# Patient Record
Sex: FEMALE | Race: WHITE | NOT HISPANIC OR LATINO | Employment: UNEMPLOYED | URBAN - METROPOLITAN AREA
[De-identification: names, ages, dates, MRNs, and addresses within clinical notes are randomized per-mention and may not be internally consistent; named-entity substitution may affect disease eponyms.]

---

## 2024-01-20 ENCOUNTER — HOSPITAL ENCOUNTER (EMERGENCY)
Facility: HOSPITAL | Age: 34
Discharge: HOME | End: 2024-01-20
Attending: EMERGENCY MEDICINE
Payer: COMMERCIAL

## 2024-01-20 ENCOUNTER — APPOINTMENT (EMERGENCY)
Dept: RADIOLOGY | Facility: HOSPITAL | Age: 34
End: 2024-01-20
Payer: COMMERCIAL

## 2024-01-20 VITALS
DIASTOLIC BLOOD PRESSURE: 87 MMHG | SYSTOLIC BLOOD PRESSURE: 114 MMHG | RESPIRATION RATE: 19 BRPM | HEART RATE: 87 BPM | OXYGEN SATURATION: 98 % | TEMPERATURE: 97.7 F

## 2024-01-20 DIAGNOSIS — J40 BRONCHITIS: Primary | ICD-10-CM

## 2024-01-20 LAB
FLUAV RNA RESP QL NAA+PROBE: NEGATIVE
FLUBV RNA RESP QL NAA+PROBE: NEGATIVE
RSV RNA RESP QL NAA+PROBE: NEGATIVE
SARS-COV-2 RNA RESP QL NAA+PROBE: NEGATIVE

## 2024-01-20 PROCEDURE — 71045 X-RAY EXAM CHEST 1 VIEW: CPT

## 2024-01-20 PROCEDURE — 99285 EMERGENCY DEPT VISIT HI MDM: CPT

## 2024-01-20 PROCEDURE — 87798 DETECT AGENT NOS DNA AMP: CPT | Performed by: EMERGENCY MEDICINE

## 2024-01-20 PROCEDURE — 0241U HB NFCT DS VIR RESP RNA 4 TRGT: CPT | Performed by: EMERGENCY MEDICINE

## 2024-01-20 PROCEDURE — 93005 ELECTROCARDIOGRAM TRACING: CPT

## 2024-01-20 PROCEDURE — 99284 EMERGENCY DEPT VISIT MOD MDM: CPT | Performed by: EMERGENCY MEDICINE

## 2024-01-20 RX ORDER — BENZONATATE 100 MG/1
100 CAPSULE ORAL 2 TIMES DAILY PRN
Qty: 21 CAPSULE | Refills: 0 | Status: SHIPPED | OUTPATIENT
Start: 2024-01-20

## 2024-01-20 RX ORDER — AZITHROMYCIN 250 MG/1
TABLET, FILM COATED ORAL
Qty: 6 TABLET | Refills: 0 | Status: SHIPPED | OUTPATIENT
Start: 2024-01-20 | End: 2024-01-24

## 2024-01-20 RX ORDER — AZITHROMYCIN 250 MG/1
500 TABLET, FILM COATED ORAL ONCE
Status: COMPLETED | OUTPATIENT
Start: 2024-01-20 | End: 2024-01-20

## 2024-01-20 RX ADMIN — AZITHROMYCIN DIHYDRATE 500 MG: 250 TABLET ORAL at 22:04

## 2024-01-21 LAB
ATRIAL RATE: 77 BPM
B PARAPERT DNA UPPER RESP QL NAA+PROBE: NOT DETECTED
B PERT DNA UPPER RESP QL NAA+PROBE: NOT DETECTED
P AXIS: 57 DEGREES
PR INTERVAL: 130 MS
QRS AXIS: 58 DEGREES
QRSD INTERVAL: 84 MS
QT INTERVAL: 366 MS
QTC INTERVAL: 414 MS
T WAVE AXIS: 38 DEGREES
VENTRICULAR RATE: 77 BPM

## 2024-01-21 NOTE — ED PROVIDER NOTES
History  Chief Complaint   Patient presents with    Cough     Started mid December after pulling tile off the floor with dust flying; was given antibiotics and started to feel better but then yesterday started with increasing cough feeling it mostly in the chest     Shortness of Breath     All the time; increasing after activity and coughing attacks     HPI      This is a very pleasant, nontoxic-appearing, 33-year-old female with a history of anxiety, depression, lupus, presents emergency department with greater than 1 month history of cough, congestion.  Patient was not tested for any viral illnesses did see her primary care doctor in which the chest x-ray was unremarkable and she was placed on a 7-day course of doxycycline which she finished 2 weeks ago.  Patient is currently in the process of moving from [place] to the [facility] up in [facility] and approximately 1 month ago she worked with her  to rip out the carpeting that underneath had a concrete floor there was a lot of dust and after she swept that up she started to have some shortness of breath and cough.  Did notice that there was mold on the carpeting became concerned as since her symptoms affect going on for extended period time she came to emergency department further evaluation.  None       Past Medical History:   Diagnosis Date    Anxiety     Depression     Hypertension     Insomnia     Lupus (HCC)     Migraines        History reviewed. No pertinent surgical history.    History reviewed. No pertinent family history.  I have reviewed and agree with the history as documented.    E-Cigarette/Vaping    E-Cigarette Use Never User      E-Cigarette/Vaping Substances    Nicotine No     THC No     CBD No     Flavoring No     Other No     Unknown No      Social History     Tobacco Use    Smoking status: Never    Smokeless tobacco: Never   Vaping Use    Vaping status: Never Used   Substance Use Topics    Alcohol use: Never    Drug use: Yes     Types:  Marijuana       Review of Systems   Constitutional: Negative.  Negative for chills and fever.   HENT: Negative.  Negative for ear pain.    Eyes: Negative.    Respiratory:  Positive for cough and chest tightness. Negative for shortness of breath and wheezing.    Cardiovascular: Negative.  Negative for chest pain, palpitations and leg swelling.   Gastrointestinal: Negative.  Negative for abdominal pain.   Endocrine: Negative.    Genitourinary: Negative.    Musculoskeletal: Negative.    Skin: Negative.    Allergic/Immunologic: Negative.    Neurological: Negative.    Hematological: Negative.    Psychiatric/Behavioral: Negative.         Physical Exam  Physical Exam  Vitals and nursing note reviewed.   Constitutional:       General: She is not in acute distress.     Appearance: She is well-developed and normal weight. She is not ill-appearing, toxic-appearing or diaphoretic.   HENT:      Head: Normocephalic and atraumatic.      Mouth/Throat:      Mouth: Mucous membranes are moist.      Pharynx: Oropharynx is clear.   Eyes:      Extraocular Movements: Extraocular movements intact.      Pupils: Pupils are equal, round, and reactive to light.   Cardiovascular:      Rate and Rhythm: Normal rate and regular rhythm.   Pulmonary:      Effort: Pulmonary effort is normal.      Breath sounds: Normal breath sounds. No decreased breath sounds, wheezing, rhonchi or rales.   Musculoskeletal:         General: Normal range of motion.      Cervical back: Normal range of motion and neck supple.      Right lower leg: No tenderness. No edema.      Left lower leg: No tenderness. No edema.   Skin:     General: Skin is warm.      Capillary Refill: Capillary refill takes less than 2 seconds.   Neurological:      General: No focal deficit present.      Mental Status: She is alert and oriented to person, place, and time.   Psychiatric:         Mood and Affect: Mood normal.         Behavior: Behavior normal.         Vital Signs  ED Triage Vitals  [01/20/24 2021]   Temperature Pulse Respirations Blood Pressure SpO2   97.7 °F (36.5 °C) 87 19 114/87 98 %      Temp src Heart Rate Source Patient Position - Orthostatic VS BP Location FiO2 (%)   -- Monitor Sitting Left arm --      Pain Score       --           Vitals:    01/20/24 2021   BP: 114/87   Pulse: 87   Patient Position - Orthostatic VS: Sitting         Visual Acuity      ED Medications  Medications   azithromycin (ZITHROMAX) tablet 500 mg (500 mg Oral Given 1/20/24 2204)       Diagnostic Studies  Results Reviewed       Procedure Component Value Units Date/Time    FLU/RSV/COVID - if FLU/RSV clinically relevant [[id number]]  (Normal) Collected: 01/20/24 2056    Lab Status: Final result Specimen: Nares from Nose Updated: 01/20/24 2139     SARS-CoV-2 Negative     INFLUENZA A PCR Negative     INFLUENZA B PCR Negative     RSV PCR Negative    Narrative:      FOR PEDIATRIC PATIENTS - copy/paste COVID Guidelines URL to browser: [email or web address]    SARS-CoV-2 assay is a Nucleic Acid Amplification assay intended for the  qualitative detection of nucleic acid from SARS-CoV-2 in nasopharyngeal  swabs. Results are for the presumptive identification of SARS-CoV-2 RNA.    Positive results are indicative of infection with SARS-CoV-2, the virus  causing COVID-19, but do not rule out bacterial infection or co-infection  with other viruses. Laboratories within the [place] and its  territories are required to report all positive results to the appropriate  public health authorities. Negative results do not preclude SARS-CoV-2  infection and should not be used as the sole basis for treatment or other  patient management decisions. Negative results must be combined with  clinical observations, patient history, and epidemiological information.  This test has not been FDA cleared or approved.    This test has been authorized by FDA under an Emergency Use  Authorization  (EUA). This test is only authorized for the duration of time the  declaration that circumstances exist justifying the authorization of the  emergency use of an in vitro diagnostic tests for detection of SARS-CoV-2  virus and/or diagnosis of COVID-19 infection under section 564(b)(1) of  the Act, 21 U.S.C. 360bbb-3(b)(1), unless the authorization is terminated  or revoked sooner. The test has been validated but independent review by FDA  and CLIA is pending.    Test performed using [employer]: This RT-PCR assay targets N2,  a region unique to SARS-CoV-2. A conserved region in the E-gene was chosen  for pan-Sarbecovirus detection which includes SARS-CoV-2.    According to CMS-2020-01-R, this platform meets the definition of high-throughput technology.    Bordetella pertussis / parapertussis PCR [[id number]] Collected: 01/20/24 2056    Lab Status: In process Specimen: Nasopharyngeal from Nose Updated: 01/20/24 2100    Narrative:      The following orders were created for panel order Bordetella pertussis / parapertussis PCR.  Procedure                               Abnormality         Status                     ---------                               -----------         ------                     Bordetella pertussis / p...[[id number]]                      In process                   Please view results for these tests on the individual orders.    Bordetella pertussis / parapertussis PCR [[id number]] Collected: 01/20/24 2056    Lab Status: In process Specimen: Nasopharyngeal from Nose Updated: 01/20/24 2100                   XR chest 1 view portable   ED Interpretation by Dontae Genao III, DO (01/20 2104)   Portable chest x-ray was no acute fractures, osseous abnormalities or pneumothoraces or infiltrates.                 Procedures  ECG 12 Lead Documentation Only    Date/Time: 1/20/2024 10:24 PM    Performed by: Dontae Genao III, DO  Authorized by: Dontae Genao III, DO     Indications / Diagnosis:  Chest pain after coughing  ECG reviewed by me, the ED Provider: yes    Patient location:  ED  Comments:      I personally reviewed this EKG that was performed the patient January 20, 2024, EKG was completed at 8:46 PM and interpreted by me at 8:50 PM, normal sinus rhythm with no acute ST abnormalities, patient's ventricular rate is 77 beats minute remaining portion of all is within normal limits.    No diffuse elevations to indicate pericarditis.  No coved ST elevations greater than 2mm with negative T waves in V1-3 to indicate concern for brugada.  No biphasic T waves in V2, V3 to indicate Wellens (critical stenosis of LAD).   No elevation in aVR or deviation when compared to V1 (can be associated with ST depression in I,II, V4-6 when left main occlusion is present).            ED Course  ED Course as of 01/20/24 2227   Sat Jan 20, 2024 2026 Patient seen and evaluated, orders placed,  > 1 month hx of cough, congestion, treated 2 weeks ago w/ 7 day course of doxcyclcine, outpatient x-ray negative, travels between [place] and up in [facility] (near area of recent pertussis outbreak).  Deep cough w/o fever.    Focused differential diagnosis in this patient is as follows: Pneumonia versus COVID versus flu versus RSV versus bronchitis versus pertussis.   2206 She became concerned about antibiotics because she has a remote history of C. difficile diarrhea secondary to clindamycin use last year.  Recently finished up doxycycline did not have any problems with this and she takes a probiotic every day along with yogurt.  Patient at high risk for pertussis and she was spending time in the area where there was a outbreak recently, has had a cough greater than 3 weeks amenable to appropriate antibiotics before, chest x-ray did not show any pneumonia, PCR testing sent, patient to be empirically treated with Zithromax for presumptive bronchitis.  Patient was concerned about the fact that she been  "staying up most of the night coughing, she will be given a prescription for Tessalon Perles, patient's demographics were queried in the drug monitoring program through the state, no active red flag issues.                                             Medical Decision Making  Greater than 1 month history of a chronic cough, no history of intrinsic lung disease, was exposed to mold ripping up carpets at a new house that they are refurbishing locally up in [facility] near [place] which is currently a outbreak of pertussis, pertussis PCR sent, remaining portion of viral panel is negative, chest x-ray unremarkable.  Will empirically treat the patient with Zithromax.  Patient has a prior history of C. difficile with diarrhea last year he recovered, was secondary to taking clindamycin at that time.  Patient noted that she was requesting IV antibiotics because she \"under the impression that IV antibiotics lessens your for this\", explained that all antibiotics placed patient is at risk for C. difficile diarrhea, continue outpatient probiotics, patient does consume yogurt daily, demineralized patient's symptoms.  Patient stable for discharge.  QT interval  nrl on EKG.    Portions of the record may have been created with voice recognition software. Occasional wrong word or \"sound a like\" substitutions may have occurred due to the inherent limitations of voice recognition software. Read the chart carefully and recognize, using context, where substitutions have occurred.          Amount and/or Complexity of Data Reviewed  Labs: ordered.  Radiology: ordered and independent interpretation performed.    Risk  Prescription drug management.             Disposition  Final diagnoses:   Bronchitis     Time reflects when diagnosis was documented in both MDM as applicable and the Disposition within this note       Time User Action Codes Description Comment    1/20/2024  9:53 PM Dontae Genao Add [J40] Bronchitis       "     ED Disposition       ED Disposition   Discharge    Condition   Stable    Date/Time   Sat Jan 20, 2024  9:52 PM    Comment   Jia Armstrong discharge to home/self care.                   Follow-up Information    None         Discharge Medication List as of 1/20/2024 10:06 PM        START taking these medications    Details   azithromycin (Zithromax Z-Fidel) 250 mg tablet Take 2 tablets today then 1 tablet daily x 4 days, Normal      benzonatate (TESSALON PERLES) 100 mg capsule Take 1 capsule (100 mg total) by mouth 2 (two) times a day as needed for cough, Starting Sat 1/20/2024, Normal             No discharge procedures on file.    PDMP Review         Value Time User    PDMP Reviewed  Yes 1/20/2024 10:08 PM Dontae Genao III, DO            ED Provider  Electronically Signed by             Dontae Genao III, DO  01/20/24 9896

## 2024-01-24 ENCOUNTER — APPOINTMENT (OUTPATIENT)
Dept: RADIOLOGY | Facility: CLINIC | Age: 34
End: 2024-01-24
Payer: COMMERCIAL

## 2024-01-24 DIAGNOSIS — R05.9 COUGH, UNSPECIFIED TYPE: ICD-10-CM

## 2024-01-24 PROCEDURE — 71046 X-RAY EXAM CHEST 2 VIEWS: CPT

## 2024-01-25 ENCOUNTER — APPOINTMENT (EMERGENCY)
Dept: CT IMAGING | Facility: HOSPITAL | Age: 34
End: 2024-01-25
Payer: COMMERCIAL

## 2024-01-25 ENCOUNTER — HOSPITAL ENCOUNTER (EMERGENCY)
Facility: HOSPITAL | Age: 34
Discharge: HOME | End: 2024-01-25
Attending: EMERGENCY MEDICINE
Payer: COMMERCIAL

## 2024-01-25 VITALS
TEMPERATURE: 97.6 F | DIASTOLIC BLOOD PRESSURE: 74 MMHG | RESPIRATION RATE: 20 BRPM | HEART RATE: 96 BPM | SYSTOLIC BLOOD PRESSURE: 138 MMHG | OXYGEN SATURATION: 90 %

## 2024-01-25 DIAGNOSIS — R29.0 CARPOPEDAL SPASM: ICD-10-CM

## 2024-01-25 DIAGNOSIS — J20.9 ACUTE BRONCHITIS: Primary | ICD-10-CM

## 2024-01-25 LAB
ALBUMIN SERPL BCP-MCNC: 4.2 G/DL (ref 3.5–5)
ALP SERPL-CCNC: 60 U/L (ref 34–104)
ALT SERPL W P-5'-P-CCNC: 9 U/L (ref 7–52)
ANION GAP SERPL CALCULATED.3IONS-SCNC: 9 MMOL/L
AST SERPL W P-5'-P-CCNC: 14 U/L (ref 13–39)
BASOPHILS # BLD AUTO: 0.07 THOUSANDS/ÂΜL (ref 0–0.1)
BASOPHILS NFR BLD AUTO: 1 % (ref 0–1)
BILIRUB SERPL-MCNC: 0.48 MG/DL (ref 0.2–1)
BUN SERPL-MCNC: 8 MG/DL (ref 5–25)
CALCIUM SERPL-MCNC: 9.7 MG/DL (ref 8.4–10.2)
CARDIAC TROPONIN I PNL SERPL HS: <2 NG/L
CHLORIDE SERPL-SCNC: 108 MMOL/L (ref 96–108)
CO2 SERPL-SCNC: 23 MMOL/L (ref 21–32)
CREAT SERPL-MCNC: 0.89 MG/DL (ref 0.6–1.3)
EOSINOPHIL # BLD AUTO: 0.23 THOUSAND/ÂΜL (ref 0–0.61)
EOSINOPHIL NFR BLD AUTO: 2 % (ref 0–6)
ERYTHROCYTE [DISTWIDTH] IN BLOOD BY AUTOMATED COUNT: 12.1 % (ref 11.6–15.1)
GFR SERPL CREATININE-BSD FRML MDRD: 84 ML/MIN/1.73SQ M
GLUCOSE SERPL-MCNC: 112 MG/DL (ref 65–140)
HCT VFR BLD AUTO: 40.7 % (ref 34.8–46.1)
HGB BLD-MCNC: 14.3 G/DL (ref 11.5–15.4)
IMM GRANULOCYTES # BLD AUTO: 0.03 THOUSAND/UL (ref 0–0.2)
IMM GRANULOCYTES NFR BLD AUTO: 0 % (ref 0–2)
LYMPHOCYTES # BLD AUTO: 2.08 THOUSANDS/ÂΜL (ref 0.6–4.47)
LYMPHOCYTES NFR BLD AUTO: 17 % (ref 14–44)
MCH RBC QN AUTO: 30.6 PG (ref 26.8–34.3)
MCHC RBC AUTO-ENTMCNC: 35.1 G/DL (ref 31.4–37.4)
MCV RBC AUTO: 87 FL (ref 82–98)
MONOCYTES # BLD AUTO: 0.7 THOUSAND/ÂΜL (ref 0.17–1.22)
MONOCYTES NFR BLD AUTO: 6 % (ref 4–12)
NEUTROPHILS # BLD AUTO: 8.94 THOUSANDS/ÂΜL (ref 1.85–7.62)
NEUTS SEG NFR BLD AUTO: 74 % (ref 43–75)
NRBC BLD AUTO-RTO: 0 /100 WBCS
PLATELET # BLD AUTO: 297 THOUSANDS/UL (ref 149–390)
PMV BLD AUTO: 9.7 FL (ref 8.9–12.7)
POTASSIUM SERPL-SCNC: 3.9 MMOL/L (ref 3.5–5.3)
PROT SERPL-MCNC: 6.8 G/DL (ref 6.4–8.4)
RBC # BLD AUTO: 4.68 MILLION/UL (ref 3.81–5.12)
SODIUM SERPL-SCNC: 140 MMOL/L (ref 135–147)
WBC # BLD AUTO: 12.05 THOUSAND/UL (ref 4.31–10.16)

## 2024-01-25 PROCEDURE — 94640 AIRWAY INHALATION TREATMENT: CPT

## 2024-01-25 PROCEDURE — 36415 COLL VENOUS BLD VENIPUNCTURE: CPT | Performed by: PHYSICIAN ASSISTANT

## 2024-01-25 PROCEDURE — 99284 EMERGENCY DEPT VISIT MOD MDM: CPT

## 2024-01-25 PROCEDURE — 99284 EMERGENCY DEPT VISIT MOD MDM: CPT | Performed by: PHYSICIAN ASSISTANT

## 2024-01-25 PROCEDURE — 93005 ELECTROCARDIOGRAM TRACING: CPT

## 2024-01-25 PROCEDURE — 96360 HYDRATION IV INFUSION INIT: CPT

## 2024-01-25 PROCEDURE — 85025 COMPLETE CBC W/AUTO DIFF WBC: CPT | Performed by: PHYSICIAN ASSISTANT

## 2024-01-25 PROCEDURE — 84484 ASSAY OF TROPONIN QUANT: CPT | Performed by: PHYSICIAN ASSISTANT

## 2024-01-25 PROCEDURE — 71250 CT THORAX DX C-: CPT

## 2024-01-25 PROCEDURE — 80053 COMPREHEN METABOLIC PANEL: CPT | Performed by: PHYSICIAN ASSISTANT

## 2024-01-25 RX ORDER — LORAZEPAM 1 MG/1
1 TABLET ORAL ONCE
Status: COMPLETED | OUTPATIENT
Start: 2024-01-25 | End: 2024-01-25

## 2024-01-25 RX ORDER — PREDNISONE 20 MG/1
60 TABLET ORAL ONCE
Status: COMPLETED | OUTPATIENT
Start: 2024-01-25 | End: 2024-01-25

## 2024-01-25 RX ORDER — ALBUTEROL SULFATE 90 UG/1
2 AEROSOL, METERED RESPIRATORY (INHALATION) EVERY 6 HOURS PRN
Qty: 8.5 G | Refills: 0 | Status: SHIPPED | OUTPATIENT
Start: 2024-01-25

## 2024-01-25 RX ORDER — BENZONATATE 100 MG/1
100 CAPSULE ORAL EVERY 8 HOURS
Qty: 9 CAPSULE | Refills: 0 | Status: SHIPPED | OUTPATIENT
Start: 2024-01-25

## 2024-01-25 RX ORDER — PREDNISONE 20 MG/1
40 TABLET ORAL DAILY
Qty: 8 TABLET | Refills: 0 | Status: SHIPPED | OUTPATIENT
Start: 2024-01-25 | End: 2024-01-29

## 2024-01-25 RX ORDER — ALBUTEROL SULFATE 2.5 MG/3ML
5 SOLUTION RESPIRATORY (INHALATION) ONCE
Status: COMPLETED | OUTPATIENT
Start: 2024-01-25 | End: 2024-01-25

## 2024-01-25 RX ORDER — BENZONATATE 100 MG/1
200 CAPSULE ORAL ONCE
Status: COMPLETED | OUTPATIENT
Start: 2024-01-25 | End: 2024-01-25

## 2024-01-25 RX ADMIN — LORAZEPAM 1 MG: 1 TABLET ORAL at 20:46

## 2024-01-25 RX ADMIN — PREDNISONE 60 MG: 20 TABLET ORAL at 20:05

## 2024-01-25 RX ADMIN — SODIUM CHLORIDE 1000 ML: 0.9 INJECTION, SOLUTION INTRAVENOUS at 21:51

## 2024-01-25 RX ADMIN — ALBUTEROL SULFATE 5 MG: 2.5 SOLUTION RESPIRATORY (INHALATION) at 20:05

## 2024-01-25 RX ADMIN — BENZONATATE 200 MG: 100 CAPSULE ORAL at 22:29

## 2024-01-25 RX ADMIN — IPRATROPIUM BROMIDE 0.5 MG: 0.5 SOLUTION RESPIRATORY (INHALATION) at 20:05

## 2024-01-26 LAB
ATRIAL RATE: 77 BPM
P AXIS: -20 DEGREES
PR INTERVAL: 116 MS
QRS AXIS: 28 DEGREES
QRSD INTERVAL: 82 MS
QT INTERVAL: 410 MS
QTC INTERVAL: 463 MS
T WAVE AXIS: 5 DEGREES
VENTRICULAR RATE: 77 BPM

## 2024-01-26 NOTE — ED PROVIDER NOTES
History  Chief Complaint   Patient presents with    Cough     Pt reports cough x 1.5 months. Pt states started on abx, finished course today. Pt states symptoms are worsening. Denies fevers at home.      32 yo with cough for 6 weeks. Seen 5 days ago and diagnosed with bronchitis but also placed on zpack for concern of pertussis given pt described the cough as whooping. She denies hemoptysis. No fever. No sick contacts.       History provided by:  Patient   used: No    Cough  Associated symptoms: shortness of breath and wheezing    Associated symptoms: no chest pain, no chills, no ear pain, no fever, no rash and no sore throat        Prior to Admission Medications   Prescriptions Last Dose Informant Patient Reported? Taking?   azithromycin (Zithromax Z-Fidel) 250 mg tablet   No No   Sig: Take 2 tablets today then 1 tablet daily x 4 days   benzonatate (TESSALON PERLES) 100 mg capsule   No No   Sig: Take 1 capsule (100 mg total) by mouth 2 (two) times a day as needed for cough      Facility-Administered Medications: None       Past Medical History:   Diagnosis Date    Anxiety     Depression     Hypertension     Insomnia     Lupus (HCC)     Migraines        History reviewed. No pertinent surgical history.    History reviewed. No pertinent family history.  I have reviewed and agree with the history as documented.    E-Cigarette/Vaping    E-Cigarette Use Never User      E-Cigarette/Vaping Substances    Nicotine No     THC No     CBD No     Flavoring No     Other No     Unknown No      Social History     Tobacco Use    Smoking status: Never    Smokeless tobacco: Never   Vaping Use    Vaping status: Never Used   Substance Use Topics    Alcohol use: Never    Drug use: Yes     Types: Marijuana       Review of Systems   Constitutional:  Negative for chills and fever.   HENT:  Negative for ear pain and sore throat.    Eyes:  Negative for pain and visual disturbance.   Respiratory:  Positive for cough,  shortness of breath and wheezing.    Cardiovascular:  Negative for chest pain and palpitations.   Gastrointestinal:  Negative for abdominal pain and vomiting.   Genitourinary:  Negative for dysuria and hematuria.   Musculoskeletal:  Negative for arthralgias and back pain.   Skin:  Negative for color change and rash.   Neurological:  Negative for seizures and syncope.   All other systems reviewed and are negative.      Physical Exam  Physical Exam  Vitals and nursing note reviewed.   Constitutional:       General: She is not in acute distress.     Appearance: She is well-developed.   HENT:      Head: Normocephalic and atraumatic.   Eyes:      Conjunctiva/sclera: Conjunctivae normal.   Cardiovascular:      Rate and Rhythm: Normal rate and regular rhythm.      Heart sounds: No murmur heard.  Pulmonary:      Effort: Pulmonary effort is normal. No respiratory distress.      Breath sounds: Wheezing present.   Abdominal:      Palpations: Abdomen is soft.      Tenderness: There is no abdominal tenderness.   Musculoskeletal:         General: No swelling.      Cervical back: Neck supple.   Skin:     General: Skin is warm and dry.      Capillary Refill: Capillary refill takes less than 2 seconds.   Neurological:      Mental Status: She is alert.   Psychiatric:         Mood and Affect: Mood normal.         Vital Signs  ED Triage Vitals [01/25/24 1947]   Temperature Pulse Respirations Blood Pressure SpO2   97.6 °F (36.4 °C) 96 20 138/74 90 %      Temp Source Heart Rate Source Patient Position - Orthostatic VS BP Location FiO2 (%)   Temporal Monitor Sitting Left arm --      Pain Score       --           Vitals:    01/25/24 1947   BP: 138/74   Pulse: 96   Patient Position - Orthostatic VS: Sitting         Visual Acuity  Visual Acuity      Flowsheet Row Most Recent Value   L Pupil Size (mm) 3   R Pupil Size (mm) 3            ED Medications  Medications   albuterol inhalation solution 5 mg (5 mg Nebulization Given 1/25/24 2005)    ipratropium (ATROVENT) 0.02 % inhalation solution 0.5 mg (0.5 mg Nebulization Given 1/25/24 2005)   predniSONE tablet 60 mg (60 mg Oral Given 1/25/24 2005)   LORazepam (ATIVAN) tablet 1 mg (1 mg Oral Given 1/25/24 2046)   sodium chloride 0.9 % bolus 1,000 mL (0 mL Intravenous Stopped 1/25/24 2244)   benzonatate (TESSALON PERLES) capsule 200 mg (200 mg Oral Given 1/25/24 2229)       Diagnostic Studies  Results Reviewed       Procedure Component Value Units Date/Time    HS Troponin 0hr (reflex protocol) [[id number]]  (Normal) Collected: 01/25/24 2207    Lab Status: Final result Specimen: Blood from Arm, Left Updated: 01/25/24 2239     hs TnI 0hr <2 ng/L     Comprehensive metabolic panel [[id number]] Collected: 01/25/24 2150    Lab Status: Final result Specimen: Blood from Arm, Left Updated: 01/25/24 2211     Sodium 140 mmol/L      Potassium 3.9 mmol/L      Chloride 108 mmol/L      CO2 23 mmol/L      ANION GAP 9 mmol/L      BUN 8 mg/dL      Creatinine 0.89 mg/dL      Glucose 112 mg/dL      Calcium 9.7 mg/dL      AST 14 U/L      ALT 9 U/L      Alkaline Phosphatase 60 U/L      Total Protein 6.8 g/dL      Albumin 4.2 g/dL      Total Bilirubin 0.48 mg/dL      eGFR 84 ml/min/1.73sq m     Narrative:      National Kidney Disease Foundation guidelines for Chronic Kidney Disease (CKD):     Stage 1 with normal or high GFR (GFR > 90 mL/min/1.73 square meters)    Stage 2 Mild CKD (GFR = 60-89 mL/min/1.73 square meters)    Stage 3A Moderate CKD (GFR = 45-59 mL/min/1.73 square meters)    Stage 3B Moderate CKD (GFR = 30-44 mL/min/1.73 square meters)    Stage 4 Severe CKD (GFR = 15-29 mL/min/1.73 square meters)    Stage 5 End Stage CKD (GFR <15 mL/min/1.73 square meters)  Note: GFR calculation is accurate only with a steady state creatinine    CBC and differential [[id number]]  (Abnormal) Collected: 01/25/24 2150    Lab Status: Final result Specimen: Blood from Arm, Left Updated: 01/25/24 8473     WBC 12.05 Thousand/uL      RBC 4.68  Million/uL      Hemoglobin 14.3 g/dL      Hematocrit 40.7 %      MCV 87 fL      MCH 30.6 pg      MCHC 35.1 g/dL      RDW 12.1 %      MPV 9.7 fL      Platelets 297 Thousands/uL      nRBC 0 /100 WBCs      Neutrophils Relative 74 %      Immat GRANS % 0 %      Lymphocytes Relative 17 %      Monocytes Relative 6 %      Eosinophils Relative 2 %      Basophils Relative 1 %      Neutrophils Absolute 8.94 Thousands/µL      Immature Grans Absolute 0.03 Thousand/uL      Lymphocytes Absolute 2.08 Thousands/µL      Monocytes Absolute 0.70 Thousand/µL      Eosinophils Absolute 0.23 Thousand/µL      Basophils Absolute 0.07 Thousands/µL                    CT chest without contrast   Final Result by Bret Pretty MD (01/25 2038)      No acute intrathoracic abnormality. No infiltrate or pleural effusion.               Workstation performed: [id number]                    Procedures  Procedures         ED Course  ED Course as of 01/28/24 1443   u Jan 25, 2024 2104  brought me to bedside because pt complaining of pain in hand. In carpopedal spasm upon my eval. She began having anxiety/panic attack. Given ativan.              HEART Risk Score      Flowsheet Row Most Recent Value   Heart Score Risk Calculator    History 0 Filed at: 01/28/2024 1440   ECG 0 Filed at: 01/28/2024 1440   Age 0 Filed at: 01/28/2024 1440   Risk Factors 0 Filed at: 01/28/2024 1440   Troponin 0 Filed at: 01/28/2024 1440   HEART Score 0 Filed at: 01/28/2024 1440                                        Medical Decision Making  DDx including but not limited to:  URI, bronchitis, pneumonia, GERD, aspiration pneumonitis, viral illness, COVID 19. Plan: pt demanding CT. States she previously had pneumonia that was not diagnosed until CT. I explained that Cough for 6 weeks without fever is less likely to be pneumonia and more suspicious for other etiology including bronchitis. Pt continues to demand a CT be done. States she knows her body.     MDM: 34 yo  with cough. Became lightheaded after neb. Cardiac workup was unremarkable. CT negative for evidence of pneumonia. Could be bronchitis. Notes she has been having these symptoms since they moved into a new house. Possibly environmental exposure. Discussed steroid. Albuterol inhaler prn. F/u PCP and ultimately pulm if cough and wheezing not improving. Return parameters provided. Pt understands and agrees with plan.      Amount and/or Complexity of Data Reviewed  Labs: ordered.  Radiology: ordered.    Risk  Prescription drug management.             Disposition  Final diagnoses:   Acute bronchitis   Carpopedal spasm     Time reflects when diagnosis was documented in both MDM as applicable and the Disposition within this note       Time User Action Codes Description Comment    1/25/2024  9:02 PM Cal Gonzalez Add [J20.9] Acute bronchitis     1/25/2024  9:02 PM Cal Gonzalez Add [R29.0] Carpopedal spasm           ED Disposition       ED Disposition   Discharge    Condition   Stable    Date/Time   u Jan 25, 2024 4101    Comment   Jia Armstrong discharge to home/self care.                   Follow-up Information       Follow up With Specialties Details Why Contact Info Additional Information    Critical access hospital Emergency Department Emergency Medicine Go to  If symptoms worsen [address] [facility] [id number] [phone] Critical access hospital Emergency Department, [address] [place], [place], [address]            Discharge Medication List as of 1/25/2024 10:41 PM        START taking these medications    Details   albuterol (ProAir HFA) 90 mcg/act inhaler Inhale 2 puffs every 6 (six) hours as needed for wheezing, Starting u 1/25/2024, Print      predniSONE 20 mg tablet Take 2 tablets (40 mg total) by mouth daily for 4 days, Starting u 1/25/2024, Until Mon 1/29/2024, Print           CONTINUE these medications which have NOT CHANGED    Details   benzonatate  (TESSALON PERLES) 100 mg capsule Take 1 capsule (100 mg total) by mouth 2 (two) times a day as needed for cough, Starting Sat 1/20/2024, Normal           STOP taking these medications       azithromycin (Zithromax Z-Fidel) 250 mg tablet Comments:   Reason for Stopping:                   PDMP Review         Value Time User    PDMP Reviewed  Yes 1/20/2024 10:08 PM Dontae Genao III, DO            ED Provider  Electronically Signed by             Cal Gonzalez PA-C  01/28/24 6778

## 2024-02-01 ENCOUNTER — TELEPHONE (OUTPATIENT)
Age: 34
End: 2024-02-01

## 2024-06-20 ENCOUNTER — TELEPHONE (OUTPATIENT)
Dept: PSYCHIATRY | Facility: CLINIC | Age: 34
End: 2024-06-20

## 2024-06-20 NOTE — TELEPHONE ENCOUNTER
Patient has been added to the Medication Management wait list without a referral.    Insurance: juana  Insurance Type:    Commercial [x]   Medicaid []   [facility] (if applicable)   Medicare []  Location Preference: [place]  Provider Preference: no prov pref  Virtual: Yes [] No [x]  Were outside resources sent: Yes [] No [x]

## 2024-07-17 ENCOUNTER — TELEPHONE (OUTPATIENT)
Age: 34
End: 2024-07-17

## 2024-07-17 NOTE — TELEPHONE ENCOUNTER
Patient called to make appointment she has cough and congestion.  Spouse had it last week.  She has lupus and tends to get things a bit worse.  She would like to get in as soon as possible.  Schedule reviewed and appointment made for New Patient with FAYE Riggs next week.    Please review if anything open this week.  Patient is sick and would really like to see the doctor that she gets established with here.

## 2024-07-19 ENCOUNTER — OFFICE VISIT (OUTPATIENT)
Dept: GYNECOLOGY | Facility: CLINIC | Age: 34
End: 2024-07-19
Payer: COMMERCIAL

## 2024-07-19 VITALS
BODY MASS INDEX: 37.12 KG/M2 | HEIGHT: 66 IN | WEIGHT: 231 LBS | DIASTOLIC BLOOD PRESSURE: 70 MMHG | SYSTOLIC BLOOD PRESSURE: 110 MMHG

## 2024-07-19 DIAGNOSIS — Z01.419 ENCOUNTER FOR WELL WOMAN EXAM: Primary | ICD-10-CM

## 2024-07-19 DIAGNOSIS — Z30.011 INITIATION OF OCP (BCP): ICD-10-CM

## 2024-07-19 DIAGNOSIS — Z12.4 CERVICAL CANCER SCREENING: ICD-10-CM

## 2024-07-19 DIAGNOSIS — Z12.39 ENCOUNTER FOR SCREENING BREAST EXAMINATION: ICD-10-CM

## 2024-07-19 PROCEDURE — 99385 PREV VISIT NEW AGE 18-39: CPT | Performed by: PHYSICIAN ASSISTANT

## 2024-07-23 NOTE — PROGRESS NOTES
Assessment/Plan:      Diagnoses and all orders for this visit:    Encounter for well woman exam    Encounter for screening breast examination    Cervical cancer screening    Initiation of OCP (BCP)  -     Norethin-Eth Estrad-Fe Biphas 1 MG-10 MCG / 10 MCG TABS; Take 1 tablet by mouth in the morning    Other orders  -     Discontinue: Norethin-Eth Estrad-Fe Biphas 1 MG-10 MCG / 10 MCG TABS; Take 1 tablet by mouth (Patient not taking: Reported on 7/19/2024)          Subjective:     Patient ID: Jia Armstrong is a 34 y.o. female.    Pt presents asa new patient for her annual exam today--  She has no complaints except cycles heavy, painful, irregular since stopping OCPs a few months ago  Would like to restart  Bowel and bladder are regular  No breast concerns today    No pap today.    Rx lolo  Daily mvi  Nsaids prn        Review of Systems   Constitutional:  Negative for chills, fever and unexpected weight change.   HENT:  Negative for ear pain and sore throat.    Eyes:  Negative for pain and visual disturbance.   Respiratory:  Negative for cough and shortness of breath.    Cardiovascular:  Negative for chest pain and palpitations.   Gastrointestinal:  Negative for abdominal pain, blood in stool, constipation, diarrhea and vomiting.   Genitourinary: Negative.  Negative for dysuria and hematuria.   Musculoskeletal:  Negative for arthralgias and back pain.   Skin:  Negative for color change and rash.   Neurological:  Negative for seizures and syncope.   All other systems reviewed and are negative.        Objective:     Physical Exam  Vitals and nursing note reviewed.   Constitutional:       Appearance: Normal appearance. She is well-developed.   HENT:      Head: Normocephalic and atraumatic.   Chest:   Breasts:     Right: No inverted nipple, mass, nipple discharge or skin change.      Left: No inverted nipple, mass, nipple discharge or skin change.   Abdominal:      Palpations: Abdomen is soft.   Genitourinary:     General:  Normal vulva.      Exam position: Supine.      Labia:         Right: No rash, tenderness or lesion.         Left: No rash, tenderness or lesion.       Vagina: Normal.      Cervix: No cervical motion tenderness, discharge or friability.      Adnexa:         Right: No mass, tenderness or fullness.          Left: No mass, tenderness or fullness.     Musculoskeletal:      Cervical back: Normal range of motion.   Lymphadenopathy:      Lower Body: No right inguinal adenopathy. No left inguinal adenopathy.   Neurological:      Mental Status: She is alert.

## 2024-07-24 ENCOUNTER — OFFICE VISIT (OUTPATIENT)
Dept: FAMILY MEDICINE CLINIC | Facility: CLINIC | Age: 34
End: 2024-07-24
Payer: COMMERCIAL

## 2024-07-24 VITALS
OXYGEN SATURATION: 99 % | DIASTOLIC BLOOD PRESSURE: 74 MMHG | TEMPERATURE: 98.2 F | SYSTOLIC BLOOD PRESSURE: 108 MMHG | HEIGHT: 66 IN | BODY MASS INDEX: 36.64 KG/M2 | WEIGHT: 228 LBS | HEART RATE: 76 BPM

## 2024-07-24 DIAGNOSIS — E61.1 IRON DEFICIENCY: ICD-10-CM

## 2024-07-24 DIAGNOSIS — G47.00 INSOMNIA, UNSPECIFIED TYPE: ICD-10-CM

## 2024-07-24 DIAGNOSIS — E54 VITAMIN C DEFICIENCY: ICD-10-CM

## 2024-07-24 DIAGNOSIS — Z13.220 LIPID SCREENING: ICD-10-CM

## 2024-07-24 DIAGNOSIS — Z00.00 HEALTH MAINTENANCE EXAMINATION: ICD-10-CM

## 2024-07-24 DIAGNOSIS — T45.2X5A: ICD-10-CM

## 2024-07-24 DIAGNOSIS — L70.9 ACNE, UNSPECIFIED ACNE TYPE: ICD-10-CM

## 2024-07-24 DIAGNOSIS — M32.9 LUPUS (HCC): ICD-10-CM

## 2024-07-24 DIAGNOSIS — K21.9 GASTROESOPHAGEAL REFLUX DISEASE WITHOUT ESOPHAGITIS: ICD-10-CM

## 2024-07-24 DIAGNOSIS — I10 PRIMARY HYPERTENSION: ICD-10-CM

## 2024-07-24 DIAGNOSIS — E55.9 VITAMIN D DEFICIENCY: ICD-10-CM

## 2024-07-24 DIAGNOSIS — G43.809 OTHER MIGRAINE WITHOUT STATUS MIGRAINOSUS, NOT INTRACTABLE: Primary | ICD-10-CM

## 2024-07-24 DIAGNOSIS — F41.9 ANXIETY: ICD-10-CM

## 2024-07-24 DIAGNOSIS — F32.A DEPRESSION, UNSPECIFIED DEPRESSION TYPE: ICD-10-CM

## 2024-07-24 DIAGNOSIS — E53.8 VITAMIN B12 DEFICIENCY: ICD-10-CM

## 2024-07-24 DIAGNOSIS — Z13.29 SCREENING FOR THYROID DISORDER: ICD-10-CM

## 2024-07-24 PROCEDURE — 99395 PREV VISIT EST AGE 18-39: CPT

## 2024-07-24 PROCEDURE — 99204 OFFICE O/P NEW MOD 45 MIN: CPT

## 2024-07-24 RX ORDER — IBUPROFEN 400 MG/1
TABLET ORAL EVERY 6 HOURS PRN
COMMUNITY
End: 2024-07-24

## 2024-07-24 RX ORDER — BELIMUMAB 200 MG/ML
200 SOLUTION SUBCUTANEOUS
COMMUNITY
Start: 2024-07-18

## 2024-07-24 RX ORDER — CHOLECALCIFEROL (VITAMIN D3) 50 MCG
2000 TABLET ORAL DAILY
COMMUNITY

## 2024-07-24 RX ORDER — HYDROXYCHLOROQUINE SULFATE 200 MG/1
200 TABLET, FILM COATED ORAL 2 TIMES DAILY WITH MEALS
COMMUNITY

## 2024-07-24 RX ORDER — CLONAZEPAM 0.5 MG/1
0.5 TABLET ORAL 3 TIMES DAILY
COMMUNITY

## 2024-07-24 RX ORDER — LISINOPRIL 5 MG/1
5 TABLET ORAL DAILY
Qty: 30 TABLET | Refills: 0 | Status: SHIPPED | OUTPATIENT
Start: 2024-07-24 | End: 2024-07-24

## 2024-07-24 RX ORDER — LISINOPRIL 5 MG/1
5 TABLET ORAL DAILY
COMMUNITY
End: 2024-07-24 | Stop reason: SDUPTHER

## 2024-07-24 RX ORDER — SACCHAROMYCES BOULARDII 250 MG
250 CAPSULE ORAL 2 TIMES DAILY
COMMUNITY

## 2024-07-24 RX ORDER — ATOGEPANT 60 MG/1
60 TABLET ORAL DAILY
COMMUNITY
End: 2024-07-24 | Stop reason: SDUPTHER

## 2024-07-24 RX ORDER — GABAPENTIN 300 MG/1
300 CAPSULE ORAL 2 TIMES DAILY
COMMUNITY
Start: 2024-05-21

## 2024-07-24 RX ORDER — ADHESIVE TAPE 3"X 2.3 YD
TAPE, NON-MEDICATED TOPICAL
COMMUNITY

## 2024-07-24 RX ORDER — ESOMEPRAZOLE MAGNESIUM 40 MG/1
40 CAPSULE, DELAYED RELEASE ORAL
Qty: 60 CAPSULE | Refills: 0 | Status: SHIPPED | OUTPATIENT
Start: 2024-07-24 | End: 2024-07-24

## 2024-07-24 RX ORDER — BUPROPION HYDROCHLORIDE 300 MG/1
300 TABLET ORAL DAILY
COMMUNITY

## 2024-07-24 RX ORDER — ATOGEPANT 60 MG/1
60 TABLET ORAL DAILY
Qty: 30 TABLET | Refills: 0 | Status: SHIPPED | OUTPATIENT
Start: 2024-07-24 | End: 2024-07-24

## 2024-07-24 RX ORDER — BUSPIRONE HYDROCHLORIDE 10 MG/1
20 TABLET ORAL 2 TIMES DAILY
COMMUNITY

## 2024-07-24 RX ORDER — LISINOPRIL 5 MG/1
5 TABLET ORAL DAILY
Qty: 90 TABLET | Refills: 0 | Status: SHIPPED | OUTPATIENT
Start: 2024-07-24

## 2024-07-24 RX ORDER — ESOMEPRAZOLE MAGNESIUM 40 MG/1
40 CAPSULE, DELAYED RELEASE ORAL
Qty: 180 CAPSULE | Refills: 0 | Status: SHIPPED | OUTPATIENT
Start: 2024-07-24 | End: 2024-10-22

## 2024-07-24 RX ORDER — LANOLIN ALCOHOL/MO/W.PET/CERES
CREAM (GRAM) TOPICAL DAILY
COMMUNITY

## 2024-07-24 RX ORDER — ESCITALOPRAM OXALATE 20 MG/1
20 TABLET ORAL DAILY
COMMUNITY

## 2024-07-24 RX ORDER — ATOGEPANT 60 MG/1
60 TABLET ORAL DAILY
Qty: 90 TABLET | Refills: 0 | Status: SHIPPED | OUTPATIENT
Start: 2024-07-24 | End: 2024-10-22

## 2024-07-24 RX ORDER — MULTIVIT WITH MINERALS/LUTEIN
1000 TABLET ORAL DAILY
COMMUNITY

## 2024-07-24 RX ORDER — ESOMEPRAZOLE MAGNESIUM 40 MG/1
40 CAPSULE, DELAYED RELEASE ORAL
COMMUNITY
End: 2024-07-24 | Stop reason: SDUPTHER

## 2024-07-24 RX ORDER — UBROGEPANT 100 MG/1
100 TABLET ORAL ONCE AS NEEDED
Qty: 9 TABLET | Refills: 0 | Status: SHIPPED | OUTPATIENT
Start: 2024-07-24

## 2024-07-24 RX ORDER — ALPRAZOLAM 0.5 MG/1
0.25 TABLET ORAL AS NEEDED
COMMUNITY

## 2024-07-24 RX ORDER — CLINDAMYCIN AND BENZOYL PEROXIDE 10; 50 MG/G; MG/G
GEL TOPICAL 2 TIMES DAILY
Qty: 25 G | Refills: 0 | Status: SHIPPED | OUTPATIENT
Start: 2024-07-24

## 2024-07-24 RX ORDER — UBROGEPANT 100 MG/1
100 TABLET ORAL
COMMUNITY
Start: 2024-06-14 | End: 2024-07-24 | Stop reason: SDUPTHER

## 2024-07-24 RX ORDER — ESCITALOPRAM OXALATE 5 MG/1
5 TABLET ORAL DAILY
COMMUNITY
Start: 2024-06-06

## 2024-07-24 NOTE — ASSESSMENT & PLAN NOTE
- well controlled on current Qulipta 60 mg QD and Ubrelvy 100 mg PRN; reports she has been on these medication for years and they control her migraines well; previously managed by her prior PCP  - refilled these medications for pt today  - did recommend seeing neurologist for further management of these medications; pt not interested now but we will re-discuss at her follow up apt

## 2024-07-24 NOTE — PROGRESS NOTES
Adult Annual Physical  Name: Jia Armstrong      : 1990      MRN: [id number]  Encounter Provider: Sydney Riggs PA-C  Encounter Date: 2024   Encounter department: [facility]    Assessment & Plan   1. Other migraine without status migrainosus, not intractable  Assessment & Plan:  - well controlled on current Qulipta 60 mg QD and Ubrelvy 100 mg PRN; reports she has been on these medication for years and they control her migraines well; previously managed by her prior PCP  - refilled these medications for pt today  - did recommend seeing neurologist for further management of these medications; pt not interested now but we will re-discuss at her follow up apt   Orders:  -     Ubrelvy 100 MG tablet; Take 1 tablet (100 mg total) by mouth once as needed (migraines) for up to 1 dose  -     Atogepant (Qulipta) 60 MG TABS; Take 60 mg by mouth daily  2. Anxiety  Assessment & Plan:  - follows with psychiatry for management, Dr. Pearce in [place]   - currently on lexapro 25 mg QD, wellbutrin 450 mg QD, buspar 20 mg BID, clonazepam 0.5 mg TID, and xanax 0.5 mg PRN  -  discussed that xanax and clonazepam are in the same class of medications and that I would not recommend taking both; pt reports her psychiatrist prescribes it this way so she is going to continue taking them as prescribed  - continue seeing psychiatry for management of psychiatric medications   3. Insomnia, unspecified type  Assessment & Plan:  - takes melatonin and sleeps well with it   4. Gastroesophageal reflux disease without esophagitis  Assessment & Plan:  - well controlled on nexium 40 mg BID   - refill provided today  - declines GI referral   Orders:  -     esomeprazole (NexIUM) 40 MG capsule; Take 1 capsule (40 mg total) by mouth 2 (two) times a day before meals  5. Acne, unspecified acne type  Assessment & Plan:  - chronic acne of chest  - will trial benzaclin gel BID  - referral to dermatology placed for further  evaluation   - follow up in one month   Orders:  -     clindamycin-benzoyl peroxide (BENZACLIN) gel; Apply topically 2 (two) times a day  -     Ambulatory Referral to Dermatology; Future  6. Depression, unspecified depression type  Assessment & Plan:  - follows with psychiatry for management, Dr. Pearce in [place]   - currently on lexapro 25 mg QD, wellbutrin 450 mg QD, buspar 20 mg BID, clonazepam 0.5 mg TID, and xanax 0.5 mg PRN  -  discussed that xanax and clonazepam are in the same class of medications and that I would not recommend taking both; pt reports her psychiatrist prescribes it this way so she is going to continue taking them as prescribed  - continue seeing psychiatry for management of psychiatric medications   7. Lupus (HCC)  Assessment & Plan:  - follows with Rheumatology   - well controlled on current plaquenil 200 mg QD, gabapentin 300 mg BID, and benlysta  8. Primary hypertension  Assessment & Plan:  - BP WNL today 108/74 mmHg  - continue current lisinopril 5 mg QD; refill provided today   - update CBC and CMP   Orders:  -     lisinopril (ZESTRIL) 5 mg tablet; Take 1 tablet (5 mg total) by mouth daily  9. Health maintenance examination  Assessment & Plan:  - presents to establish care and for routine physical   - physical exam unremarkable   - ordered routine labs; pt also requests vitamin levels to be ordered as she has a hx of def of vitamin D/B12 which she takes daily and has had issues with her vitamin A in the past as well, did make aware that she should call her insurance to make sure they cover these first   - follows with OBGYN; last visit 7/19 (last week); reports she is UTD on pap and will get records sent over   Orders:  -     CBC and differential; Future  -     Comprehensive metabolic panel; Future  10. Vitamin C deficiency  -     Vitamin C; Future  11. Vitamin D deficiency  -     Vitamin D 25 hydroxy; Future  12. Vitamin B12 deficiency  -     Vitamin B12; Future  13. Iron  deficiency  -     Iron Panel (Includes Ferritin, Iron Sat%, Iron, and TIBC); Future  14. Adverse effect of vitamin A  -     Vitamin A; Future  15. Lipid screening  -     Lipid panel; Future  16. Screening for thyroid disorder  -     TSH, 3rd generation with Free T4 reflex; Future    Immunizations and preventive care screenings were discussed with patient today. Appropriate education was printed on patient's after visit summary.    Follow up in one month or sooner as needed     Counseling:  Dental Health: discussed importance of regular tooth brushing, flossing, and dental visits.  Exercise: the importance of regular exercise/physical activity was discussed. Recommend exercise 3-5 times per week for at least 30 minutes.          History of Present Illness       Patient presents to establish care today. She recently moved to the area.   Patient has anxiety and depression - she has a psychiatrist in [place], Dr. Pearce who she last saw in person on 6/29 and is going to continue following with her routinely. She is currently on lexapro 25 mg QD, wellbutrin 450 mg QD, buspar 20 mg BID, clonazepam 0.5 mg TID, and xanax 0.5 mg PRN. Reports her psychiatrist prescribes these medications. Did make patient aware that xanax and clonazepam are in the same class of medications and that I would not recommend taking both; pt reports her psychiatrist prescribes it this way so she is going to continue taking them as prescribed. Aware.     Patient also has lupus which she sees a rheumatologist in [place] (going to continue seeing them via telehealth) for. She is currently on plaquenil 200 mg QD, gabapentin 300 mg BID, and benlysta which she reports well controls her symptoms. These are going to continue being managed and prescribed by her rheumatologist.     Patient has GERD; takes Nexium 40 mg BID. Reports she has been on this medication for years and her previous PCP would prescribe it for her so will need me to take over  management. Feels her symptoms are well controlled.     Patient has migraines which she is on Qulipta for daily and Ubrelvy PRN; reports these medications were prescribed by her previous PCP and she will need me to take over management. Not interested in seeing a neurologist at this time.     Patient has HTN, controlled on lisinopril 5 mg QD. I will take over management of medication and treatment of this condition.     She also reports acne on her chest; ongoing for years. Would like to get rid of it.     Adult Annual Physical:  Patient presents for annual physical.     Diet and Physical Activity:  - Diet/Nutrition: well balanced diet.  - Exercise: walking.    Depression Screening:  - PHQ-2 Score: 0    General Health:  - Sleep: sleeps well.  - Hearing: normal hearing bilateral ears.  - Vision: no vision problems.  - Dental: brushes teeth twice daily.    /GYN Health:  - Follows with GYN: yes.   - Menopause: premenopausal.   - Contraception: oral contraceptives.      Review of Systems   Constitutional:  Negative for chills, diaphoresis and fever.   Respiratory:  Negative for cough, chest tightness, shortness of breath and wheezing.    Cardiovascular:  Negative for chest pain and palpitations.   Gastrointestinal:  Negative for abdominal pain, constipation, diarrhea, nausea and vomiting.   Genitourinary:  Negative for difficulty urinating.   Neurological:  Positive for headaches (migraines). Negative for dizziness and light-headedness.   Psychiatric/Behavioral:  Negative for self-injury, sleep disturbance and suicidal ideas.      Medical History Reviewed by provider this encounter:  Tobacco  Allergies  Meds  Problems  Med Hx  Surg Hx  Fam Hx       Past Medical History   Past Medical History:   Diagnosis Date    Anxiety     Depression     Hypertension     Insomnia     Lupus (HCC)     Migraines      Past Surgical History:   Procedure Laterality Date    REDUCTION MAMMAPLASTY  2015     Family History   Problem  Relation Age of Onset    Breast cancer Mother     Colon cancer Father     Colon cancer Paternal Uncle      Current Outpatient Medications on File Prior to Visit   Medication Sig Dispense Refill    albuterol (ProAir HFA) 90 mcg/act inhaler Inhale 2 puffs every 6 (six) hours as needed for wheezing 8.5 g 0    Ascorbic Acid (vitamin C) 1000 MG tablet Take 1,000 mg by mouth daily      Benlysta 200 MG/ML SOAJ 200 mg Once a week      buPROPion (WELLBUTRIN XL) 300 mg 24 hr tablet Take 300 mg by mouth daily Tahe 450 mg daily      busPIRone (BUSPAR) 10 mg tablet Take 20 mg by mouth 2 (two) times a day      Cholecalciferol (Vitamin D) 50 MCG (2000 UT) tablet Take 2,000 Units by mouth daily      Diclofenac Sodium (VOLTAREN) 1 % Apply 2 g topically 4 (four) times a day      escitalopram (LEXAPRO) 5 mg tablet Take 5 mg by mouth daily      gabapentin (NEURONTIN) 300 mg capsule Take 300 mg by mouth 2 (two) times a day Take 2 caps BID      Melatonin 3 MG/4ML LIQD Take by mouth      Norethin-Eth Estrad-Fe Biphas 1 MG-10 MCG / 10 MCG TABS Take 1 tablet by mouth in the morning 90 tablet 4    saccharomyces boulardii (FLORASTOR) 250 mg capsule Take 250 mg by mouth 2 (two) times a day      vitamin B-12 (VITAMIN B-12) 1,000 mcg tablet Take by mouth daily      [DISCONTINUED] Atogepant (Qulipta) 60 MG TABS Take 60 mg by mouth daily      [DISCONTINUED] esomeprazole (NexIUM) 40 MG capsule Take 40 mg by mouth 2 (two) times a day before meals      [DISCONTINUED] ibuprofen (MOTRIN) 400 mg tablet Take by mouth every 6 (six) hours as needed for mild pain      [DISCONTINUED] lisinopril (ZESTRIL) 5 mg tablet Take 5 mg by mouth daily      [DISCONTINUED] Ubrelvy 100 MG tablet Take 100 mg by mouth      ALPRAZolam (XANAX) 0.5 mg tablet Take 0.25 mg by mouth if needed      clonazePAM (KlonoPIN) 0.5 mg tablet Take 0.5 mg by mouth 3 (three) times a day      escitalopram (LEXAPRO) 20 mg tablet Take 20 mg by mouth daily      hydroxychloroquine (PLAQUENIL) 200  mg tablet Take 200 mg by mouth 2 (two) times a day with meals      [DISCONTINUED] benzonatate (TESSALON PERLES) 100 mg capsule Take 1 capsule (100 mg total) by mouth 2 (two) times a day as needed for cough (Patient not taking: Reported on 7/24/2024) 21 capsule 0    [DISCONTINUED] benzonatate (TESSALON PERLES) 100 mg capsule Take 1 capsule (100 mg total) by mouth every 8 (eight) hours (Patient not taking: Reported on 7/24/2024) 9 capsule 0     No current facility-administered medications on file prior to visit.     Allergies   Allergen Reactions    Bee Venom Anaphylaxis and Hives    Amoxicillin Hives    Topamax [Topiramate] Delirium    Onion - Food Allergy Headache     Per RN, raw onions are a headache trigger    Per RN, raw onions are a headache trigger   Other reaction(s): Headache   Per RN, raw onions are no longer a headache trigger      Current Outpatient Medications on File Prior to Visit   Medication Sig Dispense Refill    albuterol (ProAir HFA) 90 mcg/act inhaler Inhale 2 puffs every 6 (six) hours as needed for wheezing 8.5 g 0    Ascorbic Acid (vitamin C) 1000 MG tablet Take 1,000 mg by mouth daily      Benlysta 200 MG/ML SOAJ 200 mg Once a week      buPROPion (WELLBUTRIN XL) 300 mg 24 hr tablet Take 300 mg by mouth daily Tahe 450 mg daily      busPIRone (BUSPAR) 10 mg tablet Take 20 mg by mouth 2 (two) times a day      Cholecalciferol (Vitamin D) 50 MCG (2000 UT) tablet Take 2,000 Units by mouth daily      Diclofenac Sodium (VOLTAREN) 1 % Apply 2 g topically 4 (four) times a day      escitalopram (LEXAPRO) 5 mg tablet Take 5 mg by mouth daily      gabapentin (NEURONTIN) 300 mg capsule Take 300 mg by mouth 2 (two) times a day Take 2 caps BID      Melatonin 3 MG/4ML LIQD Take by mouth      Norethin-Eth Estrad-Fe Biphas 1 MG-10 MCG / 10 MCG TABS Take 1 tablet by mouth in the morning 90 tablet 4    saccharomyces boulardii (FLORASTOR) 250 mg capsule Take 250 mg by mouth 2 (two) times a day      vitamin B-12  "(VITAMIN B-12) 1,000 mcg tablet Take by mouth daily      [DISCONTINUED] Atogepant (Qulipta) 60 MG TABS Take 60 mg by mouth daily      [DISCONTINUED] esomeprazole (NexIUM) 40 MG capsule Take 40 mg by mouth 2 (two) times a day before meals      [DISCONTINUED] ibuprofen (MOTRIN) 400 mg tablet Take by mouth every 6 (six) hours as needed for mild pain      [DISCONTINUED] lisinopril (ZESTRIL) 5 mg tablet Take 5 mg by mouth daily      [DISCONTINUED] Ubrelvy 100 MG tablet Take 100 mg by mouth      ALPRAZolam (XANAX) 0.5 mg tablet Take 0.25 mg by mouth if needed      clonazePAM (KlonoPIN) 0.5 mg tablet Take 0.5 mg by mouth 3 (three) times a day      escitalopram (LEXAPRO) 20 mg tablet Take 20 mg by mouth daily      hydroxychloroquine (PLAQUENIL) 200 mg tablet Take 200 mg by mouth 2 (two) times a day with meals      [DISCONTINUED] benzonatate (TESSALON PERLES) 100 mg capsule Take 1 capsule (100 mg total) by mouth 2 (two) times a day as needed for cough (Patient not taking: Reported on 7/24/2024) 21 capsule 0    [DISCONTINUED] benzonatate (TESSALON PERLES) 100 mg capsule Take 1 capsule (100 mg total) by mouth every 8 (eight) hours (Patient not taking: Reported on 7/24/2024) 9 capsule 0     No current facility-administered medications on file prior to visit.      Social History     Tobacco Use    Smoking status: Never    Smokeless tobacco: Never   Vaping Use    Vaping status: Never Used   Substance and Sexual Activity    Alcohol use: Not Currently    Drug use: Yes     Types: Marijuana    Sexual activity: Yes       Objective     /74 (BP Location: Left arm, Patient Position: Sitting, Cuff Size: Large)   Pulse 76   Temp 98.2 °F (36.8 °C)   Ht 5' 6\" (1.676 m)   Wt 103 kg (228 lb)   LMP 06/25/2024 (Exact Date)   SpO2 99%   BMI 36.80 kg/m²     Physical Exam  Vitals reviewed.   Constitutional:       General: She is not in acute distress.     Appearance: Normal appearance. She is not ill-appearing or diaphoretic.   HENT: "      Head: Normocephalic and atraumatic.      Right Ear: Tympanic membrane, ear canal and external ear normal. There is no impacted cerumen.      Left Ear: Tympanic membrane, ear canal and external ear normal. There is no impacted cerumen.      Nose: Nose normal.      Mouth/Throat:      Mouth: Mucous membranes are moist.      Pharynx: Oropharynx is clear. No oropharyngeal exudate or posterior oropharyngeal erythema.   Eyes:      General:         Right eye: No discharge.         Left eye: No discharge.      Conjunctiva/sclera: Conjunctivae normal.   Cardiovascular:      Rate and Rhythm: Normal rate and regular rhythm.      Pulses: Normal pulses.      Heart sounds: Normal heart sounds. No murmur heard.  Pulmonary:      Effort: Pulmonary effort is normal. No respiratory distress.      Breath sounds: Normal breath sounds. No wheezing, rhonchi or rales.   Abdominal:      General: Bowel sounds are normal. There is no distension.      Palpations: Abdomen is soft. There is no mass.      Tenderness: There is no abdominal tenderness. There is no guarding or rebound.      Hernia: No hernia is present.   Musculoskeletal:         General: Normal range of motion.      Cervical back: Normal range of motion and neck supple.      Right lower leg: No edema.      Left lower leg: No edema.   Lymphadenopathy:      Cervical: No cervical adenopathy.   Skin:     General: Skin is warm.   Neurological:      General: No focal deficit present.      Mental Status: She is alert.      Gait: Gait normal.   Psychiatric:         Mood and Affect: Mood normal.

## 2024-07-24 NOTE — ASSESSMENT & PLAN NOTE
- follows with psychiatry for management, Dr. Pearce in [place]   - currently on lexapro 25 mg QD, wellbutrin 450 mg QD, buspar 20 mg BID, clonazepam 0.5 mg TID, and xanax 0.5 mg PRN  -  discussed that xanax and clonazepam are in the same class of medications and that I would not recommend taking both; pt reports her psychiatrist prescribes it this way so she is going to continue taking them as prescribed  - continue seeing psychiatry for management of psychiatric medications

## 2024-07-24 NOTE — ASSESSMENT & PLAN NOTE
- BP WNL today 108/74 mmHg  - continue current lisinopril 5 mg QD; refill provided today   - update CBC and CMP

## 2024-07-24 NOTE — ASSESSMENT & PLAN NOTE
- chronic acne of chest  - will trial benzaclin gel BID  - referral to dermatology placed for further evaluation   - follow up in one month

## 2024-07-24 NOTE — ASSESSMENT & PLAN NOTE
- follows with Rheumatology   - well controlled on current plaquenil 200 mg QD, gabapentin 300 mg BID, and benlysta

## 2024-07-24 NOTE — ASSESSMENT & PLAN NOTE
- follows with psychiatry for management, Dr. Pearce in [place]   - currently on lexapro 25 mg QD, wellbutrin 450 mg QD, buspar 20 mg BID, clonazepam 0.5 mg TID, and xanax 0.5 mg PRN  -  discussed that xanax and clonazepam are in the same class of medications and that I would not recommend taking both; pt reports her psychiatrist prescribes it this way so she is going to continue taking them as prescribed  - continue seeing psychiatry for management of psychiatric medications

## 2024-07-24 NOTE — ASSESSMENT & PLAN NOTE
- presents to establish care and for routine physical   - physical exam unremarkable   - ordered routine labs; pt also requests vitamin levels to be ordered as she has a hx of def of vitamin D/B12 which she takes daily and has had issues with her vitamin A in the past as well, did make aware that she should call her insurance to make sure they cover these first   - follows with OBGYN; last visit 7/19 (last week); reports she is UTD on pap and will get records sent over

## 2024-08-21 DIAGNOSIS — R73.9 HYPERGLYCEMIA: ICD-10-CM

## 2024-08-21 DIAGNOSIS — R79.89 ABNORMAL CBC: Primary | ICD-10-CM

## 2024-08-23 PROBLEM — Z00.00 HEALTH MAINTENANCE EXAMINATION: Status: RESOLVED | Noted: 2024-07-24 | Resolved: 2024-08-23

## 2024-08-29 LAB
25(OH)D3+25(OH)D2 SERPL-MCNC: 43.5 NG/ML (ref 30–100)
ALBUMIN SERPL-MCNC: 4.3 G/DL (ref 3.9–4.9)
ALP SERPL-CCNC: 77 IU/L (ref 44–121)
ALT SERPL-CCNC: 10 IU/L (ref 0–32)
AST SERPL-CCNC: 14 IU/L (ref 0–40)
BASOPHILS # BLD AUTO: 0.1 X10E3/UL (ref 0–0.2)
BASOPHILS NFR BLD AUTO: 1 %
BILIRUB SERPL-MCNC: 0.7 MG/DL (ref 0–1.2)
BUN SERPL-MCNC: 9 MG/DL (ref 6–20)
BUN/CREAT SERPL: 9 (ref 9–23)
CALCIUM SERPL-MCNC: 9.6 MG/DL (ref 8.7–10.2)
CHLORIDE SERPL-SCNC: 103 MMOL/L (ref 96–106)
CHOLEST SERPL-MCNC: 187 MG/DL (ref 100–199)
CO2 SERPL-SCNC: 21 MMOL/L (ref 20–29)
CREAT SERPL-MCNC: 0.96 MG/DL (ref 0.57–1)
EGFR: 79 ML/MIN/1.73
EOSINOPHIL # BLD AUTO: 0.1 X10E3/UL (ref 0–0.4)
EOSINOPHIL NFR BLD AUTO: 1 %
ERYTHROCYTE [DISTWIDTH] IN BLOOD BY AUTOMATED COUNT: 12.2 % (ref 11.7–15.4)
GLOBULIN SER-MCNC: 2.4 G/DL (ref 1.5–4.5)
GLUCOSE SERPL-MCNC: 108 MG/DL (ref 70–99)
HCT VFR BLD AUTO: 44.5 % (ref 34–46.6)
HDLC SERPL-MCNC: 51 MG/DL
HGB BLD-MCNC: 14.7 G/DL (ref 11.1–15.9)
IMM GRANULOCYTES # BLD: 0 X10E3/UL (ref 0–0.1)
IMM GRANULOCYTES NFR BLD: 0 %
LDLC SERPL CALC-MCNC: 109 MG/DL (ref 0–99)
LYMPHOCYTES # BLD AUTO: 1.1 X10E3/UL (ref 0.7–3.1)
LYMPHOCYTES NFR BLD AUTO: 9 %
MCH RBC QN AUTO: 29.9 PG (ref 26.6–33)
MCHC RBC AUTO-ENTMCNC: 33 G/DL (ref 31.5–35.7)
MCV RBC AUTO: 91 FL (ref 79–97)
MONOCYTES # BLD AUTO: 0.5 X10E3/UL (ref 0.1–0.9)
MONOCYTES NFR BLD AUTO: 4 %
NEUTROPHILS # BLD AUTO: 10.2 X10E3/UL (ref 1.4–7)
NEUTROPHILS NFR BLD AUTO: 85 %
PLATELET # BLD AUTO: 302 X10E3/UL (ref 150–450)
POTASSIUM SERPL-SCNC: 4.6 MMOL/L (ref 3.5–5.2)
PROT SERPL-MCNC: 6.7 G/DL (ref 6–8.5)
RBC # BLD AUTO: 4.91 X10E6/UL (ref 3.77–5.28)
SL AMB VLDL CHOLESTEROL CALC: 27 MG/DL (ref 5–40)
SODIUM SERPL-SCNC: 140 MMOL/L (ref 134–144)
TRIGL SERPL-MCNC: 155 MG/DL (ref 0–149)
TSH SERPL DL<=0.005 MIU/L-ACNC: 0.94 UIU/ML (ref 0.45–4.5)
VIT A SERPL-MCNC: 72.4 UG/DL (ref 18.9–57.3)
VIT B12 SERPL-MCNC: 1113 PG/ML (ref 232–1245)
VIT C SERPL-MCNC: 1 MG/DL (ref 0.4–2)
WBC # BLD AUTO: 11.8 X10E3/UL (ref 3.4–10.8)

## 2024-09-18 ENCOUNTER — TELEPHONE (OUTPATIENT)
Dept: FAMILY MEDICINE CLINIC | Facility: CLINIC | Age: 34
End: 2024-09-18

## 2024-09-18 ENCOUNTER — OFFICE VISIT (OUTPATIENT)
Dept: FAMILY MEDICINE CLINIC | Facility: CLINIC | Age: 34
End: 2024-09-18
Payer: COMMERCIAL

## 2024-09-18 ENCOUNTER — APPOINTMENT (OUTPATIENT)
Dept: RADIOLOGY | Facility: CLINIC | Age: 34
End: 2024-09-18
Payer: COMMERCIAL

## 2024-09-18 ENCOUNTER — HOSPITAL ENCOUNTER (EMERGENCY)
Facility: HOSPITAL | Age: 34
Discharge: HOME | End: 2024-09-18
Attending: STUDENT IN AN ORGANIZED HEALTH CARE EDUCATION/TRAINING PROGRAM
Payer: COMMERCIAL

## 2024-09-18 VITALS
OXYGEN SATURATION: 97 % | WEIGHT: 220.8 LBS | BODY MASS INDEX: 35.48 KG/M2 | HEIGHT: 66 IN | SYSTOLIC BLOOD PRESSURE: 114 MMHG | TEMPERATURE: 99.5 F | HEART RATE: 102 BPM | DIASTOLIC BLOOD PRESSURE: 80 MMHG

## 2024-09-18 VITALS
SYSTOLIC BLOOD PRESSURE: 128 MMHG | RESPIRATION RATE: 22 BRPM | DIASTOLIC BLOOD PRESSURE: 60 MMHG | OXYGEN SATURATION: 98 % | HEART RATE: 84 BPM | TEMPERATURE: 98.3 F

## 2024-09-18 DIAGNOSIS — R53.83 FATIGUE: ICD-10-CM

## 2024-09-18 DIAGNOSIS — J02.9 SORE THROAT: ICD-10-CM

## 2024-09-18 DIAGNOSIS — B34.9 VIRAL SYNDROME: Primary | ICD-10-CM

## 2024-09-18 DIAGNOSIS — G43.809 OTHER MIGRAINE WITHOUT STATUS MIGRAINOSUS, NOT INTRACTABLE: ICD-10-CM

## 2024-09-18 DIAGNOSIS — I10 PRIMARY HYPERTENSION: ICD-10-CM

## 2024-09-18 DIAGNOSIS — J40 BRONCHITIS: ICD-10-CM

## 2024-09-18 DIAGNOSIS — E86.0 DEHYDRATION: ICD-10-CM

## 2024-09-18 DIAGNOSIS — J40 BRONCHITIS: Primary | ICD-10-CM

## 2024-09-18 DIAGNOSIS — T45.2X5A: ICD-10-CM

## 2024-09-18 DIAGNOSIS — R42 LIGHTHEADEDNESS: ICD-10-CM

## 2024-09-18 DIAGNOSIS — M32.9 LUPUS: ICD-10-CM

## 2024-09-18 LAB
ANION GAP SERPL CALCULATED.3IONS-SCNC: 7 MMOL/L (ref 4–13)
ATRIAL RATE: 88 BPM
BASOPHILS # BLD AUTO: 0.06 THOUSANDS/ΜL (ref 0–0.1)
BASOPHILS NFR BLD AUTO: 1 % (ref 0–1)
BUN SERPL-MCNC: 6 MG/DL (ref 5–25)
CALCIUM SERPL-MCNC: 9.1 MG/DL (ref 8.4–10.2)
CHLORIDE SERPL-SCNC: 104 MMOL/L (ref 96–108)
CO2 SERPL-SCNC: 25 MMOL/L (ref 21–32)
CREAT SERPL-MCNC: 0.93 MG/DL (ref 0.6–1.3)
EOSINOPHIL # BLD AUTO: 0.14 THOUSAND/ΜL (ref 0–0.61)
EOSINOPHIL NFR BLD AUTO: 1 % (ref 0–6)
ERYTHROCYTE [DISTWIDTH] IN BLOOD BY AUTOMATED COUNT: 12.2 % (ref 11.6–15.1)
FLUAV AG UPPER RESP QL IA.RAPID: NEGATIVE
FLUBV AG UPPER RESP QL IA.RAPID: NEGATIVE
GFR SERPL CREATININE-BSD FRML MDRD: 79 ML/MIN/1.73SQ M
GLUCOSE SERPL-MCNC: 86 MG/DL (ref 65–140)
HCT VFR BLD AUTO: 43.5 % (ref 34.8–46.1)
HGB BLD-MCNC: 14.6 G/DL (ref 11.5–15.4)
IMM GRANULOCYTES # BLD AUTO: 0.03 THOUSAND/UL (ref 0–0.2)
IMM GRANULOCYTES NFR BLD AUTO: 0 % (ref 0–2)
LYMPHOCYTES # BLD AUTO: 1.43 THOUSANDS/ΜL (ref 0.6–4.47)
LYMPHOCYTES NFR BLD AUTO: 14 % (ref 14–44)
MCH RBC QN AUTO: 29.6 PG (ref 26.8–34.3)
MCHC RBC AUTO-ENTMCNC: 33.6 G/DL (ref 31.4–37.4)
MCV RBC AUTO: 88 FL (ref 82–98)
MONOCYTES # BLD AUTO: 1.1 THOUSAND/ΜL (ref 0.17–1.22)
MONOCYTES NFR BLD AUTO: 11 % (ref 4–12)
NEUTROPHILS # BLD AUTO: 7.74 THOUSANDS/ΜL (ref 1.85–7.62)
NEUTS SEG NFR BLD AUTO: 73 % (ref 43–75)
NRBC BLD AUTO-RTO: 0 /100 WBCS
P AXIS: 61 DEGREES
PLATELET # BLD AUTO: 274 THOUSANDS/UL (ref 149–390)
PMV BLD AUTO: 10 FL (ref 8.9–12.7)
POTASSIUM SERPL-SCNC: 3.8 MMOL/L (ref 3.5–5.3)
PR INTERVAL: 132 MS
QRS AXIS: 70 DEGREES
QRSD INTERVAL: 84 MS
QT INTERVAL: 374 MS
QTC INTERVAL: 452 MS
RBC # BLD AUTO: 4.93 MILLION/UL (ref 3.81–5.12)
S PYO AG THROAT QL: NEGATIVE
SARS-COV+SARS-COV-2 AG RESP QL IA.RAPID: NEGATIVE
SARS-COV-2 AG UPPER RESP QL IA: NEGATIVE
SL AMB POCT RAPID FLU A: NEGATIVE
SL AMB POCT RAPID FLU B: NEGATIVE
SODIUM SERPL-SCNC: 136 MMOL/L (ref 135–147)
T WAVE AXIS: 33 DEGREES
VALID CONTROL: NORMAL
VENTRICULAR RATE: 88 BPM
WBC # BLD AUTO: 10.5 THOUSAND/UL (ref 4.31–10.16)

## 2024-09-18 PROCEDURE — 87880 STREP A ASSAY W/OPTIC: CPT

## 2024-09-18 PROCEDURE — 99215 OFFICE O/P EST HI 40 MIN: CPT

## 2024-09-18 PROCEDURE — 96360 HYDRATION IV INFUSION INIT: CPT

## 2024-09-18 PROCEDURE — 71046 X-RAY EXAM CHEST 2 VIEWS: CPT

## 2024-09-18 PROCEDURE — 93010 ELECTROCARDIOGRAM REPORT: CPT | Performed by: INTERNAL MEDICINE

## 2024-09-18 PROCEDURE — 99284 EMERGENCY DEPT VISIT MOD MDM: CPT

## 2024-09-18 PROCEDURE — 99283 EMERGENCY DEPT VISIT LOW MDM: CPT

## 2024-09-18 PROCEDURE — 87811 SARS-COV-2 COVID19 W/OPTIC: CPT

## 2024-09-18 PROCEDURE — 93005 ELECTROCARDIOGRAM TRACING: CPT

## 2024-09-18 PROCEDURE — 85025 COMPLETE CBC W/AUTO DIFF WBC: CPT

## 2024-09-18 PROCEDURE — 36415 COLL VENOUS BLD VENIPUNCTURE: CPT

## 2024-09-18 PROCEDURE — 87804 INFLUENZA ASSAY W/OPTIC: CPT

## 2024-09-18 PROCEDURE — 80048 BASIC METABOLIC PNL TOTAL CA: CPT

## 2024-09-18 RX ORDER — ALBUTEROL SULFATE 90 UG/1
2 INHALANT RESPIRATORY (INHALATION) EVERY 6 HOURS PRN
Qty: 6.7 G | Refills: 0 | Status: SHIPPED | OUTPATIENT
Start: 2024-09-18

## 2024-09-18 RX ORDER — AZITHROMYCIN 250 MG/1
TABLET, FILM COATED ORAL
Qty: 6 TABLET | Refills: 0 | Status: SHIPPED | OUTPATIENT
Start: 2024-09-18 | End: 2024-09-22

## 2024-09-18 RX ORDER — IBUPROFEN 600 MG/1
600 TABLET, FILM COATED ORAL ONCE
Status: COMPLETED | OUTPATIENT
Start: 2024-09-18 | End: 2024-09-18

## 2024-09-18 RX ORDER — DULOXETIN HYDROCHLORIDE 30 MG/1
30 CAPSULE, DELAYED RELEASE ORAL 2 TIMES DAILY
COMMUNITY
Start: 2024-09-06

## 2024-09-18 RX ORDER — FLUTICASONE PROPIONATE 50 MCG
1 SPRAY, SUSPENSION (ML) NASAL DAILY
Qty: 16 G | Refills: 0 | Status: SHIPPED | OUTPATIENT
Start: 2024-09-18

## 2024-09-18 RX ORDER — CETIRIZINE HYDROCHLORIDE 10 MG/1
10 TABLET ORAL DAILY
Qty: 30 TABLET | Refills: 0 | Status: SHIPPED | OUTPATIENT
Start: 2024-09-18

## 2024-09-18 RX ORDER — METHYLPREDNISOLONE 4 MG
TABLET, DOSE PACK ORAL
Qty: 21 EACH | Refills: 0 | Status: SHIPPED | OUTPATIENT
Start: 2024-09-18

## 2024-09-18 RX ADMIN — IBUPROFEN 600 MG: 600 TABLET, FILM COATED ORAL at 14:56

## 2024-09-18 RX ADMIN — SODIUM CHLORIDE 1000 ML: 0.9 INJECTION, SOLUTION INTRAVENOUS at 14:55

## 2024-09-18 NOTE — ASSESSMENT & PLAN NOTE
- break through headaches despite being on Qulipta 60 mg QD and Ubrelvy 100 mg PRN; recommended seeing neurology for further evaluation which she was agreeable with -- therefore referral placed today     Orders:    Ambulatory Referral to Neurology; Future

## 2024-09-18 NOTE — PROGRESS NOTES
Ambulatory Visit  Name: Jia Armstrong      : 1990      MRN: [id number]  Encounter Provider: Sydney Riggs PA-C  Encounter Date: 2024   Encounter department: [facility]    Assessment & Plan  Bronchitis  - presents for evaluation of cough, congestion, sore throat, fevers x 4 days  - rapid flu, covid, strep all negative today in office  - physical exam reveals pt to be ill appearing and diaphoretic with scattered wheezing in lung fields; O2 97% on room air -- dry mucous membranes  - ordered CXR to rule out pneumonia as pt is high risk given immunosuppression  - treating for bronchitis with azithromycin and medrol pack to cover for bacterial infxn (allergic to PCN); also gave albuterol inhaler to use PRN   - based on exam findings and symptoms patient is dehydrated and feels like she won't be able to hydrate herself at home -- therefore recommended ER for IVF which she was agreeable with; therefore ADT order placed to [facility] for ER evaluation   - advised to continue supportive care with tylenol, rest     Orders:    azithromycin (ZITHROMAX) 250 mg tablet; Take 2 tablets today then 1 tablet daily x 4 days    XR chest pa and lateral; Future    methylPREDNISolone 4 MG tablet therapy pack; Use as directed on package    Dehydration  - going to ER for IVF     Orders:    Transfer to other facility    albuterol (Proventil HFA) 90 mcg/act inhaler; Inhale 2 puffs every 6 (six) hours as needed for wheezing or shortness of breath    Lupus (HCC)  - follows with rheumatology   - currently on plaquenil 200 mg QD, gabapentin 300 mg BID, and benlysta        Other migraine without status migrainosus, not intractable  - break through headaches despite being on Qulipta 60 mg QD and Ubrelvy 100 mg PRN; recommended seeing neurology for further evaluation which she was agreeable with -- therefore referral placed today     Orders:    Ambulatory Referral to Neurology; Future    Primary hypertension  - BP  WNL, 114/80 mmHg   - continue current lisinopril          Adverse effect of vitamin A  - chronically high vitamin A levels, prior PCP recommended seeing hematology but she never got around to it  - therefore placed referral to hematology and nutrition for further evaluation     Orders:    Ambulatory Referral to Hematology / Oncology; Future    Ambulatory Referral to Nutrition Services; Future    Lightheadedness    Orders:    Transfer to other facility    albuterol (Proventil HFA) 90 mcg/act inhaler; Inhale 2 puffs every 6 (six) hours as needed for wheezing or shortness of breath    Sore throat    Orders:    POCT rapid ANTIGEN strepA    POCT Rapid Covid Ag    POCT rapid flu A and B       History of Present Illness       CC: cough, congestion, fever, sore throat, dehydration     Patient with lupus on immunosuppressive therapy with plaquenil presents for evaluation of cough, congestion, fevers, sore throat, and dehydration x 4 days. Patient reports they had a birthday party for her children over the weekend and one of the kids were sick -- reports illnesses usually hit her harder due to being immunosuppressed. She has been taking mucinex. Admits to no appetite and has not been hydrating well due to not feeling well. Having some wheezing and shortness of breath when coughing.       History obtained from : patient  Review of Systems   Constitutional:  Positive for appetite change, chills, diaphoresis, fatigue and fever.   HENT:  Positive for congestion, postnasal drip, rhinorrhea, sinus pressure, sinus pain and sore throat. Negative for ear pain.    Respiratory:  Positive for cough and shortness of breath. Negative for chest tightness and wheezing.    Cardiovascular:  Negative for chest pain and palpitations.   Gastrointestinal:  Negative for abdominal pain.   Neurological:  Positive for dizziness, light-headedness and headaches.     Medical History Reviewed by provider this encounter:  Tobacco  Allergies  Meds   Problems  Med Hx  Surg Hx  Fam Hx       Past Medical History   Past Medical History:   Diagnosis Date    Anxiety     Depression     Hypertension     Insomnia     Lupus (HCC)     Migraines      Past Surgical History:   Procedure Laterality Date    REDUCTION MAMMAPLASTY  2015     Family History   Problem Relation Age of Onset    Breast cancer Mother     Colon cancer Father     Colon cancer Paternal Uncle      Current Outpatient Medications on File Prior to Visit   Medication Sig Dispense Refill    DULoxetine (CYMBALTA) 30 mg delayed release capsule Take 30 mg by mouth 2 (two) times a day      ALPRAZolam (XANAX) 0.5 mg tablet Take 0.25 mg by mouth if needed      Ascorbic Acid (vitamin C) 1000 MG tablet Take 1,000 mg by mouth daily      Atogepant (Qulipta) 60 MG TABS Take 60 mg by mouth daily 90 tablet 0    Benlysta 200 MG/ML SOAJ 200 mg Once a week      buPROPion (WELLBUTRIN XL) 300 mg 24 hr tablet Take 300 mg by mouth daily Tahe 450 mg daily      busPIRone (BUSPAR) 10 mg tablet Take 20 mg by mouth 2 (two) times a day      Cholecalciferol (Vitamin D) 50 MCG (2000 UT) tablet Take 2,000 Units by mouth daily      clindamycin-benzoyl peroxide (BENZACLIN) gel Apply topically 2 (two) times a day 25 g 0    clonazePAM (KlonoPIN) 0.5 mg tablet Take 0.5 mg by mouth 3 (three) times a day      Diclofenac Sodium (VOLTAREN) 1 % Apply 2 g topically 4 (four) times a day      escitalopram (LEXAPRO) 20 mg tablet Take 20 mg by mouth daily (Patient not taking: Reported on 9/18/2024)      escitalopram (LEXAPRO) 5 mg tablet Take 5 mg by mouth daily (Patient not taking: Reported on 9/18/2024)      esomeprazole (NexIUM) 40 MG capsule Take 1 capsule (40 mg total) by mouth 2 (two) times a day before meals 180 capsule 0    gabapentin (NEURONTIN) 300 mg capsule Take 300 mg by mouth 2 (two) times a day Take 2 caps BID      hydroxychloroquine (PLAQUENIL) 200 mg tablet Take 200 mg by mouth 2 (two) times a day with meals      lisinopril (ZESTRIL)  5 mg tablet Take 1 tablet (5 mg total) by mouth daily 90 tablet 0    Melatonin 3 MG/4ML LIQD Take by mouth      Norethin-Eth Estrad-Fe Biphas 1 MG-10 MCG / 10 MCG TABS Take 1 tablet by mouth in the morning 90 tablet 4    saccharomyces boulardii (FLORASTOR) 250 mg capsule Take 250 mg by mouth 2 (two) times a day      Ubrelvy 100 MG tablet Take 1 tablet (100 mg total) by mouth once as needed (migraines) for up to 1 dose 9 tablet 0    vitamin B-12 (VITAMIN B-12) 1,000 mcg tablet Take by mouth daily      [DISCONTINUED] albuterol (ProAir HFA) 90 mcg/act inhaler Inhale 2 puffs every 6 (six) hours as needed for wheezing 8.5 g 0     No current facility-administered medications on file prior to visit.     Allergies   Allergen Reactions    Bee Venom Anaphylaxis and Hives    Amoxicillin Hives    Topamax [Topiramate] Delirium    Onion - Food Allergy Headache     Per RN, raw onions are a headache trigger    Per RN, raw onions are a headache trigger   Other reaction(s): Headache   Per RN, raw onions are no longer a headache trigger      Current Outpatient Medications on File Prior to Visit   Medication Sig Dispense Refill    DULoxetine (CYMBALTA) 30 mg delayed release capsule Take 30 mg by mouth 2 (two) times a day      ALPRAZolam (XANAX) 0.5 mg tablet Take 0.25 mg by mouth if needed      Ascorbic Acid (vitamin C) 1000 MG tablet Take 1,000 mg by mouth daily      Atogepant (Qulipta) 60 MG TABS Take 60 mg by mouth daily 90 tablet 0    Benlysta 200 MG/ML SOAJ 200 mg Once a week      buPROPion (WELLBUTRIN XL) 300 mg 24 hr tablet Take 300 mg by mouth daily Tahe 450 mg daily      busPIRone (BUSPAR) 10 mg tablet Take 20 mg by mouth 2 (two) times a day      Cholecalciferol (Vitamin D) 50 MCG (2000 UT) tablet Take 2,000 Units by mouth daily      clindamycin-benzoyl peroxide (BENZACLIN) gel Apply topically 2 (two) times a day 25 g 0    clonazePAM (KlonoPIN) 0.5 mg tablet Take 0.5 mg by mouth 3 (three) times a day      Diclofenac Sodium  "(VOLTAREN) 1 % Apply 2 g topically 4 (four) times a day      escitalopram (LEXAPRO) 20 mg tablet Take 20 mg by mouth daily (Patient not taking: Reported on 9/18/2024)      escitalopram (LEXAPRO) 5 mg tablet Take 5 mg by mouth daily (Patient not taking: Reported on 9/18/2024)      esomeprazole (NexIUM) 40 MG capsule Take 1 capsule (40 mg total) by mouth 2 (two) times a day before meals 180 capsule 0    gabapentin (NEURONTIN) 300 mg capsule Take 300 mg by mouth 2 (two) times a day Take 2 caps BID      hydroxychloroquine (PLAQUENIL) 200 mg tablet Take 200 mg by mouth 2 (two) times a day with meals      lisinopril (ZESTRIL) 5 mg tablet Take 1 tablet (5 mg total) by mouth daily 90 tablet 0    Melatonin 3 MG/4ML LIQD Take by mouth      Norethin-Eth Estrad-Fe Biphas 1 MG-10 MCG / 10 MCG TABS Take 1 tablet by mouth in the morning 90 tablet 4    saccharomyces boulardii (FLORASTOR) 250 mg capsule Take 250 mg by mouth 2 (two) times a day      Ubrelvy 100 MG tablet Take 1 tablet (100 mg total) by mouth once as needed (migraines) for up to 1 dose 9 tablet 0    vitamin B-12 (VITAMIN B-12) 1,000 mcg tablet Take by mouth daily      [DISCONTINUED] albuterol (ProAir HFA) 90 mcg/act inhaler Inhale 2 puffs every 6 (six) hours as needed for wheezing 8.5 g 0     No current facility-administered medications on file prior to visit.      Social History     Tobacco Use    Smoking status: Never     Passive exposure: Never    Smokeless tobacco: Never   Vaping Use    Vaping status: Never Used   Substance and Sexual Activity    Alcohol use: Not Currently    Drug use: Yes     Types: Marijuana    Sexual activity: Yes     Objective     /80   Pulse 102   Temp 99.5 °F (37.5 °C)   Ht 5' 6\" (1.676 m)   Wt 100 kg (220 lb 12.8 oz)   SpO2 97%   BMI 35.64 kg/m²     Physical Exam  Vitals reviewed.   Constitutional:       General: She is not in acute distress.     Appearance: Normal appearance. She is ill-appearing and diaphoretic.   HENT:      " Head: Normocephalic and atraumatic.      Right Ear: Tympanic membrane, ear canal and external ear normal. There is no impacted cerumen.      Left Ear: Tympanic membrane, ear canal and external ear normal. There is no impacted cerumen.      Nose: Congestion present. No rhinorrhea.      Mouth/Throat:      Mouth: Mucous membranes are moist.      Pharynx: Oropharynx is clear. Posterior oropharyngeal erythema present. No oropharyngeal exudate.   Eyes:      General:         Right eye: No discharge.         Left eye: No discharge.      Conjunctiva/sclera: Conjunctivae normal.   Cardiovascular:      Rate and Rhythm: Normal rate and regular rhythm.      Pulses: Normal pulses.      Heart sounds: Normal heart sounds. No murmur heard.  Pulmonary:      Effort: Pulmonary effort is normal. No respiratory distress.      Breath sounds: Wheezing (scattered) present. No rhonchi or rales.   Musculoskeletal:      Cervical back: Normal range of motion and neck supple.   Lymphadenopathy:      Cervical: No cervical adenopathy.   Skin:     General: Skin is warm.   Neurological:      General: No focal deficit present.      Mental Status: She is alert.

## 2024-09-18 NOTE — ED PROVIDER NOTES
1. Viral syndrome    2. Fatigue      ED Disposition       ED Disposition   Discharge    Condition   Stable    Date/Time   Wed Sep 18, 2024  5:03 PM    Comment   Jia Armstrong discharge to home/self care.                   Assessment & Plan       Medical Decision Making  Patient is a 34-year-old female presenting for evaluation of viral-like syndromes and possible dehydration.  Discussed with patient we will obtain lab work to evaluate for dehydration and infection.  Will provide IV hydration.  Motrin for headache and bodyaches.  Patient requesting recheck of COVID/flu swab as the one in office was a rapid test. Patient is agreeable.    Blood work grossly unremarkable.  COVID/flu negative.  Upon reexamination, patient is sleeping comfortably in room.  Returned to room to reassess patient and she states that she is feeling much better after receiving IV hydration.  States that she feels comfortable with discharge at this time.  Advised patient that she should continue with prescriptions sent in by her primary care provider earlier today.  Added on fluticasone for nasal congestion.  Advised patient that, if she still continues to experience nasal congestion and runny nose after completing her course of antibiotics and steroids by PCP, she may add on cetirizine once daily.  Encouraged rest increase fluid intake.  Encouraged follow-up with PCP to return to ED for any worsening symptoms.  Patient verbalizes understanding of discharge instructions and follow-up care at this time.    Amount and/or Complexity of Data Reviewed  Labs: ordered. Decision-making details documented in ED Course.     Details: Reviewed     Risk  Prescription drug management.              ED Course as of 09/18/24 1721   Wed Sep 18, 2024   1550 FLU/COVID Rapid Antigen (30 min. TAT) - Preferred screening test in ED  negative   1550 Basic metabolic panel   1609 Patient sleeping comfortably upon reevaluation.       Medications   sodium chloride 0.9 %  "bolus 1,000 mL (1,000 mL Intravenous New Bag 9/18/24 4097)   ibuprofen (MOTRIN) tablet 600 mg (600 mg Oral Given 9/18/24 7229)       History of Present Illness       Chief Complaint   Patient presents with    Fever     Fevers, congestion, cough, sent by pcp for \"adverse effect of vitamin A, dehydration with dizziness/lightheadedness and has not been drinking. Needs IVF\"       Patient is a 34-year-old female with a past medical history including anxiety, depression, lupus, hypertension, and migraines.  Patient referred in from primary care provider today for concern of dehydration.  Patient states that she started with a sore throat and pain with swallowing about 3 days ago.  States that her symptoms progressively became worse including nasal congestion, runny nose, productive cough, and feels very fatigue.  States that she is coughing up brown mucus.  Has been experiencing intermittent fever and chills with temp max at 101.8 last night.  Attempted Mucinex this morning.  Decreased p.o. intake but is still drinking a small amount of fluids.  At PCP office, was tested for COVID/flu and strep which were negative. Had a chest x-ray done today which was negative for pneumonia. Reports some mild shortness of breath from time to time. She denies any chest pain or abdominal pain.  Denies any recent travel or known sick contacts but states that her child is now also sick.      Fever  Associated symptoms: congestion, diarrhea (hs IBS), fatigue, fever, headaches (mild), myalgias, rhinorrhea, shortness of breath and sore throat    Associated symptoms: no abdominal pain, no chest pain, no nausea and no vomiting      Past Medical History:   Diagnosis Date    Anxiety     Depression     Hypertension     Insomnia     Lupus (HCC)     Migraines          Review of Systems   Constitutional:  Positive for chills, fatigue and fever.   HENT:  Positive for congestion, rhinorrhea, sneezing and sore throat.    Respiratory:  Positive for " shortness of breath.    Cardiovascular:  Negative for chest pain and palpitations.   Gastrointestinal:  Positive for diarrhea (hs IBS). Negative for abdominal pain, nausea and vomiting.   Genitourinary:  Negative for difficulty urinating, frequency, hematuria and urgency.   Musculoskeletal:  Positive for myalgias.   Neurological:  Positive for light-headedness and headaches (mild).   All other systems reviewed and are negative.          Objective     ED Triage Vitals   Temperature Pulse Blood Pressure Respirations SpO2 Patient Position - Orthostatic VS   09/18/24 1346 09/18/24 1346 09/18/24 1346 09/18/24 1346 09/18/24 1346 09/18/24 1346   98.3 °F (36.8 °C) 101 113/68 20 99 % Sitting      Temp src Heart Rate Source BP Location FiO2 (%) Pain Score    -- 09/18/24 1346 09/18/24 1346 -- 09/18/24 1456     Monitor Left arm  7        Physical Exam  Vitals and nursing note reviewed.   Constitutional:       Appearance: Normal appearance.   HENT:      Head: Normocephalic and atraumatic.      Nose: Congestion and rhinorrhea present.      Mouth/Throat:      Mouth: Mucous membranes are moist.      Pharynx: Oropharynx is clear.   Cardiovascular:      Rate and Rhythm: Normal rate and regular rhythm.      Heart sounds: Normal heart sounds.   Pulmonary:      Effort: Pulmonary effort is normal.      Breath sounds: Normal breath sounds.   Abdominal:      General: Abdomen is flat. Bowel sounds are normal.      Palpations: Abdomen is soft.      Tenderness: There is no abdominal tenderness.   Musculoskeletal:         General: Normal range of motion.      Cervical back: Normal range of motion and neck supple.   Skin:     General: Skin is warm and dry.      Capillary Refill: Capillary refill takes less than 2 seconds.   Neurological:      General: No focal deficit present.      Mental Status: She is alert and oriented to person, place, and time.   Psychiatric:         Mood and Affect: Mood normal.         Behavior: Behavior normal.          Labs Reviewed   CBC AND DIFFERENTIAL - Abnormal       Result Value    WBC 10.50 (*)     RBC 4.93      Hemoglobin 14.6      Hematocrit 43.5      MCV 88      MCH 29.6      MCHC 33.6      RDW 12.2      MPV 10.0      Platelets 274      nRBC 0      Segmented % 73      Immature Grans % 0      Lymphocytes % 14      Monocytes % 11      Eosinophils Relative 1      Basophils Relative 1      Absolute Neutrophils 7.74 (*)     Absolute Immature Grans 0.03      Absolute Lymphocytes 1.43      Absolute Monocytes 1.10      Eosinophils Absolute 0.14      Basophils Absolute 0.06     COVID-19/INFLUENZA A/B RAPID ANTIGEN (30 MIN.TAT) - Normal    SARS COV Rapid Antigen Negative      Influenza A Rapid Antigen Negative      Influenza B Rapid Antigen Negative      Narrative:     This test has been performed using the [employer] Shyanne 2 FLU+SARS Antigen test under the Emergency Use Authorization (EUA). This test has been validated by the  and verified by the performing laboratory. The Shyanne uses lateral flow immunofluorescent sandwich assay to detect SARS-COV, Influenza A and Influenza B Antigen.     The [employer] Shyanne 2 SARS Antigen test does not differentiate between SARS-CoV and SARS-CoV-2.     Negative results are presumptive and may be confirmed with a molecular assay, if necessary, for patient management. Negative results do not rule out SARS-CoV-2 or influenza infection and should not be used as the sole basis for treatment or patient management decisions. A negative test result may occur if the level of antigen in a sample is below the limit of detection of this test.     Positive results are indicative of the presence of viral antigens, but do not rule out bacterial infection or co-infection with other viruses.     All test results should be used as an adjunct to clinical observations and other information available to the provider.    FOR PEDIATRIC PATIENTS - copy/paste COVID Guidelines URL to browser:  [email or web address]   BASIC METABOLIC PANEL    Sodium 136      Potassium 3.8      Chloride 104      CO2 25      ANION GAP 7      BUN 6      Creatinine 0.93      Glucose 86      Calcium 9.1      eGFR 79      Narrative:     National Kidney Disease Foundation guidelines for Chronic Kidney Disease (CKD):     Stage 1 with normal or high GFR (GFR > 90 mL/min/1.73 square meters)    Stage 2 Mild CKD (GFR = 60-89 mL/min/1.73 square meters)    Stage 3A Moderate CKD (GFR = 45-59 mL/min/1.73 square meters)    Stage 3B Moderate CKD (GFR = 30-44 mL/min/1.73 square meters)    Stage 4 Severe CKD (GFR = 15-29 mL/min/1.73 square meters)    Stage 5 End Stage CKD (GFR <15 mL/min/1.73 square meters)  Note: GFR calculation is accurate only with a steady state creatinine   POCT PREGNANCY, URINE     No orders to display       ECG 12 Lead Documentation Only    Date/Time: 9/18/2024 3:05 PM    Performed by: SHANNON Recinos  Authorized by: SHANNON Recinos    Indications / Diagnosis:  Shortness of breath  ECG reviewed by me, the ED Provider: yes    Patient location:  ED  Previous ECG:     Previous ECG:  Compared to current    Comparison ECG info:  NSR, HR=77    Similarity:  No change  Interpretation:     Interpretation: normal    Rate:     ECG rate:  NSR, HR=88    ECG rate assessment: normal    Rhythm:     Rhythm: sinus rhythm    Ectopy:     Ectopy: none    QRS:     QRS axis:  Normal    QRS intervals:  Normal  Conduction:     Conduction: normal    ST segments:     ST segments:  Normal  T waves:     T waves: normal           SHANNON Recinos  09/18/24 1475

## 2024-09-18 NOTE — ASSESSMENT & PLAN NOTE
- follows with rheumatology   - currently on plaquenil 200 mg QD, gabapentin 300 mg BID, and benlysta

## 2024-09-18 NOTE — TELEPHONE ENCOUNTER
Sydney Riggs PA-C  9/18/2024  1:48 PM EDT Back to Top      CXR clear; no evidence of pneumonia.

## 2024-09-18 NOTE — DISCHARGE INSTRUCTIONS
-continue to use medications prescribed by PCP  -use Flonase once daily for congestion  -may use allergy medication if needed  -increase fluids and rest    Follow-up with PCP and return to ED for any worsening symptoms.

## 2024-09-25 ENCOUNTER — DOCUMENTATION (OUTPATIENT)
Dept: HEMATOLOGY ONCOLOGY | Facility: CLINIC | Age: 34
End: 2024-09-25

## 2024-09-25 NOTE — PROGRESS NOTES
Chart was clinically reviewed by ONN and at this time the referral to hematology-oncology will be closed, as there is limited work-up for the specified referral diagnosis. Please place an Amb e-consult to hematology if felt [id number] communication will assist with recommendations for additional testing/work-up and notify the patient.  The e-consult will be completed within 7 business days.  Thank you!

## 2024-09-25 NOTE — PROGRESS NOTES
Please let patient know hematology will not see her for the vitamin D issue. Would recommend seeing nutrition.

## 2024-10-02 ENCOUNTER — CLINICAL SUPPORT (OUTPATIENT)
Dept: NUTRITION | Facility: HOSPITAL | Age: 34
End: 2024-10-02
Payer: COMMERCIAL

## 2024-10-02 VITALS — WEIGHT: 217.4 LBS | BODY MASS INDEX: 35.09 KG/M2

## 2024-10-02 DIAGNOSIS — T45.2X5A: ICD-10-CM

## 2024-10-02 PROCEDURE — 97802 MEDICAL NUTRITION INDIV IN: CPT

## 2024-10-02 NOTE — PROGRESS NOTES
" Nutrition Assessment Form    Patient Name: Jia Armstrong    YOB: 1990    Sex: Female     Assessment Date: 10/2/2024  Start Time: 1 pm Stop Time: 2 pm Total Minutes: 60     Data:  Present at session: self   Parent/Patient Concerns/reason for visit: \"Vitamin A toxicity and no one can figure out why\"   Medical Dx/Reason for Referral: T45.2X5A (ICD-10-CM) - Adverse effect of vitamin A    Past Medical History:   Diagnosis Date    Anxiety     Depression     Hypertension     Insomnia     Lupus (HCC)     Migraines        Current Outpatient Medications   Medication Sig Dispense Refill    albuterol (Proventil HFA) 90 mcg/act inhaler Inhale 2 puffs every 6 (six) hours as needed for wheezing or shortness of breath 6.7 g 0    ALPRAZolam (XANAX) 0.5 mg tablet Take 0.25 mg by mouth if needed      Ascorbic Acid (vitamin C) 1000 MG tablet Take 1,000 mg by mouth daily      Atogepant (Qulipta) 60 MG TABS Take 60 mg by mouth daily 90 tablet 0    Benlysta 200 MG/ML SOAJ 200 mg Once a week      buPROPion (WELLBUTRIN XL) 300 mg 24 hr tablet Take 300 mg by mouth daily Tahe 450 mg daily      busPIRone (BUSPAR) 10 mg tablet Take 20 mg by mouth 2 (two) times a day      cetirizine (ZyrTEC) 10 mg tablet Take 1 tablet (10 mg total) by mouth daily 30 tablet 0    Cholecalciferol (Vitamin D) 50 MCG (2000 UT) tablet Take 2,000 Units by mouth daily      clindamycin-benzoyl peroxide (BENZACLIN) gel Apply topically 2 (two) times a day 25 g 0    clonazePAM (KlonoPIN) 0.5 mg tablet Take 0.5 mg by mouth 3 (three) times a day      Diclofenac Sodium (VOLTAREN) 1 % Apply 2 g topically 4 (four) times a day      DULoxetine (CYMBALTA) 30 mg delayed release capsule Take 30 mg by mouth 2 (two) times a day      escitalopram (LEXAPRO) 20 mg tablet Take 20 mg by mouth daily (Patient not taking: Reported on 9/18/2024)      escitalopram (LEXAPRO) 5 mg tablet Take 5 mg by mouth daily (Patient not taking: Reported on 9/18/2024)      esomeprazole (NexIUM) " "40 MG capsule Take 1 capsule (40 mg total) by mouth 2 (two) times a day before meals 180 capsule 0    fluticasone (FLONASE) 50 mcg/act nasal spray 1 spray into each nostril daily 16 g 0    gabapentin (NEURONTIN) 300 mg capsule Take 300 mg by mouth 2 (two) times a day Take 2 caps BID      hydroxychloroquine (PLAQUENIL) 200 mg tablet Take 200 mg by mouth 2 (two) times a day with meals      lisinopril (ZESTRIL) 5 mg tablet Take 1 tablet (5 mg total) by mouth daily 90 tablet 0    Melatonin 3 MG/4ML LIQD Take by mouth      methylPREDNISolone 4 MG tablet therapy pack Use as directed on package 21 each 0    Norethin-Eth Estrad-Fe Biphas 1 MG-10 MCG / 10 MCG TABS Take 1 tablet by mouth in the morning 90 tablet 4    saccharomyces boulardii (FLORASTOR) 250 mg capsule Take 250 mg by mouth 2 (two) times a day      Ubrelvy 100 MG tablet Take 1 tablet (100 mg total) by mouth once as needed (migraines) for up to 1 dose 9 tablet 0    vitamin B-12 (VITAMIN B-12) 1,000 mcg tablet Take by mouth daily       No current facility-administered medications for this visit.        Additional Meds/Supplements:    Special Learning Needs/barriers to learning/any new barriers    Height: Ht Readings from Last 5 Encounters:   09/18/24 5' 6\" (1.676 m)   07/24/24 5' 6\" (1.676 m)   07/19/24 5' 6\" (1.676 m)      Weight: Wt Readings from Last 10 Encounters:   09/18/24 100 kg (220 lb 12.8 oz)   07/24/24 103 kg (228 lb)   07/19/24 105 kg (231 lb)     Estimated body mass index is 35.64 kg/m² as calculated from the following:    Height as of 9/18/24: 5' 6\" (1.676 m).    Weight as of 9/18/24: 100 kg (220 lb 12.8 oz).   Recent Weight Change: [x]Yes     []No  Amount:       Energy Needs: No calculation needed   Allergies   Allergen Reactions    Bee Venom Anaphylaxis and Hives    Amoxicillin Hives    Topamax [Topiramate] Delirium    Onion - Food Allergy Headache     Per RN, raw onions are a headache trigger    Per RN, raw onions are a headache trigger   Other " "reaction(s): Headache   Per RN, raw onions are no longer a headache trigger    or intolerances    Social History     Substance and Sexual Activity   Alcohol Use Not Currently    ETOH 1 X per month ~2 oz  whiskey on the rocks    Social History     Tobacco Use   Smoking Status Never    Passive exposure: Never   Smokeless Tobacco Never       Who shops? patient   Who cooks/cooking methods/Eating out/take out habits   Self and spouse   Cooking methods: bake/culp/air culp/grill/boil/other________    Take out: __2_ x/month fast food [employer] 1/4 pounder with fries   Dining out _2-3___ x/ month, pizza, ribs, steak with mashed potatoes, blooming onion,vegetable    Exercise: Home improvement inside and outside new home      Other: ie: Sleep habits/ stress level/ work habits household-lives with ?/ food security Difficulty falling sleep- \"always had insomnia\"  Wake at 7, back to sleep at 8:30 sleeps until 11, still does not feel rested   Goes to bed ~12am    Prior Nutritional Counseling? []Yes     [x]No  When:      Why:         Diet Hx:  Breakfast: 11 am- Atkins chocolate shake   Rare cereal with daughter- cinnamon toast crunch  Diet:   Lunch:   Coffee with stevia and half and half   8 bottles water a day        Dinner: 6 pm chicken thighs/ breast, steak, kielbasa with potatoes peppers, onions, green beans, broccoli, mushrooms           Snacks: AM -   PM - \"picks through the day\" grapes, nutrigrain bar   HS -    Other Notes/ Initial Assessment:  Does not use retinol, does not take vitamin A supplements   Lost 12 lb in the last 10 days unintentional, lost 43 lb since February. Has experienced lack of appetite after stomach bug in February and low appetite since then.   Stated BM's range from diarrhea to constipation and has been a lifelong occurrence     Patient noted she used to use a \"new food\" chart with her daughter to encourage variety and feels she will use it again to add healthy foods     RD reviewed sources of vitamin " A, noted patient does not have excessive intake of vitamin A   RD discussed benefits of following an anti-inflammatory pattern of eating for autoimmune disease and overall health.  Discussed components: variety of types and colors of fruits and vegetables, unsaturated fats (olive oil/ avocado, nuts) Omega 3's from salmon/ flax/ walnuts, whole grains  Encouraged minimal added sugars/ refined grains/ saturated fats  Discussed benefits if regular activity for energy level. Encouraged starting with short moments of activity to find best time of day to make a scheduled plan for activity     Patient will follow up PRN    Updated assessment (Follow up note only):     Nutrition Diagnosis:   Food and nutrition related knowledge deficit  related to Lack of prior exposure to accurate nutrition related information as evidenced by No prior knowledge of need for food and nutrition related recommendations       Any change or new dx since previous visit:     Nutrition Diagnosis:   N/a      Medical Nutrition Therapy Intervention:  []Individualized Meal Plan []Understanding Lab Values   []Basic Pathophysiology of Disease []Food/Medication Interactions   []Food Diary [x]Exercise increased energy level with regular activity   []Lifestyle/Behavior Modification Techniques []Medication, Mechanism of Action   []Label Reading: CHO/ Na/ Fat/ other_________ []Self Blood Glucose Monitoring   []Weight/BMI Goals: gain/lose/maintain [x]Other - anti-inflammatory pattern of eating           Comprehension: []Excellent  []Very Good  [x]Good  []Fair   []Poor    Receptivity: []Excellent  []Very Good  [x]Good  []Fair   []Poor    Expected Compliance: []Excellent  []Very Good  [x]Good  []Fair   []Poor        Goals (initial)/ Progress made on previous goals/new goals:  1.Patient will incorporate 1 new food a week   2.Patient will follow anti-inflammatory pattern of eating    3.       No follow-ups on file.  Labs:  CMP  Lab Results   Component Value Date     "K 3.8 09/18/2024     09/18/2024    CO2 25 09/18/2024    BUN 6 09/18/2024    CREATININE 0.93 09/18/2024    CALCIUM 9.1 09/18/2024    AST 14 08/19/2024    ALT 10 08/19/2024    ALKPHOS 60 01/25/2024    EGFR 79 09/18/2024       BMP  Lab Results   Component Value Date    CALCIUM 9.1 09/18/2024    K 3.8 09/18/2024    CO2 25 09/18/2024     09/18/2024    BUN 6 09/18/2024    CREATININE 0.93 09/18/2024       Lipids  No results found for: \"CHOL\"  Lab Results   Component Value Date    HDL 51 08/19/2024     Lab Results   Component Value Date    LDLCALC 109 (H) 08/19/2024     Lab Results   Component Value Date    TRIG 155 (H) 08/19/2024     No results found for: \"CHOLHDL\"    Hemoglobin A1C  No results found for: \"HGBA1C\"    Fasting Glucose  No results found for: \"GLUF\"    Insulin     Thyroid  Lab Results   Component Value Date    TSH 0.943 08/19/2024       Hepatic Function Panel  Lab Results   Component Value Date    ALT 10 08/19/2024    AST 14 08/19/2024    ALKPHOS 60 01/25/2024       Celiac Disease Antibody Panel  No results found for: \"ENDOMYSIAL IGA\", \"GLIADIN IGA\", \"GLIADIN IGG\", \"IGA\", \"TISSUE TRANSGLUT AB\", \"TTG IGA\"   Iron  No results found for: \"IRON\", \"TIBC\", \"FERRITIN\"         Jeaneth Sotelo RD  [facility]  [address] [place] [id number]    "

## 2024-10-04 ENCOUNTER — OFFICE VISIT (OUTPATIENT)
Dept: FAMILY MEDICINE CLINIC | Facility: CLINIC | Age: 34
End: 2024-10-04
Payer: COMMERCIAL

## 2024-10-04 VITALS
WEIGHT: 220 LBS | TEMPERATURE: 97.7 F | OXYGEN SATURATION: 98 % | SYSTOLIC BLOOD PRESSURE: 132 MMHG | DIASTOLIC BLOOD PRESSURE: 88 MMHG | HEIGHT: 66 IN | HEART RATE: 81 BPM | BODY MASS INDEX: 35.36 KG/M2

## 2024-10-04 DIAGNOSIS — R11.0 NAUSEA: ICD-10-CM

## 2024-10-04 DIAGNOSIS — E55.9 VITAMIN D DEFICIENCY: ICD-10-CM

## 2024-10-04 DIAGNOSIS — R63.4 WEIGHT LOSS, UNINTENTIONAL: Primary | ICD-10-CM

## 2024-10-04 DIAGNOSIS — Z00.6 ENCOUNTER FOR EXAMINATION FOR NORMAL COMPARISON OR CONTROL IN CLINICAL RESEARCH PROGRAM: ICD-10-CM

## 2024-10-04 DIAGNOSIS — T45.2X5A: ICD-10-CM

## 2024-10-04 DIAGNOSIS — Z80.0 FAMILY HISTORY OF COLON CANCER: ICD-10-CM

## 2024-10-04 DIAGNOSIS — R19.8 ALTERNATING CONSTIPATION AND DIARRHEA: ICD-10-CM

## 2024-10-04 DIAGNOSIS — E53.8 VITAMIN B12 DEFICIENCY: ICD-10-CM

## 2024-10-04 DIAGNOSIS — R23.2 HOT FLASHES: ICD-10-CM

## 2024-10-04 DIAGNOSIS — Z13.1 SCREENING FOR DIABETES MELLITUS: ICD-10-CM

## 2024-10-04 LAB — HBA1C MFR BLD HPLC: 5.6 %

## 2024-10-04 PROCEDURE — 99214 OFFICE O/P EST MOD 30 MIN: CPT

## 2024-10-04 NOTE — PROGRESS NOTES
Ambulatory Visit  Name: Jia Armstrong      : 1990      MRN: [id number]  Encounter Provider: Sydney Riggs PA-C  Encounter Date: 10/4/2024   Encounter department: [facility]    Assessment & Plan  Weight loss, unintentional  - 8 lb weight loss since patient first established with me three months ago; reports 40 lb weight loss within the last year   - suspect related to decreased appetite and chronic nausea -- ordered labs as below for further evaluation and to rule out underlying cause  - patient does admit to baseline alternating constipation and diarrhea with a fhx of colon cancer -- therefore placed a referral to GI as she may need updated EGD/colonoscopy with the recent weight loss   - if labs are unremarkable; consider CT of chest/abdomen/pelvis for further evaluation   - will make further treatment recommendation once labs are received     Orders:    CBC and differential; Future    Iron Panel (Includes Ferritin, Iron Sat%, Iron, and TIBC); Future    Comprehensive metabolic panel; Future    Hemoglobin A1C; Future    Cortisol; Future    Screening for diabetes mellitus    Orders:    Hemoglobin A1C; Future    Nausea  - see plan for weight loss above    Orders:    Ambulatory Referral to Gastroenterology; Future    Hot flashes  - check hormones below to rule out early menopause as cause of symptoms, denies any chance of pregnancy  - did advise insurance may not cover testing and to check with them first    Orders:    Progesterone; Future    Estradiol; Future    Luteinizing hormone; Future    Follicle stimulating hormone; Future    Adverse effect of vitamin A  - chronically elevated vitamin D  - saw nutrition who ruled out a dietary cause  - is scheduled to see endocrinology for further evaluation     Orders:    Vitamin A; Future    Family history of colon cancer    Orders:    Ambulatory Referral to Gastroenterology; Future    Alternating constipation and diarrhea  - referral to GI  placed    Orders:    Ambulatory Referral to Gastroenterology; Future    Vitamin D deficiency  - check vitamin D    Orders:    Vitamin D 25 hydroxy; Future    Vitamin B12 deficiency  - check vitamin B12    Orders:    Vitamin B12; Future       History of Present Illness     CC: unintentional weight loss    Patient presents for unintentional weight loss -- patient established in 7/2024 and weight was 228 lbs, weight today is 220 lbs however pt reports since last Feb she has lost 40 lbs which concerns her. She does admit to decreased appetite and has not been eating as much.   She has been experiencing ongoing nausea for months as well.   She has had a consistent elevated vitamin A level - she saw nutrition earlier this week who ruled out a dietary cause. She is scheduled to see endo for further evaluation.   She reports a family hx of colon cancer. She has had a colonoscopy/EGD about four years ago. Does experience intermittent C/D however this is her baseline.   Also reports hot flashes.         History obtained from : patient  Review of Systems   Constitutional:  Positive for diaphoresis and unexpected weight change. Negative for chills and fever.   Respiratory:  Negative for chest tightness and shortness of breath.    Cardiovascular:  Negative for chest pain and palpitations.   Gastrointestinal:  Positive for constipation, diarrhea and nausea.     Medical History Reviewed by provider this encounter:  Tobacco  Allergies  Meds  Problems  Med Hx  Surg Hx  Fam Hx       Past Medical History   Past Medical History:   Diagnosis Date    Anxiety     Depression     Hypertension     Insomnia     Lupus     Migraines      Past Surgical History:   Procedure Laterality Date    REDUCTION MAMMAPLASTY  2015     Family History   Problem Relation Age of Onset    Breast cancer Mother     Colon cancer Father     Colon cancer Paternal Uncle      Current Outpatient Medications on File Prior to Visit   Medication Sig Dispense Refill     albuterol (Proventil HFA) 90 mcg/act inhaler Inhale 2 puffs every 6 (six) hours as needed for wheezing or shortness of breath 6.7 g 0    ALPRAZolam (XANAX) 0.5 mg tablet Take 0.25 mg by mouth if needed      Ascorbic Acid (vitamin C) 1000 MG tablet Take 1,000 mg by mouth daily      Atogepant (Qulipta) 60 MG TABS Take 60 mg by mouth daily 90 tablet 0    Benlysta 200 MG/ML SOAJ 200 mg Once a week      buPROPion (WELLBUTRIN XL) 300 mg 24 hr tablet Take 300 mg by mouth daily Tahe 450 mg daily      busPIRone (BUSPAR) 10 mg tablet Take 20 mg by mouth 2 (two) times a day      cetirizine (ZyrTEC) 10 mg tablet Take 1 tablet (10 mg total) by mouth daily 30 tablet 0    Cholecalciferol (Vitamin D) 50 MCG (2000 UT) tablet Take 2,000 Units by mouth daily      clindamycin-benzoyl peroxide (BENZACLIN) gel Apply topically 2 (two) times a day 25 g 0    clonazePAM (KlonoPIN) 0.5 mg tablet Take 0.5 mg by mouth 3 (three) times a day      Diclofenac Sodium (VOLTAREN) 1 % Apply 2 g topically 4 (four) times a day      DULoxetine (CYMBALTA) 30 mg delayed release capsule Take 30 mg by mouth 2 (two) times a day      esomeprazole (NexIUM) 40 MG capsule Take 1 capsule (40 mg total) by mouth 2 (two) times a day before meals 180 capsule 0    fluticasone (FLONASE) 50 mcg/act nasal spray 1 spray into each nostril daily 16 g 0    gabapentin (NEURONTIN) 300 mg capsule Take 300 mg by mouth 2 (two) times a day Take 2 caps BID      hydroxychloroquine (PLAQUENIL) 200 mg tablet Take 200 mg by mouth 2 (two) times a day with meals      lisinopril (ZESTRIL) 5 mg tablet Take 1 tablet (5 mg total) by mouth daily 90 tablet 0    Melatonin 3 MG/4ML LIQD Take by mouth      methylPREDNISolone 4 MG tablet therapy pack Use as directed on package 21 each 0    Norethin-Eth Estrad-Fe Biphas 1 MG-10 MCG / 10 MCG TABS Take 1 tablet by mouth in the morning 90 tablet 4    saccharomyces boulardii (FLORASTOR) 250 mg capsule Take 250 mg by mouth 2 (two) times a day       Ubrelvy 100 MG tablet Take 1 tablet (100 mg total) by mouth once as needed (migraines) for up to 1 dose 9 tablet 0    vitamin B-12 (VITAMIN B-12) 1,000 mcg tablet Take by mouth daily      [DISCONTINUED] escitalopram (LEXAPRO) 20 mg tablet Take 20 mg by mouth daily (Patient not taking: Reported on 9/18/2024)      [DISCONTINUED] escitalopram (LEXAPRO) 5 mg tablet Take 5 mg by mouth daily (Patient not taking: Reported on 9/18/2024)       No current facility-administered medications on file prior to visit.     Allergies   Allergen Reactions    Bee Venom Anaphylaxis and Hives    Amoxicillin Hives    Topamax [Topiramate] Delirium    Onion - Food Allergy Headache     Per RN, raw onions are a headache trigger    Per RN, raw onions are a headache trigger   Other reaction(s): Headache   Per RN, raw onions are no longer a headache trigger      Current Outpatient Medications on File Prior to Visit   Medication Sig Dispense Refill    albuterol (Proventil HFA) 90 mcg/act inhaler Inhale 2 puffs every 6 (six) hours as needed for wheezing or shortness of breath 6.7 g 0    ALPRAZolam (XANAX) 0.5 mg tablet Take 0.25 mg by mouth if needed      Ascorbic Acid (vitamin C) 1000 MG tablet Take 1,000 mg by mouth daily      Atogepant (Qulipta) 60 MG TABS Take 60 mg by mouth daily 90 tablet 0    Benlysta 200 MG/ML SOAJ 200 mg Once a week      buPROPion (WELLBUTRIN XL) 300 mg 24 hr tablet Take 300 mg by mouth daily Tahe 450 mg daily      busPIRone (BUSPAR) 10 mg tablet Take 20 mg by mouth 2 (two) times a day      cetirizine (ZyrTEC) 10 mg tablet Take 1 tablet (10 mg total) by mouth daily 30 tablet 0    Cholecalciferol (Vitamin D) 50 MCG (2000 UT) tablet Take 2,000 Units by mouth daily      clindamycin-benzoyl peroxide (BENZACLIN) gel Apply topically 2 (two) times a day 25 g 0    clonazePAM (KlonoPIN) 0.5 mg tablet Take 0.5 mg by mouth 3 (three) times a day      Diclofenac Sodium (VOLTAREN) 1 % Apply 2 g topically 4 (four) times a day       "DULoxetine (CYMBALTA) 30 mg delayed release capsule Take 30 mg by mouth 2 (two) times a day      esomeprazole (NexIUM) 40 MG capsule Take 1 capsule (40 mg total) by mouth 2 (two) times a day before meals 180 capsule 0    fluticasone (FLONASE) 50 mcg/act nasal spray 1 spray into each nostril daily 16 g 0    gabapentin (NEURONTIN) 300 mg capsule Take 300 mg by mouth 2 (two) times a day Take 2 caps BID      hydroxychloroquine (PLAQUENIL) 200 mg tablet Take 200 mg by mouth 2 (two) times a day with meals      lisinopril (ZESTRIL) 5 mg tablet Take 1 tablet (5 mg total) by mouth daily 90 tablet 0    Melatonin 3 MG/4ML LIQD Take by mouth      methylPREDNISolone 4 MG tablet therapy pack Use as directed on package 21 each 0    Norethin-Eth Estrad-Fe Biphas 1 MG-10 MCG / 10 MCG TABS Take 1 tablet by mouth in the morning 90 tablet 4    saccharomyces boulardii (FLORASTOR) 250 mg capsule Take 250 mg by mouth 2 (two) times a day      Ubrelvy 100 MG tablet Take 1 tablet (100 mg total) by mouth once as needed (migraines) for up to 1 dose 9 tablet 0    vitamin B-12 (VITAMIN B-12) 1,000 mcg tablet Take by mouth daily      [DISCONTINUED] escitalopram (LEXAPRO) 20 mg tablet Take 20 mg by mouth daily (Patient not taking: Reported on 9/18/2024)      [DISCONTINUED] escitalopram (LEXAPRO) 5 mg tablet Take 5 mg by mouth daily (Patient not taking: Reported on 9/18/2024)       No current facility-administered medications on file prior to visit.      Social History     Tobacco Use    Smoking status: Never     Passive exposure: Never    Smokeless tobacco: Never   Vaping Use    Vaping status: Never Used   Substance and Sexual Activity    Alcohol use: Not Currently    Drug use: Yes     Types: Marijuana    Sexual activity: Yes         Objective     /88 (BP Location: Left arm, Patient Position: Sitting, Cuff Size: Large)   Pulse 81   Temp 97.7 °F (36.5 °C)   Ht 5' 6\" (1.676 m)   Wt 99.8 kg (220 lb)   SpO2 98%   BMI 35.51 kg/m² "     Physical Exam  Vitals reviewed.   Constitutional:       General: She is not in acute distress.     Appearance: Normal appearance. She is not ill-appearing or diaphoretic.   HENT:      Head: Normocephalic and atraumatic.      Right Ear: External ear normal.      Left Ear: External ear normal.      Nose: Nose normal.      Mouth/Throat:      Mouth: Mucous membranes are moist.   Eyes:      General:         Right eye: No discharge.         Left eye: No discharge.      Conjunctiva/sclera: Conjunctivae normal.   Cardiovascular:      Rate and Rhythm: Normal rate and regular rhythm.      Pulses: Normal pulses.      Heart sounds: Normal heart sounds. No murmur heard.  Pulmonary:      Effort: Pulmonary effort is normal. No respiratory distress.      Breath sounds: Normal breath sounds. No wheezing, rhonchi or rales.   Abdominal:      General: Bowel sounds are normal. There is no distension.      Palpations: Abdomen is soft.      Tenderness: There is no abdominal tenderness.   Musculoskeletal:         General: Normal range of motion.      Cervical back: Normal range of motion.      Right lower leg: No edema.      Left lower leg: No edema.   Skin:     General: Skin is warm.   Neurological:      General: No focal deficit present.      Mental Status: She is alert.      Gait: Gait normal.   Psychiatric:         Mood and Affect: Mood normal.

## 2024-10-07 ENCOUNTER — TELEPHONE (OUTPATIENT)
Dept: FAMILY MEDICINE CLINIC | Facility: CLINIC | Age: 34
End: 2024-10-07

## 2024-10-07 NOTE — TELEPHONE ENCOUNTER
----- Message from Sydney Riggs PA-C sent at 10/7/2024  1:58 PM EDT -----  RBC and WBC normal   Kidney and liver function are normal   A1C is normal; no concern for diabetes  Cortisol level normal     Still waiting on female hormone testing, vitamin levels, and iron panel

## 2024-10-07 NOTE — TELEPHONE ENCOUNTER
I read doctor's note, verbatim.  Patient understood and had no further questions. Will await the remaining results.

## 2024-10-10 DIAGNOSIS — E86.0 DEHYDRATION: ICD-10-CM

## 2024-10-10 DIAGNOSIS — R42 LIGHTHEADEDNESS: ICD-10-CM

## 2024-10-10 RX ORDER — ALBUTEROL SULFATE 90 UG/1
INHALANT RESPIRATORY (INHALATION)
Qty: 6.7 G | Refills: 5 | Status: SHIPPED | OUTPATIENT
Start: 2024-10-10

## 2024-10-12 LAB
25(OH)D3+25(OH)D2 SERPL-MCNC: 51.3 NG/ML (ref 30–100)
ALBUMIN SERPL-MCNC: 4.1 G/DL (ref 3.9–4.9)
ALP SERPL-CCNC: 66 IU/L (ref 44–121)
ALT SERPL-CCNC: 11 IU/L (ref 0–32)
AST SERPL-CCNC: 14 IU/L (ref 0–40)
BASOPHILS # BLD AUTO: 0.1 X10E3/UL (ref 0–0.2)
BASOPHILS NFR BLD AUTO: 1 %
BILIRUB SERPL-MCNC: 0.3 MG/DL (ref 0–1.2)
BUN SERPL-MCNC: 9 MG/DL (ref 6–20)
BUN/CREAT SERPL: 11 (ref 9–23)
CALCIUM SERPL-MCNC: 9.3 MG/DL (ref 8.7–10.2)
CHLORIDE SERPL-SCNC: 105 MMOL/L (ref 96–106)
CO2 SERPL-SCNC: 20 MMOL/L (ref 20–29)
CORTIS SERPL-MCNC: 9.4 UG/DL (ref 6.2–19.4)
CREAT SERPL-MCNC: 0.83 MG/DL (ref 0.57–1)
EGFR: 94 ML/MIN/1.73
EOSINOPHIL # BLD AUTO: 0.2 X10E3/UL (ref 0–0.4)
EOSINOPHIL NFR BLD AUTO: 3 %
ERYTHROCYTE [DISTWIDTH] IN BLOOD BY AUTOMATED COUNT: 12 % (ref 11.7–15.4)
ESTRADIOL SERPL-MCNC: 22.1 PG/ML
FERRITIN SERPL-MCNC: 59 NG/ML (ref 15–150)
FSH SERPL-ACNC: 7 MIU/ML
GLOBULIN SER-MCNC: 2 G/DL (ref 1.5–4.5)
GLUCOSE SERPL-MCNC: 92 MG/DL (ref 70–99)
HBA1C MFR BLD: 5.6 % (ref 4.8–5.6)
HCT VFR BLD AUTO: 42 % (ref 34–46.6)
HGB BLD-MCNC: 14.2 G/DL (ref 11.1–15.9)
IMM GRANULOCYTES # BLD: 0 X10E3/UL (ref 0–0.1)
IMM GRANULOCYTES NFR BLD: 0 %
IRON SATN MFR SERPL: 18 % (ref 15–55)
IRON SERPL-MCNC: 60 UG/DL (ref 27–159)
LH SERPL-ACNC: 6.5 MIU/ML
LYMPHOCYTES # BLD AUTO: 1.9 X10E3/UL (ref 0.7–3.1)
LYMPHOCYTES NFR BLD AUTO: 28 %
MCH RBC QN AUTO: 30.4 PG (ref 26.6–33)
MCHC RBC AUTO-ENTMCNC: 33.8 G/DL (ref 31.5–35.7)
MCV RBC AUTO: 90 FL (ref 79–97)
MONOCYTES # BLD AUTO: 0.6 X10E3/UL (ref 0.1–0.9)
MONOCYTES NFR BLD AUTO: 8 %
NEUTROPHILS # BLD AUTO: 4.2 X10E3/UL (ref 1.4–7)
NEUTROPHILS NFR BLD AUTO: 60 %
PLATELET # BLD AUTO: 314 X10E3/UL (ref 150–450)
POTASSIUM SERPL-SCNC: 4.6 MMOL/L (ref 3.5–5.2)
PROGEST SERPL-MCNC: <0.1 NG/ML
PROT SERPL-MCNC: 6.1 G/DL (ref 6–8.5)
RBC # BLD AUTO: 4.67 X10E6/UL (ref 3.77–5.28)
SODIUM SERPL-SCNC: 140 MMOL/L (ref 134–144)
TIBC SERPL-MCNC: 333 UG/DL (ref 250–450)
UIBC SERPL-MCNC: 273 UG/DL (ref 131–425)
VIT A SERPL-MCNC: 78.6 UG/DL (ref 18.9–57.3)
VIT B12 SERPL-MCNC: 677 PG/ML (ref 232–1245)
WBC # BLD AUTO: 7 X10E3/UL (ref 3.4–10.8)

## 2024-10-14 ENCOUNTER — TELEPHONE (OUTPATIENT)
Dept: FAMILY MEDICINE CLINIC | Facility: CLINIC | Age: 34
End: 2024-10-14

## 2024-10-14 NOTE — TELEPHONE ENCOUNTER
----- Message from Sydney Riggs PA-C sent at 10/14/2024  7:39 AM EDT -----  Vitamin A continues to be consistently elevated; patient has already saw nutrition who ruled out dietary excess. She has an upcoming apt with endocrinology for further eval -- we can also consider a consult with nephrology to see what they think, I c  an place a referral if she is interested.     Otherwise labs are unremarkable. With her weight loss and night sweats (with hormone levels not convincing for menopause) I would recommend a CT of her chest/abdomen/pelvis for further evaluation and to rule out any abnormalities as cause of her weig  ht loss and sweats. If she is interested I can order.

## 2024-10-15 DIAGNOSIS — K21.9 GASTROESOPHAGEAL REFLUX DISEASE WITHOUT ESOPHAGITIS: ICD-10-CM

## 2024-10-16 RX ORDER — ESOMEPRAZOLE MAGNESIUM 40 MG/1
CAPSULE, DELAYED RELEASE ORAL
Qty: 180 CAPSULE | Refills: 1 | Status: SHIPPED | OUTPATIENT
Start: 2024-10-16

## 2024-11-07 ENCOUNTER — TELEPHONE (OUTPATIENT)
Age: 34
End: 2024-11-07

## 2024-11-07 DIAGNOSIS — R63.4 WEIGHT LOSS, UNINTENTIONAL: Primary | ICD-10-CM

## 2024-11-07 DIAGNOSIS — R79.89 HIGH SERUM VITAMIN A: ICD-10-CM

## 2024-11-07 DIAGNOSIS — R11.0 NAUSEA: ICD-10-CM

## 2024-11-07 DIAGNOSIS — Z91.030 BEE STING ALLERGY: Primary | ICD-10-CM

## 2024-11-07 RX ORDER — EPINEPHRINE 0.3 MG/.3ML
0.3 INJECTION SUBCUTANEOUS ONCE
Qty: 0.6 ML | Refills: 0 | Status: SHIPPED | OUTPATIENT
Start: 2024-11-07 | End: 2024-11-07

## 2024-11-07 NOTE — TELEPHONE ENCOUNTER
PT called asking if Sydney Keely would be willing to order her a new EPI Pen prescription. Did not see any active meds on her med list. Please advise.

## 2024-11-13 DIAGNOSIS — I10 PRIMARY HYPERTENSION: ICD-10-CM

## 2024-11-13 RX ORDER — LISINOPRIL 5 MG/1
5 TABLET ORAL DAILY
Qty: 90 TABLET | Refills: 0 | Status: SHIPPED | OUTPATIENT
Start: 2024-11-13

## 2024-11-16 ENCOUNTER — HOSPITAL ENCOUNTER (EMERGENCY)
Facility: HOSPITAL | Age: 34
Discharge: HOME | End: 2024-11-16
Attending: EMERGENCY MEDICINE
Payer: COMMERCIAL

## 2024-11-16 VITALS
HEART RATE: 74 BPM | TEMPERATURE: 98 F | OXYGEN SATURATION: 96 % | DIASTOLIC BLOOD PRESSURE: 63 MMHG | SYSTOLIC BLOOD PRESSURE: 126 MMHG | RESPIRATION RATE: 20 BRPM

## 2024-11-16 DIAGNOSIS — M54.9 MUSCULOSKELETAL BACK PAIN: ICD-10-CM

## 2024-11-16 DIAGNOSIS — M54.50 ACUTE LOW BACK PAIN WITHOUT SCIATICA, UNSPECIFIED BACK PAIN LATERALITY: Primary | ICD-10-CM

## 2024-11-16 PROCEDURE — 99284 EMERGENCY DEPT VISIT MOD MDM: CPT | Performed by: EMERGENCY MEDICINE

## 2024-11-16 PROCEDURE — 99282 EMERGENCY DEPT VISIT SF MDM: CPT

## 2024-11-16 PROCEDURE — 96372 THER/PROPH/DIAG INJ SC/IM: CPT

## 2024-11-16 RX ORDER — KETOROLAC TROMETHAMINE 30 MG/ML
30 INJECTION, SOLUTION INTRAMUSCULAR; INTRAVENOUS ONCE
Status: COMPLETED | OUTPATIENT
Start: 2024-11-16 | End: 2024-11-16

## 2024-11-16 RX ORDER — DIAZEPAM 5 MG/1
5 TABLET ORAL ONCE
Status: COMPLETED | OUTPATIENT
Start: 2024-11-16 | End: 2024-11-16

## 2024-11-16 RX ORDER — LIDOCAINE 50 MG/G
1 PATCH TOPICAL ONCE
Status: DISCONTINUED | OUTPATIENT
Start: 2024-11-16 | End: 2024-11-17 | Stop reason: HOSPADM

## 2024-11-16 RX ORDER — LIDOCAINE 50 MG/G
1 PATCH TOPICAL DAILY
Qty: 30 PATCH | Refills: 0 | Status: SHIPPED | OUTPATIENT
Start: 2024-11-16

## 2024-11-16 RX ORDER — HYDROCODONE BITARTRATE AND ACETAMINOPHEN 5; 325 MG/1; MG/1
1 TABLET ORAL ONCE
Refills: 0 | Status: COMPLETED | OUTPATIENT
Start: 2024-11-16 | End: 2024-11-16

## 2024-11-16 RX ORDER — NAPROXEN 500 MG/1
500 TABLET ORAL 2 TIMES DAILY WITH MEALS
Qty: 30 TABLET | Refills: 0 | Status: SHIPPED | OUTPATIENT
Start: 2024-11-16

## 2024-11-16 RX ADMIN — KETOROLAC TROMETHAMINE 30 MG: 30 INJECTION, SOLUTION INTRAMUSCULAR at 21:46

## 2024-11-16 RX ADMIN — LIDOCAINE 1 PATCH: 50 PATCH CUTANEOUS at 21:46

## 2024-11-16 RX ADMIN — HYDROCODONE BITARTRATE AND ACETAMINOPHEN 1 TABLET: 5; 325 TABLET ORAL at 23:30

## 2024-11-16 RX ADMIN — DIAZEPAM 5 MG: 5 TABLET ORAL at 21:46

## 2024-11-17 NOTE — ED PROVIDER NOTES
Time reflects when diagnosis was documented in both MDM as applicable and the Disposition within this note       Time User Action Codes Description Comment    11/16/2024 11:11 PM Ally Pena Add [M54.50] Acute low back pain without sciatica, unspecified back pain laterality     11/16/2024 11:11 PM Ally Pena Add [M54.9] Musculoskeletal back pain           ED Disposition       ED Disposition   Discharge    Condition   Stable    Date/Time   Sat Nov 16, 2024 11:11 PM    Comment   Jia Armstrong discharge to home/self care.                   Assessment & Plan       Medical Decision Making  Is a 34-year-old female who presents to the emergency department with lower back pain.  She states that she was helping her  carry wood, and felt a pulling when she leaned down to put wood inside by the fireplace.  She states that she then bent over again a short time later to clean the carpet in her daughter's room.  She states standing up from bending over she experienced more significant pain and had difficulty ambulating.  She had some improvement with a heating pad placed by her .  She has no red flag signs or symptoms.  Does report a history of upper back spasm, as well as intermittent sciatic type pain in the lower extremities.  No sciatica at this time.  Strongly suspect musculoskeletal back pain.  Counseled the patient on conservative management and need for follow-up should the pain persist for greater than 6 weeks.  She expresses understanding and agreement of this plan    Risk  Prescription drug management.             Medications   lidocaine (LIDODERM) 5 % patch 1 patch (1 patch Topical Medication Applied 11/16/24 2146)   ketorolac (TORADOL) injection 30 mg (30 mg Intramuscular Given 11/16/24 2146)   diazepam (VALIUM) tablet 5 mg (5 mg Oral Given 11/16/24 2146)   HYDROcodone-acetaminophen (NORCO) 5-325 mg per tablet 1 tablet (1 tablet Oral Given 11/16/24 2330)       ED Risk Strat Scores     "                       SBIRT 22yo+      Flowsheet Row Most Recent Value   Initial Alcohol Screen: US AUDIT-C     1. How often do you have a drink containing alcohol? 1 Filed at: 11/16/2024 2032   2. How many drinks containing alcohol do you have on a typical day you are drinking?  1 Filed at: 11/16/2024 2032   3b. FEMALE Any Age, or MALE 65+: How often do you have 4 or more drinks on one occassion? 0 Filed at: 11/16/2024 2032   Audit-C Score 2 Filed at: 11/16/2024 2032   BLANQUITA: How many times in the past year have you...    Used an illegal drug or used a prescription medication for non-medical reasons? Never Filed at: 11/16/2024 2032                            History of Present Illness       Chief Complaint   Patient presents with    Back Pain     Pt presents in wheelchair c/o \"lower back pain after holding a small wood log earlier and bending over to put it down 2 hrs agp.\" Pt tearful in triage.       Past Medical History:   Diagnosis Date    Anxiety     Depression     Hypertension     Insomnia     Lupus     Migraines       Past Surgical History:   Procedure Laterality Date    REDUCTION MAMMAPLASTY  2015      Family History   Problem Relation Age of Onset    Breast cancer Mother     Colon cancer Father     Colon cancer Paternal Uncle       Social History     Tobacco Use    Smoking status: Never     Passive exposure: Never    Smokeless tobacco: Never   Vaping Use    Vaping status: Never Used   Substance Use Topics    Alcohol use: Not Currently    Drug use: Yes     Types: Marijuana      E-Cigarette/Vaping    E-Cigarette Use Never User       E-Cigarette/Vaping Substances    Nicotine No     THC No     CBD No     Flavoring No     Other No     Unknown No       I have reviewed and agree with the history as documented.     Patient is a 34-year-old female who presents with significant lower back pain.          Review of Systems   Musculoskeletal:  Positive for back pain.           Objective       ED Triage Vitals [11/16/24 " ]   Temperature Pulse Blood Pressure Respirations SpO2 Patient Position - Orthostatic VS   98 °F (36.7 °C) 95 125/75 20 100 % --      Temp Source Heart Rate Source BP Location FiO2 (%) Pain Score    Oral -- -- -- --      Vitals      Date and Time Temp Pulse SpO2 Resp BP Pain Score FACES Pain Rating User   240 -- 74 96 % -- 126/63 -- -- MO   24 -- 78 99 % -- 121/67 -- -- MO   24 98 °F (36.7 °C) 95 100 % 20 125/75 -- -- EN            Physical Exam  Vitals and nursing note reviewed.   Constitutional:       Appearance: Normal appearance.      Comments: Uncomfortable appearing   HENT:      Head: Normocephalic and atraumatic.      Right Ear: External ear normal.      Left Ear: External ear normal.      Nose: Nose normal.   Cardiovascular:      Rate and Rhythm: Normal rate and regular rhythm.   Pulmonary:      Effort: Pulmonary effort is normal. No respiratory distress.   Skin:     General: Skin is warm and dry.   Neurological:      General: No focal deficit present.      Mental Status: She is alert and oriented to person, place, and time. Mental status is at baseline.   Psychiatric:         Behavior: Behavior normal.         Results Reviewed       None            No orders to display       Procedures    ED Medication and Procedure Management   Prior to Admission Medications   Prescriptions Last Dose Informant Patient Reported? Taking?   ALPRAZolam (XANAX) 0.5 mg tablet   Yes No   Sig: Take 0.25 mg by mouth if needed   Ascorbic Acid (vitamin C) 1000 MG tablet   Yes No   Sig: Take 1,000 mg by mouth daily   Benlysta 200 MG/ML SOAJ   Yes No   Si mg Once a week   Cholecalciferol (Vitamin D) 50 MCG (2000) tablet   Yes No   Sig: Take 2,000 Units by mouth daily   DULoxetine (CYMBALTA) 30 mg delayed release capsule   Yes No   Sig: Take 30 mg by mouth 2 (two) times a day   Diclofenac Sodium (VOLTAREN) 1 %   Yes No   Sig: Apply 2 g topically 4 (four) times a day   EPINEPHrine (EPIPEN) 0.3  mg/0.3 mL SOAJ   No No   Sig: Inject 0.3 mL (0.3 mg total) into a muscle once for 1 dose   Melatonin 3 MG/4ML LIQD   Yes No   Sig: Take by mouth   Norethin-Eth Estrad-Fe Biphas 1 MG-10 MCG / 10 MCG TABS   No No   Sig: Take 1 tablet by mouth in the morning   Ubrelvy 100 MG tablet   No No   Sig: Take 1 tablet (100 mg total) by mouth once as needed (migraines) for up to 1 dose   albuterol (PROVENTIL HFA,VENTOLIN HFA) 90 mcg/act inhaler   No No   Sig: TAKE 2 PUFFS BY MOUTH EVERY 6 HOURS AS NEEDED FOR WHEEZE OR FOR SHORTNESS OF BREATH   buPROPion (WELLBUTRIN XL) 300 mg 24 hr tablet   Yes No   Sig: Take 300 mg by mouth daily Tahe 450 mg daily   busPIRone (BUSPAR) 10 mg tablet   Yes No   Sig: Take 20 mg by mouth 2 (two) times a day   cetirizine (ZyrTEC) 10 mg tablet   No No   Sig: Take 1 tablet (10 mg total) by mouth daily   clindamycin-benzoyl peroxide (BENZACLIN) gel   No No   Sig: Apply topically 2 (two) times a day   clonazePAM (KlonoPIN) 0.5 mg tablet   Yes No   Sig: Take 0.5 mg by mouth 3 (three) times a day   esomeprazole (NexIUM) 40 MG capsule   No No   Sig: TAKE 1 CAPSULE BY MOUTH 2 TIMES A DAY BEFORE MEALS.   fluticasone (FLONASE) 50 mcg/act nasal spray   No No   Si spray into each nostril daily   gabapentin (NEURONTIN) 300 mg capsule   Yes No   Sig: Take 300 mg by mouth 2 (two) times a day Take 2 caps BID   hydroxychloroquine (PLAQUENIL) 200 mg tablet   Yes No   Sig: Take 200 mg by mouth 2 (two) times a day with meals   lisinopril (ZESTRIL) 5 mg tablet   No No   Sig: TAKE 1 TABLET (5 MG TOTAL) BY MOUTH DAILY.   methylPREDNISolone 4 MG tablet therapy pack   No No   Sig: Use as directed on package   saccharomyces boulardii (FLORASTOR) 250 mg capsule   Yes No   Sig: Take 250 mg by mouth 2 (two) times a day   vitamin B-12 (VITAMIN B-12) 1,000 mcg tablet   Yes No   Sig: Take by mouth daily      Facility-Administered Medications: None     Discharge Medication List as of 2024 11:14 PM        START taking  these medications    Details   lidocaine (Lidoderm) 5 % Apply 1 patch topically over 12 hours daily Remove & Discard patch within 12 hours or as directed by MD, Starting Sat 11/16/2024, Normal      naproxen (Naprosyn) 500 mg tablet Take 1 tablet (500 mg total) by mouth 2 (two) times a day with meals, Starting Sat 11/16/2024, Normal      tiZANidine (ZANAFLEX) 4 mg tablet Take 1 tablet (4 mg total) by mouth every 8 (eight) hours as needed for muscle spasms, Starting Sat 11/16/2024, Normal           CONTINUE these medications which have NOT CHANGED    Details   albuterol (PROVENTIL HFA,VENTOLIN HFA) 90 mcg/act inhaler TAKE 2 PUFFS BY MOUTH EVERY 6 HOURS AS NEEDED FOR WHEEZE OR FOR SHORTNESS OF BREATH, Normal      ALPRAZolam (XANAX) 0.5 mg tablet Take 0.25 mg by mouth if needed, Historical Med      Ascorbic Acid (vitamin C) 1000 MG tablet Take 1,000 mg by mouth daily, Historical Med      Benlysta 200 MG/ML SOAJ 200 mg Once a week, Starting Thu 7/18/2024, Historical Med      buPROPion (WELLBUTRIN XL) 300 mg 24 hr tablet Take 300 mg by mouth daily Tahe 450 mg daily, Historical Med      busPIRone (BUSPAR) 10 mg tablet Take 20 mg by mouth 2 (two) times a day, Historical Med      cetirizine (ZyrTEC) 10 mg tablet Take 1 tablet (10 mg total) by mouth daily, Starting Wed 9/18/2024, Normal      Cholecalciferol (Vitamin D) 50 MCG (2000 UT) tablet Take 2,000 Units by mouth daily, Historical Med      clindamycin-benzoyl peroxide (BENZACLIN) gel Apply topically 2 (two) times a day, Starting Wed 7/24/2024, Normal      clonazePAM (KlonoPIN) 0.5 mg tablet Take 0.5 mg by mouth 3 (three) times a day, Historical Med      Diclofenac Sodium (VOLTAREN) 1 % Apply 2 g topically 4 (four) times a day, Historical Med      DULoxetine (CYMBALTA) 30 mg delayed release capsule Take 30 mg by mouth 2 (two) times a day, Starting Fri 9/6/2024, Historical Med      EPINEPHrine (EPIPEN) 0.3 mg/0.3 mL SOAJ Inject 0.3 mL (0.3 mg total) into a muscle once for  1 dose, Starting Thu 11/7/2024, Normal      esomeprazole (NexIUM) 40 MG capsule TAKE 1 CAPSULE BY MOUTH 2 TIMES A DAY BEFORE MEALS., Normal      fluticasone (FLONASE) 50 mcg/act nasal spray 1 spray into each nostril daily, Starting Wed 9/18/2024, Normal      gabapentin (NEURONTIN) 300 mg capsule Take 300 mg by mouth 2 (two) times a day Take 2 caps BID, Starting Tue 5/21/2024, Historical Med      hydroxychloroquine (PLAQUENIL) 200 mg tablet Take 200 mg by mouth 2 (two) times a day with meals, Historical Med      lisinopril (ZESTRIL) 5 mg tablet TAKE 1 TABLET (5 MG TOTAL) BY MOUTH DAILY., Starting Wed 11/13/2024, Normal      Melatonin 3 MG/4ML LIQD Take by mouth, Historical Med      methylPREDNISolone 4 MG tablet therapy pack Use as directed on package, Normal      Norethin-Eth Estrad-Fe Biphas 1 MG-10 MCG / 10 MCG TABS Take 1 tablet by mouth in the morning, Starting Fri 7/19/2024, Normal      saccharomyces boulardii (FLORASTOR) 250 mg capsule Take 250 mg by mouth 2 (two) times a day, Historical Med      Ubrelvy 100 MG tablet Take 1 tablet (100 mg total) by mouth once as needed (migraines) for up to 1 dose, Starting Wed 7/24/2024, Normal      vitamin B-12 (VITAMIN B-12) 1,000 mcg tablet Take by mouth daily, Historical Med           No discharge procedures on file.  ED SEPSIS DOCUMENTATION   Time reflects when diagnosis was documented in both MDM as applicable and the Disposition within this note       Time User Action Codes Description Comment    11/16/2024 11:11 PM Ally Pena Add [M54.50] Acute low back pain without sciatica, unspecified back pain laterality     11/16/2024 11:11 PM Ally Pena [M54.9] Musculoskeletal back pain                  Ally Pena MD  11/17/24 0005

## 2024-11-26 ENCOUNTER — TELEPHONE (OUTPATIENT)
Age: 34
End: 2024-11-26

## 2024-11-26 NOTE — TELEPHONE ENCOUNTER
Contacted patient off of Medication Management  to verify needs of services in attempts to offer patient an appointment. LVM for patient to contact intake dept  in regards to wait list

## 2024-12-05 ENCOUNTER — TELEPHONE (OUTPATIENT)
Age: 34
End: 2024-12-05

## 2024-12-05 ENCOUNTER — TELEPHONE (OUTPATIENT)
Dept: NEPHROLOGY | Facility: CLINIC | Age: 34
End: 2024-12-05

## 2024-12-05 DIAGNOSIS — R79.89 HIGH SERUM VITAMIN A: Primary | ICD-10-CM

## 2024-12-05 NOTE — TELEPHONE ENCOUNTER
[facility] called in stating they received a referral for high serum vitamin A. Provider viewed  patients referral and said they do not see patients for that so the referral has been closed. Please advise.

## 2024-12-05 NOTE — TELEPHONE ENCOUNTER
Called [facility] spoke with Natalia, advised we received referral from provider Sydney Riggs PA-C for High Serum Vitamin A on patient. Advised per our nephrology provider  review patient's chart and advised that at this moment there is nothing we will be able to do on nephrology perspective. Per  renal functions are normal. He does recommend patient be seen by  endocrinology and patient does have appointment setup with them on 01/14/25. Advised natalia patient referral has been closed for nephrology at this time. Natalia verbalized understanding and is okay with it. natalia will advise provider so she is aware.    Called left voice message for patient and advised.

## 2024-12-05 NOTE — TELEPHONE ENCOUNTER
Please advise nephrology does not see elevated levels of vitamin A. Would recommend seeing endo as scheduled.

## 2024-12-10 ENCOUNTER — TELEPHONE (OUTPATIENT)
Age: 34
End: 2024-12-10

## 2024-12-10 NOTE — TELEPHONE ENCOUNTER
Cierra - can we please submit a letter of appeal?  It also looks like insurance recommended a chest XR as first line. If patient is agreeable I can order.

## 2024-12-10 NOTE — TELEPHONE ENCOUNTER
Jia called back and in and during the attempt to warm transfer to office call was disconnected.  Previous telephone encounter from 12/05/24.

## 2024-12-10 NOTE — TELEPHONE ENCOUNTER
Patient called because her prior authorization was denied for her CT scan of her chest/pelvis w/contrast.      Patient would like to know if anything can be done to fight this denial?    Please advise and notify.    Thank you.

## 2024-12-12 ENCOUNTER — TELEPHONE (OUTPATIENT)
Age: 34
End: 2024-12-12

## 2024-12-12 DIAGNOSIS — M25.50 POLYARTHRALGIA: ICD-10-CM

## 2024-12-12 DIAGNOSIS — E78.5 HYPERLIPIDEMIA, UNSPECIFIED HYPERLIPIDEMIA TYPE: Primary | ICD-10-CM

## 2024-12-12 DIAGNOSIS — R63.4 WEIGHT LOSS, UNINTENTIONAL: ICD-10-CM

## 2024-12-12 DIAGNOSIS — R79.89 HIGH SERUM VITAMIN A: ICD-10-CM

## 2024-12-12 NOTE — TELEPHONE ENCOUNTER
Called patient and spoke -- ordered CXR which she will get completed. Are we able to call her insurance company and see why they denied the CT and make an appeal or can I complete a peer to peer?    We also ordered some additional testing for further evaluation of her elevated vitamin A levels.

## 2024-12-12 NOTE — TELEPHONE ENCOUNTER
Patient called and said she is fine to start with a chest xray but would like to speak to Sydney Riggs about the peer to peer before she does the appeal. Please call patient [phone]  Thank you

## 2024-12-13 ENCOUNTER — APPOINTMENT (OUTPATIENT)
Dept: RADIOLOGY | Facility: CLINIC | Age: 34
End: 2024-12-13
Payer: COMMERCIAL

## 2024-12-13 DIAGNOSIS — R63.4 WEIGHT LOSS, UNINTENTIONAL: ICD-10-CM

## 2024-12-13 PROCEDURE — 71046 X-RAY EXAM CHEST 2 VIEWS: CPT

## 2024-12-16 ENCOUNTER — RESULTS FOLLOW-UP (OUTPATIENT)
Dept: FAMILY MEDICINE CLINIC | Facility: CLINIC | Age: 34
End: 2024-12-16

## 2024-12-17 ENCOUNTER — TELEPHONE (OUTPATIENT)
Dept: FAMILY MEDICINE CLINIC | Facility: CLINIC | Age: 34
End: 2024-12-17

## 2024-12-23 ENCOUNTER — TELEPHONE (OUTPATIENT)
Dept: FAMILY MEDICINE CLINIC | Facility: CLINIC | Age: 34
End: 2024-12-23

## 2024-12-23 DIAGNOSIS — R63.4 WEIGHT LOSS, UNINTENTIONAL: Primary | ICD-10-CM

## 2024-12-23 DIAGNOSIS — R19.8 ALTERNATING CONSTIPATION AND DIARRHEA: ICD-10-CM

## 2024-12-23 NOTE — TELEPHONE ENCOUNTER
I called and completed patient's peer to peer. They approved the CT abdomen/pelvis which was re-ordered.   They also recommended a stool test to make sure no blood is in the stool which was also ordered.   In terms of the CT chest, given her CXR was clear and she is not a smoker they denied that.    Therefore the CT abdomen/pelvis was re-ordered and authorized so she can get that completed.   The authorization number is [id number]

## 2024-12-27 DIAGNOSIS — R19.8 ALTERNATING CONSTIPATION AND DIARRHEA: ICD-10-CM

## 2024-12-27 DIAGNOSIS — R63.4 WEIGHT LOSS, UNINTENTIONAL: Primary | ICD-10-CM

## 2024-12-28 ENCOUNTER — OFFICE VISIT (OUTPATIENT)
Dept: URGENT CARE | Facility: CLINIC | Age: 34
End: 2024-12-28
Payer: COMMERCIAL

## 2024-12-28 VITALS
TEMPERATURE: 97.4 F | RESPIRATION RATE: 18 BRPM | SYSTOLIC BLOOD PRESSURE: 129 MMHG | HEART RATE: 83 BPM | DIASTOLIC BLOOD PRESSURE: 65 MMHG | OXYGEN SATURATION: 98 %

## 2024-12-28 DIAGNOSIS — A08.4 VIRAL GASTROENTERITIS: Primary | ICD-10-CM

## 2024-12-28 PROCEDURE — G0382 LEV 3 HOSP TYPE B ED VISIT: HCPCS

## 2024-12-28 RX ORDER — BUPROPION HYDROCHLORIDE 150 MG/1
TABLET ORAL
COMMUNITY

## 2024-12-28 RX ORDER — PREDNISONE 5 MG/1
TABLET ORAL
COMMUNITY
Start: 2024-12-09

## 2024-12-28 RX ORDER — ATOGEPANT 60 MG/1
TABLET ORAL
COMMUNITY
Start: 2024-12-27

## 2024-12-28 RX ORDER — DULOXETIN HYDROCHLORIDE 60 MG/1
2 CAPSULE, DELAYED RELEASE ORAL DAILY
COMMUNITY
Start: 2024-12-12

## 2024-12-28 RX ORDER — ONDANSETRON 4 MG/1
4 TABLET, FILM COATED ORAL EVERY 6 HOURS PRN
Qty: 30 TABLET | Refills: 0 | Status: SHIPPED | OUTPATIENT
Start: 2024-12-28

## 2024-12-28 NOTE — PATIENT INSTRUCTIONS
Zofran as prescribed   Ensure adequate hydration   BRAT diet, advance as tolerated   Practice proper hand hygiene     Follow up with PCP in 3-5 days.  Proceed to  ER if symptoms worsen.

## 2024-12-28 NOTE — PROGRESS NOTES
[facility]        NAME: Jia Armstrong is a 34 y.o. female  : 1990    MRN: [id number]  DATE: 2024  TIME: 2:47 PM    Assessment and Plan   Viral gastroenteritis [A08.4]  1. Viral gastroenteritis  ondansetron (ZOFRAN) 4 mg tablet        Continue supportive management, slowly reintroduce foods via BRAT diet. Supportive management with zofran. Follow up with PCP as needed for chronic GI issues.     Patient Instructions     Zofran as prescribed   Ensure adequate hydration   BRAT diet, advance as tolerated   Practice proper hand hygiene     Follow up with PCP in 3-5 days.  Proceed to  ER if symptoms worsen.      If tests are performed, our office will contact you with results only if changes need to made to the care plan discussed with you at the visit. You can review your full results on [facility].    Chief Complaint     Chief Complaint   Patient presents with    Diarrhea     Pt started with diarrhea x6 days. Vomiting was first 2 days of symptoms. Fevers and on off. Abdominal discomfort         History of Present Illness       Diarrhea   This is a new problem. The current episode started in the past 7 days. The problem occurs 2 to 4 times per day. Progression since onset: Started worsening again when eating. The stool consistency is described as Watery. The patient states that diarrhea does not awaken her from sleep. Associated symptoms include bloating, a fever (intermittent) and vomiting (now resolved). Pertinent negatives include no abdominal pain, chills, coughing, headaches or myalgias. There are no known risk factors.       Review of Systems   Review of Systems   Constitutional:  Positive for fatigue and fever (intermittent). Negative for chills.   HENT:  Negative for congestion, postnasal drip, rhinorrhea, sinus pressure, sore throat and trouble swallowing.    Respiratory:  Negative for cough, chest tightness and shortness of breath.    Cardiovascular:  Negative for chest  pain and palpitations.   Gastrointestinal:  Positive for bloating, diarrhea and vomiting (now resolved). Negative for abdominal pain and nausea.   Genitourinary:  Negative for difficulty urinating.   Musculoskeletal:  Negative for myalgias.   Neurological:  Negative for dizziness and headaches.         Current Medications       Current Outpatient Medications:     ALPRAZolam (XANAX) 0.5 mg tablet, Take 0.25 mg by mouth if needed, Disp: , Rfl:     Ascorbic Acid (vitamin C) 1000 MG tablet, Take 1,000 mg by mouth daily, Disp: , Rfl:     Benlysta 200 MG/ML SOAJ, 200 mg Once a week, Disp: , Rfl:     buPROPion (WELLBUTRIN XL) 150 mg 24 hr tablet, TAKE 1 TABLET BY MOUTH DAILY WITH 300 MG TABLET, Disp: , Rfl:     buPROPion (WELLBUTRIN XL) 300 mg 24 hr tablet, Take 300 mg by mouth daily Tahe 450 mg daily, Disp: , Rfl:     busPIRone (BUSPAR) 10 mg tablet, Take 20 mg by mouth 2 (two) times a day, Disp: , Rfl:     Cholecalciferol (Vitamin D) 50 MCG (2000 UT) tablet, Take 2,000 Units by mouth daily, Disp: , Rfl:     clindamycin-benzoyl peroxide (BENZACLIN) gel, Apply topically 2 (two) times a day, Disp: 25 g, Rfl: 0    clonazePAM (KlonoPIN) 0.5 mg tablet, Take 0.5 mg by mouth 3 (three) times a day, Disp: , Rfl:     Diclofenac Sodium (VOLTAREN) 1 %, Apply 2 g topically 4 (four) times a day, Disp: , Rfl:     DULoxetine (CYMBALTA) 60 mg delayed release capsule, Take 2 capsules by mouth in the morning, Disp: , Rfl:     EPINEPHrine (EPIPEN) 0.3 mg/0.3 mL SOAJ, Inject 0.3 mL (0.3 mg total) into a muscle once for 1 dose, Disp: 0.6 mL, Rfl: 0    esomeprazole (NexIUM) 40 MG capsule, TAKE 1 CAPSULE BY MOUTH 2 TIMES A DAY BEFORE MEALS., Disp: 180 capsule, Rfl: 1    fluticasone (FLONASE) 50 mcg/act nasal spray, 1 spray into each nostril daily, Disp: 16 g, Rfl: 0    gabapentin (NEURONTIN) 300 mg capsule, Take 300 mg by mouth 2 (two) times a day Take 2 caps BID, Disp: , Rfl:     hydroxychloroquine (PLAQUENIL) 200 mg tablet, Take 200 mg by  mouth 2 (two) times a day with meals, Disp: , Rfl:     lisinopril (ZESTRIL) 5 mg tablet, TAKE 1 TABLET (5 MG TOTAL) BY MOUTH DAILY., Disp: 90 tablet, Rfl: 0    Norethin-Eth Estrad-Fe Biphas 1 MG-10 MCG / 10 MCG TABS, Take 1 tablet by mouth in the morning, Disp: 90 tablet, Rfl: 4    ondansetron (ZOFRAN) 4 mg tablet, Take 1 tablet (4 mg total) by mouth every 6 (six) hours as needed for nausea or vomiting, Disp: 30 tablet, Rfl: 0    Qulipta 60 MG TABS, , Disp: , Rfl:     saccharomyces boulardii (FLORASTOR) 250 mg capsule, Take 250 mg by mouth 2 (two) times a day, Disp: , Rfl:     Ubrelvy 100 MG tablet, Take 1 tablet (100 mg total) by mouth once as needed (migraines) for up to 1 dose, Disp: 9 tablet, Rfl: 0    albuterol (PROVENTIL HFA,VENTOLIN HFA) 90 mcg/act inhaler, TAKE 2 PUFFS BY MOUTH EVERY 6 HOURS AS NEEDED FOR WHEEZE OR FOR SHORTNESS OF BREATH (Patient not taking: Reported on 12/28/2024), Disp: 6.7 g, Rfl: 5    cetirizine (ZyrTEC) 10 mg tablet, Take 1 tablet (10 mg total) by mouth daily (Patient not taking: Reported on 12/28/2024), Disp: 30 tablet, Rfl: 0    DTx [facility] - Musculoskeletal ([facility]) MISC, , Disp: , Rfl:     DULoxetine (CYMBALTA) 30 mg delayed release capsule, Take 30 mg by mouth 2 (two) times a day (Patient not taking: Reported on 12/28/2024), Disp: , Rfl:     lidocaine (Lidoderm) 5 %, Apply 1 patch topically over 12 hours daily Remove & Discard patch within 12 hours or as directed by MD (Patient not taking: Reported on 12/28/2024), Disp: 30 patch, Rfl: 0    Melatonin 3 MG/4ML LIQD, Take by mouth (Patient not taking: Reported on 12/28/2024), Disp: , Rfl:     Melatonin 5 MG TABS, Take 3 mg by mouth (Patient not taking: Reported on 12/28/2024), Disp: , Rfl:     methylPREDNISolone 4 MG tablet therapy pack, Use as directed on package (Patient not taking: Reported on 12/28/2024), Disp: 21 each, Rfl: 0    naproxen (Naprosyn) 500 mg tablet, Take 1 tablet (500 mg total) by mouth 2 (two)  times a day with meals (Patient not taking: Reported on 12/28/2024), Disp: 30 tablet, Rfl: 0    predniSONE 5 mg tablet, , Disp: , Rfl:     tiZANidine (ZANAFLEX) 4 mg tablet, Take 1 tablet (4 mg total) by mouth every 8 (eight) hours as needed for muscle spasms (Patient not taking: Reported on 12/28/2024), Disp: 30 tablet, Rfl: 0    vitamin B-12 (VITAMIN B-12) 1,000 mcg tablet, Take by mouth daily (Patient not taking: Reported on 12/28/2024), Disp: , Rfl:     Current Allergies     Allergies as of 12/28/2024 - Reviewed 12/28/2024   Allergen Reaction Noted    Bee venom Anaphylaxis and Hives 07/19/2024    Amoxicillin Hives 01/20/2024    Topamax [topiramate] Delirium 01/20/2024    Onion - food allergy Headache 11/09/2021            The following portions of the patient's history were reviewed and updated as appropriate: allergies, current medications, past family history, past medical history, past social history, past surgical history and problem list.     Past Medical History:   Diagnosis Date    Anxiety     Depression     Hypertension     Insomnia     Lupus     Migraines        Past Surgical History:   Procedure Laterality Date    REDUCTION MAMMAPLASTY  2015       Family History   Problem Relation Age of Onset    Breast cancer Mother     Colon cancer Father     Colon cancer Paternal Uncle          Medications have been verified.        Objective   /65   Pulse 83   Temp (!) 97.4 °F (36.3 °C)   Resp 18   SpO2 98%        Physical Exam     Physical Exam  Constitutional:       General: She is not in acute distress.     Appearance: She is not ill-appearing or diaphoretic.      Comments: Tired appearing   HENT:      Nose: Nose normal.      Mouth/Throat:      Mouth: Mucous membranes are moist.      Pharynx: Oropharynx is clear.   Eyes:      Pupils: Pupils are equal, round, and reactive to light.   Cardiovascular:      Rate and Rhythm: Normal rate and regular rhythm.      Pulses: Normal pulses.      Heart sounds:  Normal heart sounds. No murmur heard.     No gallop.   Pulmonary:      Effort: Pulmonary effort is normal. No respiratory distress.      Breath sounds: Normal breath sounds. No wheezing.   Abdominal:      General: Abdomen is flat. Bowel sounds are normal. There is no distension.      Palpations: Abdomen is soft. There is no mass.      Tenderness: There is no abdominal tenderness. There is no guarding or rebound.   Musculoskeletal:         General: Normal range of motion.      Cervical back: Normal range of motion.   Skin:     General: Skin is warm and dry.      Capillary Refill: Capillary refill takes less than 2 seconds.   Neurological:      Mental Status: She is alert and oriented to person, place, and time.

## 2024-12-31 ENCOUNTER — HOSPITAL ENCOUNTER (OUTPATIENT)
Dept: RADIOLOGY | Facility: HOSPITAL | Age: 34
Discharge: HOME | End: 2024-12-31
Payer: COMMERCIAL

## 2024-12-31 DIAGNOSIS — R19.8 ALTERNATING CONSTIPATION AND DIARRHEA: ICD-10-CM

## 2024-12-31 DIAGNOSIS — R63.4 WEIGHT LOSS, UNINTENTIONAL: ICD-10-CM

## 2024-12-31 PROCEDURE — 74177 CT ABD & PELVIS W/CONTRAST: CPT

## 2024-12-31 RX ADMIN — IOHEXOL 75 ML: 350 INJECTION, SOLUTION INTRAVENOUS at 13:46

## 2025-01-06 ENCOUNTER — TELEPHONE (OUTPATIENT)
Dept: GASTROENTEROLOGY | Facility: CLINIC | Age: 35
End: 2025-01-06

## 2025-01-07 ENCOUNTER — RESULTS FOLLOW-UP (OUTPATIENT)
Dept: FAMILY MEDICINE CLINIC | Facility: CLINIC | Age: 35
End: 2025-01-07

## 2025-01-13 ENCOUNTER — TELEPHONE (OUTPATIENT)
Age: 35
End: 2025-01-13

## 2025-01-13 NOTE — TELEPHONE ENCOUNTER
Patient would like Sydney Riggs to give her a call back as soon as possible. She said its in regards to Vitamin A toxicity.        Jia: [phone]

## 2025-01-15 ENCOUNTER — APPOINTMENT (OUTPATIENT)
Dept: LAB | Facility: CLINIC | Age: 35
End: 2025-01-15

## 2025-01-15 DIAGNOSIS — Z00.6 ENCOUNTER FOR EXAMINATION FOR NORMAL COMPARISON OR CONTROL IN CLINICAL RESEARCH PROGRAM: ICD-10-CM

## 2025-01-15 PROCEDURE — 36415 COLL VENOUS BLD VENIPUNCTURE: CPT

## 2025-01-22 ENCOUNTER — CONSULT (OUTPATIENT)
Dept: GASTROENTEROLOGY | Facility: CLINIC | Age: 35
End: 2025-01-22
Payer: COMMERCIAL

## 2025-01-22 VITALS
HEART RATE: 80 BPM | BODY MASS INDEX: 33.53 KG/M2 | HEIGHT: 67 IN | OXYGEN SATURATION: 99 % | WEIGHT: 213.6 LBS | TEMPERATURE: 97 F

## 2025-01-22 DIAGNOSIS — Z80.0 FAMILY HISTORY OF COLON CANCER: ICD-10-CM

## 2025-01-22 DIAGNOSIS — A08.4 VIRAL GASTROENTERITIS: ICD-10-CM

## 2025-01-22 DIAGNOSIS — R19.7 DIARRHEA OF PRESUMED INFECTIOUS ORIGIN: ICD-10-CM

## 2025-01-22 DIAGNOSIS — R11.0 NAUSEA: ICD-10-CM

## 2025-01-22 DIAGNOSIS — R63.4 WEIGHT LOSS, ABNORMAL: Primary | ICD-10-CM

## 2025-01-22 DIAGNOSIS — K21.9 GASTROESOPHAGEAL REFLUX DISEASE, UNSPECIFIED WHETHER ESOPHAGITIS PRESENT: ICD-10-CM

## 2025-01-22 DIAGNOSIS — R10.10 PAIN OF UPPER ABDOMEN: ICD-10-CM

## 2025-01-22 PROCEDURE — 99204 OFFICE O/P NEW MOD 45 MIN: CPT | Performed by: PHYSICIAN ASSISTANT

## 2025-01-22 RX ORDER — DICYCLOMINE HCL 20 MG
20 TABLET ORAL EVERY 6 HOURS PRN
Qty: 60 TABLET | Refills: 3 | Status: SHIPPED | OUTPATIENT
Start: 2025-01-22

## 2025-01-22 RX ORDER — SODIUM, POTASSIUM,MAG SULFATES 17.5-3.13G
360 SOLUTION, RECONSTITUTED, ORAL ORAL ONCE
Qty: 360 ML | Refills: 0 | Status: SHIPPED | OUTPATIENT
Start: 2025-01-22 | End: 2025-01-22

## 2025-01-22 RX ORDER — ONDANSETRON 4 MG/1
4 TABLET, FILM COATED ORAL EVERY 6 HOURS PRN
Qty: 30 TABLET | Refills: 4 | Status: SHIPPED | OUTPATIENT
Start: 2025-01-22

## 2025-01-22 NOTE — PROGRESS NOTES
Name: Jia Armstrong      : 1990      MRN: [id number]  Encounter Provider: Jackie Josue PA-C  Encounter Date: 2025   Encounter department: [facility] [place]  :  Assessment & Plan  Nausea  Started after suffering a stomach virus last winter  Associated with very poor appetite  She has lost 50lbs since symptoms began  Consider PUD, Gastroparesis...   Will f/u EGD  Consider need for GES  Ondansetron PRN  Continue Nexium    Pain of upper abdomen  Constant  Worsening over the past few weeks  Cramping and aching  CT AP without acute findings  Dicyclomine PRN  F/U EGD and Colonoscopy  Orders:    Clostridium difficile toxin by PCR with EIA; Future    Calprotectin,Fecal; Future    Stool Enteric Bacterial Panel by PCR; Future    Ova and parasite examination; Future    Celiac Disease Panel; Future    C-reactive protein; Future    Colonoscopy; Future    EGD; Future    dicyclomine (BENTYL) 20 mg tablet; Take 1 tablet (20 mg total) by mouth every 6 (six) hours as needed (abdominal pain)    Diarrhea of presumed infectious origin  Consistent 5-10 times per day since December when she suffered a stomach virus  History of Cdiff  Will plan stool testing  Will plan colonoscopy pending results      Weight loss, abnormal  50lbs x 1 year      Gastroesophageal reflux disease, unspecified whether esophagitis present  Chronic  On Nexium 40mg daily  Will f/u EGD  Orders:    EGD; Future    Family history of colon cancer  Paternal uncle      She also has a history of recurrent diverticulitis and Cdiff colitis  Last episode was more than 1 year ago    She has an elevated Vitamin A level  She is not on any supplements  She has consulted with a nutritionist and is not over consuming   It seems there must be some disruption in metabolism but the exact cause is unclear      History of Present Illness   HPI  Jia Armstrong is a 34 y.o. female with a history of lupus, fibromyalgia, migraine  headaches,C. difficile colitis, GERD, diverticulitis,who presents for evaluation of nausea, upper abdominal pain, diarrhea and weight loss over the past year.  She reports that the symptoms started initially with a GI virus where she was nauseous, vomiting and having diarrhea.  The acute symptoms resolved but she continued with nausea and very poor appetite.  She admits that she was eating less because of this and ultimately lost 50 pounds.  This was associated with upper abdominal pains and fluctuating bowel habits between diarrhea and constipation.  She reports that just before [place] of last year she suffered another GI virus with nausea, vomiting and diarrhea.  Since that time she has had consistent diarrhea.  She reports she is having between 5 and 10 loose stools daily.  She reports that she is very gassy and her abdominal pain has worsened.  She was not given any antibiotics during that virus that she suffered.  She reports that she does have a history of C. difficile colitis.  She cannot remember when her last episode of this was.  She saw her family doctor to discuss the symptoms and had extensive blood testing done.  This resulted in her finding out that she had an elevated vitamin A level.  She is not taking any supplements that could account for this.  She saw a nutritionist to discuss dietary causes and is not over consuming vitamin A.  All of her other vitamin levels seem to be normal.  She reports a history of recurrent diverticulitis.  She was having an episode at least once a year between 2016 and last year.  She reports that her last episode was more than a year ago.  She has had 2 colonoscopies.  One was in 2016 and 1 was in 2020.  Unfortunately, I do not have access to the reports but she does not believe anything serious was ever found.  She reports a family history of colon cancer in a paternal uncle.  She reports that her father had precancerous polyps.  She also has a history of acid  reflux.  She maintains on Nexium for this.  She reports that as long she takes the Nexium she feels well.  She has had an upper endoscopy and cannot remember if there were any abnormal findings.    History obtained from: patient    Review of Systems   Constitutional:  Positive for appetite change, fatigue and unexpected weight change. Negative for activity change and fever.   Respiratory:  Negative for cough, shortness of breath and wheezing.    Gastrointestinal:  Positive for abdominal pain, constipation, diarrhea and nausea. Negative for abdominal distention, blood in stool and vomiting.   Endocrine: Negative for cold intolerance and heat intolerance.   Genitourinary:  Negative for dysuria, flank pain and hematuria.   Neurological:  Positive for headaches. Negative for dizziness and numbness.     Medical History Reviewed by provider this encounter:  Tobacco  Allergies  Meds  Problems  Med Hx  Surg Hx  Fam Hx     .  Past Medical History   Past Medical History:   Diagnosis Date    Anxiety     Depression     Hypertension     Insomnia     Lupus     Migraines      Past Surgical History:   Procedure Laterality Date    REDUCTION MAMMAPLASTY  2015     Family History   Problem Relation Age of Onset    Breast cancer Mother     Colon cancer Father     Colon cancer Paternal Uncle       reports that she has never smoked. She has never been exposed to tobacco smoke. She has never used smokeless tobacco. She reports that she does not currently use alcohol. She reports that she does not currently use drugs after having used the following drugs: Marijuana.  Current Outpatient Medications on File Prior to Visit   Medication Sig Dispense Refill    ALPRAZolam (XANAX) 0.5 mg tablet Take 0.25 mg by mouth if needed      Ascorbic Acid (vitamin C) 1000 MG tablet Take 1,000 mg by mouth daily      Benlysta 200 MG/ML SOAJ 200 mg Once a week      buPROPion (WELLBUTRIN XL) 150 mg 24 hr tablet TAKE 1 TABLET BY MOUTH DAILY WITH 300 MG  TABLET      buPROPion (WELLBUTRIN XL) 300 mg 24 hr tablet Take 300 mg by mouth daily Tahe 450 mg daily      busPIRone (BUSPAR) 10 mg tablet Take 20 mg by mouth 2 (two) times a day      Cholecalciferol (Vitamin D) 50 MCG (2000 UT) tablet Take 2,000 Units by mouth daily      clindamycin-benzoyl peroxide (BENZACLIN) gel Apply topically 2 (two) times a day 25 g 0    clonazePAM (KlonoPIN) 0.5 mg tablet Take 0.5 mg by mouth 3 (three) times a day      Diclofenac Sodium (VOLTAREN) 1 % Apply 2 g topically 4 (four) times a day      DULoxetine (CYMBALTA) 60 mg delayed release capsule Take 2 capsules by mouth in the morning 120 mg taken evening      EPINEPHrine (EPIPEN) 0.3 mg/0.3 mL SOAJ Inject 0.3 mL (0.3 mg total) into a muscle once for 1 dose 0.6 mL 0    esomeprazole (NexIUM) 40 MG capsule TAKE 1 CAPSULE BY MOUTH 2 TIMES A DAY BEFORE MEALS. 180 capsule 1    fluticasone (FLONASE) 50 mcg/act nasal spray 1 spray into each nostril daily 16 g 0    gabapentin (NEURONTIN) 300 mg capsule Take 300 mg by mouth 2 (two) times a day Take 2 caps BID      hydroxychloroquine (PLAQUENIL) 200 mg tablet Take 200 mg by mouth 2 (two) times a day with meals      lidocaine (Lidoderm) 5 % Apply 1 patch topically over 12 hours daily Remove & Discard patch within 12 hours or as directed by MD 30 patch 0    lisinopril (ZESTRIL) 5 mg tablet TAKE 1 TABLET (5 MG TOTAL) BY MOUTH DAILY. 90 tablet 0    Norethin-Eth Estrad-Fe Biphas 1 MG-10 MCG / 10 MCG TABS Take 1 tablet by mouth in the morning 90 tablet 4    Qulipta 60 MG TABS       saccharomyces boulardii (FLORASTOR) 250 mg capsule Take 250 mg by mouth 2 (two) times a day      Ubrelvy 100 MG tablet Take 1 tablet (100 mg total) by mouth once as needed (migraines) for up to 1 dose 9 tablet 0    albuterol (PROVENTIL HFA,VENTOLIN HFA) 90 mcg/act inhaler TAKE 2 PUFFS BY MOUTH EVERY 6 HOURS AS NEEDED FOR WHEEZE OR FOR SHORTNESS OF BREATH (Patient not taking: Reported on 1/22/2025) 6.7 g 5    cetirizine  (ZyrTEC) 10 mg tablet Take 1 tablet (10 mg total) by mouth daily (Patient not taking: Reported on 1/22/2025) 30 tablet 0    DTx Yanira - Musculoskeletal ([facility]) MISC  (Patient not taking: Reported on 1/22/2025)      DULoxetine (CYMBALTA) 30 mg delayed release capsule Take 30 mg by mouth 2 (two) times a day (Patient not taking: Reported on 1/22/2025)      Melatonin 3 MG/4ML LIQD Take by mouth (Patient not taking: Reported on 1/22/2025)      Melatonin 5 MG TABS Take 3 mg by mouth (Patient not taking: Reported on 1/22/2025)      methylPREDNISolone 4 MG tablet therapy pack Use as directed on package (Patient not taking: Reported on 1/22/2025) 21 each 0    naproxen (Naprosyn) 500 mg tablet Take 1 tablet (500 mg total) by mouth 2 (two) times a day with meals (Patient not taking: Reported on 1/22/2025) 30 tablet 0    predniSONE 5 mg tablet  (Patient not taking: Reported on 1/22/2025)      tiZANidine (ZANAFLEX) 4 mg tablet Take 1 tablet (4 mg total) by mouth every 8 (eight) hours as needed for muscle spasms (Patient not taking: Reported on 1/22/2025) 30 tablet 0    vitamin B-12 (VITAMIN B-12) 1,000 mcg tablet Take by mouth daily (Patient not taking: Reported on 1/22/2025)      [DISCONTINUED] ondansetron (ZOFRAN) 4 mg tablet Take 1 tablet (4 mg total) by mouth every 6 (six) hours as needed for nausea or vomiting (Patient not taking: Reported on 1/22/2025) 30 tablet 0     No current facility-administered medications on file prior to visit.     Allergies   Allergen Reactions    Bee Venom Anaphylaxis and Hives    Amoxicillin Hives    Topamax [Topiramate] Delirium    Onion - Food Allergy Headache     Per RN, raw onions are a headache trigger    Per RN, raw onions are a headache trigger   Other reaction(s): Headache   Per RN, raw onions are no longer a headache trigger      Current Outpatient Medications on File Prior to Visit   Medication Sig Dispense Refill    ALPRAZolam (XANAX) 0.5 mg tablet Take 0.25 mg  by mouth if needed      Ascorbic Acid (vitamin C) 1000 MG tablet Take 1,000 mg by mouth daily      Benlysta 200 MG/ML SOAJ 200 mg Once a week      buPROPion (WELLBUTRIN XL) 150 mg 24 hr tablet TAKE 1 TABLET BY MOUTH DAILY WITH 300 MG TABLET      buPROPion (WELLBUTRIN XL) 300 mg 24 hr tablet Take 300 mg by mouth daily Tahe 450 mg daily      busPIRone (BUSPAR) 10 mg tablet Take 20 mg by mouth 2 (two) times a day      Cholecalciferol (Vitamin D) 50 MCG (2000 UT) tablet Take 2,000 Units by mouth daily      clindamycin-benzoyl peroxide (BENZACLIN) gel Apply topically 2 (two) times a day 25 g 0    clonazePAM (KlonoPIN) 0.5 mg tablet Take 0.5 mg by mouth 3 (three) times a day      Diclofenac Sodium (VOLTAREN) 1 % Apply 2 g topically 4 (four) times a day      DULoxetine (CYMBALTA) 60 mg delayed release capsule Take 2 capsules by mouth in the morning 120 mg taken evening      EPINEPHrine (EPIPEN) 0.3 mg/0.3 mL SOAJ Inject 0.3 mL (0.3 mg total) into a muscle once for 1 dose 0.6 mL 0    esomeprazole (NexIUM) 40 MG capsule TAKE 1 CAPSULE BY MOUTH 2 TIMES A DAY BEFORE MEALS. 180 capsule 1    fluticasone (FLONASE) 50 mcg/act nasal spray 1 spray into each nostril daily 16 g 0    gabapentin (NEURONTIN) 300 mg capsule Take 300 mg by mouth 2 (two) times a day Take 2 caps BID      hydroxychloroquine (PLAQUENIL) 200 mg tablet Take 200 mg by mouth 2 (two) times a day with meals      lidocaine (Lidoderm) 5 % Apply 1 patch topically over 12 hours daily Remove & Discard patch within 12 hours or as directed by MD 30 patch 0    lisinopril (ZESTRIL) 5 mg tablet TAKE 1 TABLET (5 MG TOTAL) BY MOUTH DAILY. 90 tablet 0    Norethin-Eth Estrad-Fe Biphas 1 MG-10 MCG / 10 MCG TABS Take 1 tablet by mouth in the morning 90 tablet 4    Qulipta 60 MG TABS       saccharomyces boulardii (FLORASTOR) 250 mg capsule Take 250 mg by mouth 2 (two) times a day      Ubrelvy 100 MG tablet Take 1 tablet (100 mg total) by mouth once as needed (migraines) for up to 1  dose 9 tablet 0    albuterol (PROVENTIL HFA,VENTOLIN HFA) 90 mcg/act inhaler TAKE 2 PUFFS BY MOUTH EVERY 6 HOURS AS NEEDED FOR WHEEZE OR FOR SHORTNESS OF BREATH (Patient not taking: Reported on 1/22/2025) 6.7 g 5    cetirizine (ZyrTEC) 10 mg tablet Take 1 tablet (10 mg total) by mouth daily (Patient not taking: Reported on 1/22/2025) 30 tablet 0    DTx Yanira - Musculoskeletal ([facility]) MISC  (Patient not taking: Reported on 1/22/2025)      DULoxetine (CYMBALTA) 30 mg delayed release capsule Take 30 mg by mouth 2 (two) times a day (Patient not taking: Reported on 1/22/2025)      Melatonin 3 MG/4ML LIQD Take by mouth (Patient not taking: Reported on 1/22/2025)      Melatonin 5 MG TABS Take 3 mg by mouth (Patient not taking: Reported on 1/22/2025)      methylPREDNISolone 4 MG tablet therapy pack Use as directed on package (Patient not taking: Reported on 1/22/2025) 21 each 0    naproxen (Naprosyn) 500 mg tablet Take 1 tablet (500 mg total) by mouth 2 (two) times a day with meals (Patient not taking: Reported on 1/22/2025) 30 tablet 0    predniSONE 5 mg tablet  (Patient not taking: Reported on 1/22/2025)      tiZANidine (ZANAFLEX) 4 mg tablet Take 1 tablet (4 mg total) by mouth every 8 (eight) hours as needed for muscle spasms (Patient not taking: Reported on 1/22/2025) 30 tablet 0    vitamin B-12 (VITAMIN B-12) 1,000 mcg tablet Take by mouth daily (Patient not taking: Reported on 1/22/2025)      [DISCONTINUED] ondansetron (ZOFRAN) 4 mg tablet Take 1 tablet (4 mg total) by mouth every 6 (six) hours as needed for nausea or vomiting (Patient not taking: Reported on 1/22/2025) 30 tablet 0     No current facility-administered medications on file prior to visit.      Social History     Tobacco Use    Smoking status: Never     Passive exposure: Never    Smokeless tobacco: Never   Vaping Use    Vaping status: Never Used   Substance and Sexual Activity    Alcohol use: Not Currently    Drug use: Not  "Currently     Types: Marijuana    Sexual activity: Yes        Objective   Pulse 80   Temp (!) 97 °F (36.1 °C) (Temporal)   Ht 5' 6.5\" (1.689 m)   Wt 96.9 kg (213 lb 9.6 oz)   SpO2 99%   BMI 33.96 kg/m²      Physical Exam  Vitals reviewed.   Constitutional:       Appearance: Normal appearance.   HENT:      Head: Normocephalic and atraumatic.   Eyes:      Extraocular Movements: Extraocular movements intact.      Pupils: Pupils are equal, round, and reactive to light.   Cardiovascular:      Rate and Rhythm: Normal rate and regular rhythm.   Abdominal:      General: Bowel sounds are normal. There is no distension.      Palpations: Abdomen is soft. There is no mass.      Tenderness: There is abdominal tenderness.   Skin:     General: Skin is warm and dry.      Coloration: Skin is not jaundiced.   Neurological:      General: No focal deficit present.      Mental Status: She is alert and oriented to person, place, and time.           "

## 2025-01-22 NOTE — H&P (VIEW-ONLY)
Name: Jia Armstrong      : 1990      MRN: [id number]  Encounter Provider: Jackie Josue PA-C  Encounter Date: 2025   Encounter department: [facility] [place]  :  Assessment & Plan  Nausea  Started after suffering a stomach virus last winter  Associated with very poor appetite  She has lost 50lbs since symptoms began  Consider PUD, Gastroparesis...   Will f/u EGD  Consider need for GES  Ondansetron PRN  Continue Nexium    Pain of upper abdomen  Constant  Worsening over the past few weeks  Cramping and aching  CT AP without acute findings  Dicyclomine PRN  F/U EGD and Colonoscopy  Orders:    Clostridium difficile toxin by PCR with EIA; Future    Calprotectin,Fecal; Future    Stool Enteric Bacterial Panel by PCR; Future    Ova and parasite examination; Future    Celiac Disease Panel; Future    C-reactive protein; Future    Colonoscopy; Future    EGD; Future    dicyclomine (BENTYL) 20 mg tablet; Take 1 tablet (20 mg total) by mouth every 6 (six) hours as needed (abdominal pain)    Diarrhea of presumed infectious origin  Consistent 5-10 times per day since December when she suffered a stomach virus  History of Cdiff  Will plan stool testing  Will plan colonoscopy pending results      Weight loss, abnormal  50lbs x 1 year      Gastroesophageal reflux disease, unspecified whether esophagitis present  Chronic  On Nexium 40mg daily  Will f/u EGD  Orders:    EGD; Future    Family history of colon cancer  Paternal uncle      She also has a history of recurrent diverticulitis and Cdiff colitis  Last episode was more than 1 year ago    She has an elevated Vitamin A level  She is not on any supplements  She has consulted with a nutritionist and is not over consuming   It seems there must be some disruption in metabolism but the exact cause is unclear      History of Present Illness   HPI  Jia Armstrong is a 34 y.o. female with a history of lupus, fibromyalgia, migraine  headaches,C. difficile colitis, GERD, diverticulitis,who presents for evaluation of nausea, upper abdominal pain, diarrhea and weight loss over the past year.  She reports that the symptoms started initially with a GI virus where she was nauseous, vomiting and having diarrhea.  The acute symptoms resolved but she continued with nausea and very poor appetite.  She admits that she was eating less because of this and ultimately lost 50 pounds.  This was associated with upper abdominal pains and fluctuating bowel habits between diarrhea and constipation.  She reports that just before [place] of last year she suffered another GI virus with nausea, vomiting and diarrhea.  Since that time she has had consistent diarrhea.  She reports she is having between 5 and 10 loose stools daily.  She reports that she is very gassy and her abdominal pain has worsened.  She was not given any antibiotics during that virus that she suffered.  She reports that she does have a history of C. difficile colitis.  She cannot remember when her last episode of this was.  She saw her family doctor to discuss the symptoms and had extensive blood testing done.  This resulted in her finding out that she had an elevated vitamin A level.  She is not taking any supplements that could account for this.  She saw a nutritionist to discuss dietary causes and is not over consuming vitamin A.  All of her other vitamin levels seem to be normal.  She reports a history of recurrent diverticulitis.  She was having an episode at least once a year between 2016 and last year.  She reports that her last episode was more than a year ago.  She has had 2 colonoscopies.  One was in 2016 and 1 was in 2020.  Unfortunately, I do not have access to the reports but she does not believe anything serious was ever found.  She reports a family history of colon cancer in a paternal uncle.  She reports that her father had precancerous polyps.  She also has a history of acid  reflux.  She maintains on Nexium for this.  She reports that as long she takes the Nexium she feels well.  She has had an upper endoscopy and cannot remember if there were any abnormal findings.    History obtained from: patient    Review of Systems   Constitutional:  Positive for appetite change, fatigue and unexpected weight change. Negative for activity change and fever.   Respiratory:  Negative for cough, shortness of breath and wheezing.    Gastrointestinal:  Positive for abdominal pain, constipation, diarrhea and nausea. Negative for abdominal distention, blood in stool and vomiting.   Endocrine: Negative for cold intolerance and heat intolerance.   Genitourinary:  Negative for dysuria, flank pain and hematuria.   Neurological:  Positive for headaches. Negative for dizziness and numbness.     Medical History Reviewed by provider this encounter:  Tobacco  Allergies  Meds  Problems  Med Hx  Surg Hx  Fam Hx     .  Past Medical History   Past Medical History:   Diagnosis Date    Anxiety     Depression     Hypertension     Insomnia     Lupus     Migraines      Past Surgical History:   Procedure Laterality Date    REDUCTION MAMMAPLASTY  2015     Family History   Problem Relation Age of Onset    Breast cancer Mother     Colon cancer Father     Colon cancer Paternal Uncle       reports that she has never smoked. She has never been exposed to tobacco smoke. She has never used smokeless tobacco. She reports that she does not currently use alcohol. She reports that she does not currently use drugs after having used the following drugs: Marijuana.  Current Outpatient Medications on File Prior to Visit   Medication Sig Dispense Refill    ALPRAZolam (XANAX) 0.5 mg tablet Take 0.25 mg by mouth if needed      Ascorbic Acid (vitamin C) 1000 MG tablet Take 1,000 mg by mouth daily      Benlysta 200 MG/ML SOAJ 200 mg Once a week      buPROPion (WELLBUTRIN XL) 150 mg 24 hr tablet TAKE 1 TABLET BY MOUTH DAILY WITH 300 MG  TABLET      buPROPion (WELLBUTRIN XL) 300 mg 24 hr tablet Take 300 mg by mouth daily Tahe 450 mg daily      busPIRone (BUSPAR) 10 mg tablet Take 20 mg by mouth 2 (two) times a day      Cholecalciferol (Vitamin D) 50 MCG (2000 UT) tablet Take 2,000 Units by mouth daily      clindamycin-benzoyl peroxide (BENZACLIN) gel Apply topically 2 (two) times a day 25 g 0    clonazePAM (KlonoPIN) 0.5 mg tablet Take 0.5 mg by mouth 3 (three) times a day      Diclofenac Sodium (VOLTAREN) 1 % Apply 2 g topically 4 (four) times a day      DULoxetine (CYMBALTA) 60 mg delayed release capsule Take 2 capsules by mouth in the morning 120 mg taken evening      EPINEPHrine (EPIPEN) 0.3 mg/0.3 mL SOAJ Inject 0.3 mL (0.3 mg total) into a muscle once for 1 dose 0.6 mL 0    esomeprazole (NexIUM) 40 MG capsule TAKE 1 CAPSULE BY MOUTH 2 TIMES A DAY BEFORE MEALS. 180 capsule 1    fluticasone (FLONASE) 50 mcg/act nasal spray 1 spray into each nostril daily 16 g 0    gabapentin (NEURONTIN) 300 mg capsule Take 300 mg by mouth 2 (two) times a day Take 2 caps BID      hydroxychloroquine (PLAQUENIL) 200 mg tablet Take 200 mg by mouth 2 (two) times a day with meals      lidocaine (Lidoderm) 5 % Apply 1 patch topically over 12 hours daily Remove & Discard patch within 12 hours or as directed by MD 30 patch 0    lisinopril (ZESTRIL) 5 mg tablet TAKE 1 TABLET (5 MG TOTAL) BY MOUTH DAILY. 90 tablet 0    Norethin-Eth Estrad-Fe Biphas 1 MG-10 MCG / 10 MCG TABS Take 1 tablet by mouth in the morning 90 tablet 4    Qulipta 60 MG TABS       saccharomyces boulardii (FLORASTOR) 250 mg capsule Take 250 mg by mouth 2 (two) times a day      Ubrelvy 100 MG tablet Take 1 tablet (100 mg total) by mouth once as needed (migraines) for up to 1 dose 9 tablet 0    albuterol (PROVENTIL HFA,VENTOLIN HFA) 90 mcg/act inhaler TAKE 2 PUFFS BY MOUTH EVERY 6 HOURS AS NEEDED FOR WHEEZE OR FOR SHORTNESS OF BREATH (Patient not taking: Reported on 1/22/2025) 6.7 g 5    cetirizine  (ZyrTEC) 10 mg tablet Take 1 tablet (10 mg total) by mouth daily (Patient not taking: Reported on 1/22/2025) 30 tablet 0    DTx Yanira - Musculoskeletal ([facility]) MISC  (Patient not taking: Reported on 1/22/2025)      DULoxetine (CYMBALTA) 30 mg delayed release capsule Take 30 mg by mouth 2 (two) times a day (Patient not taking: Reported on 1/22/2025)      Melatonin 3 MG/4ML LIQD Take by mouth (Patient not taking: Reported on 1/22/2025)      Melatonin 5 MG TABS Take 3 mg by mouth (Patient not taking: Reported on 1/22/2025)      methylPREDNISolone 4 MG tablet therapy pack Use as directed on package (Patient not taking: Reported on 1/22/2025) 21 each 0    naproxen (Naprosyn) 500 mg tablet Take 1 tablet (500 mg total) by mouth 2 (two) times a day with meals (Patient not taking: Reported on 1/22/2025) 30 tablet 0    predniSONE 5 mg tablet  (Patient not taking: Reported on 1/22/2025)      tiZANidine (ZANAFLEX) 4 mg tablet Take 1 tablet (4 mg total) by mouth every 8 (eight) hours as needed for muscle spasms (Patient not taking: Reported on 1/22/2025) 30 tablet 0    vitamin B-12 (VITAMIN B-12) 1,000 mcg tablet Take by mouth daily (Patient not taking: Reported on 1/22/2025)      [DISCONTINUED] ondansetron (ZOFRAN) 4 mg tablet Take 1 tablet (4 mg total) by mouth every 6 (six) hours as needed for nausea or vomiting (Patient not taking: Reported on 1/22/2025) 30 tablet 0     No current facility-administered medications on file prior to visit.     Allergies   Allergen Reactions    Bee Venom Anaphylaxis and Hives    Amoxicillin Hives    Topamax [Topiramate] Delirium    Onion - Food Allergy Headache     Per RN, raw onions are a headache trigger    Per RN, raw onions are a headache trigger   Other reaction(s): Headache   Per RN, raw onions are no longer a headache trigger      Current Outpatient Medications on File Prior to Visit   Medication Sig Dispense Refill    ALPRAZolam (XANAX) 0.5 mg tablet Take 0.25 mg  by mouth if needed      Ascorbic Acid (vitamin C) 1000 MG tablet Take 1,000 mg by mouth daily      Benlysta 200 MG/ML SOAJ 200 mg Once a week      buPROPion (WELLBUTRIN XL) 150 mg 24 hr tablet TAKE 1 TABLET BY MOUTH DAILY WITH 300 MG TABLET      buPROPion (WELLBUTRIN XL) 300 mg 24 hr tablet Take 300 mg by mouth daily Tahe 450 mg daily      busPIRone (BUSPAR) 10 mg tablet Take 20 mg by mouth 2 (two) times a day      Cholecalciferol (Vitamin D) 50 MCG (2000 UT) tablet Take 2,000 Units by mouth daily      clindamycin-benzoyl peroxide (BENZACLIN) gel Apply topically 2 (two) times a day 25 g 0    clonazePAM (KlonoPIN) 0.5 mg tablet Take 0.5 mg by mouth 3 (three) times a day      Diclofenac Sodium (VOLTAREN) 1 % Apply 2 g topically 4 (four) times a day      DULoxetine (CYMBALTA) 60 mg delayed release capsule Take 2 capsules by mouth in the morning 120 mg taken evening      EPINEPHrine (EPIPEN) 0.3 mg/0.3 mL SOAJ Inject 0.3 mL (0.3 mg total) into a muscle once for 1 dose 0.6 mL 0    esomeprazole (NexIUM) 40 MG capsule TAKE 1 CAPSULE BY MOUTH 2 TIMES A DAY BEFORE MEALS. 180 capsule 1    fluticasone (FLONASE) 50 mcg/act nasal spray 1 spray into each nostril daily 16 g 0    gabapentin (NEURONTIN) 300 mg capsule Take 300 mg by mouth 2 (two) times a day Take 2 caps BID      hydroxychloroquine (PLAQUENIL) 200 mg tablet Take 200 mg by mouth 2 (two) times a day with meals      lidocaine (Lidoderm) 5 % Apply 1 patch topically over 12 hours daily Remove & Discard patch within 12 hours or as directed by MD 30 patch 0    lisinopril (ZESTRIL) 5 mg tablet TAKE 1 TABLET (5 MG TOTAL) BY MOUTH DAILY. 90 tablet 0    Norethin-Eth Estrad-Fe Biphas 1 MG-10 MCG / 10 MCG TABS Take 1 tablet by mouth in the morning 90 tablet 4    Qulipta 60 MG TABS       saccharomyces boulardii (FLORASTOR) 250 mg capsule Take 250 mg by mouth 2 (two) times a day      Ubrelvy 100 MG tablet Take 1 tablet (100 mg total) by mouth once as needed (migraines) for up to 1  dose 9 tablet 0    albuterol (PROVENTIL HFA,VENTOLIN HFA) 90 mcg/act inhaler TAKE 2 PUFFS BY MOUTH EVERY 6 HOURS AS NEEDED FOR WHEEZE OR FOR SHORTNESS OF BREATH (Patient not taking: Reported on 1/22/2025) 6.7 g 5    cetirizine (ZyrTEC) 10 mg tablet Take 1 tablet (10 mg total) by mouth daily (Patient not taking: Reported on 1/22/2025) 30 tablet 0    DTx Yanira - Musculoskeletal ([facility]) MISC  (Patient not taking: Reported on 1/22/2025)      DULoxetine (CYMBALTA) 30 mg delayed release capsule Take 30 mg by mouth 2 (two) times a day (Patient not taking: Reported on 1/22/2025)      Melatonin 3 MG/4ML LIQD Take by mouth (Patient not taking: Reported on 1/22/2025)      Melatonin 5 MG TABS Take 3 mg by mouth (Patient not taking: Reported on 1/22/2025)      methylPREDNISolone 4 MG tablet therapy pack Use as directed on package (Patient not taking: Reported on 1/22/2025) 21 each 0    naproxen (Naprosyn) 500 mg tablet Take 1 tablet (500 mg total) by mouth 2 (two) times a day with meals (Patient not taking: Reported on 1/22/2025) 30 tablet 0    predniSONE 5 mg tablet  (Patient not taking: Reported on 1/22/2025)      tiZANidine (ZANAFLEX) 4 mg tablet Take 1 tablet (4 mg total) by mouth every 8 (eight) hours as needed for muscle spasms (Patient not taking: Reported on 1/22/2025) 30 tablet 0    vitamin B-12 (VITAMIN B-12) 1,000 mcg tablet Take by mouth daily (Patient not taking: Reported on 1/22/2025)      [DISCONTINUED] ondansetron (ZOFRAN) 4 mg tablet Take 1 tablet (4 mg total) by mouth every 6 (six) hours as needed for nausea or vomiting (Patient not taking: Reported on 1/22/2025) 30 tablet 0     No current facility-administered medications on file prior to visit.      Social History     Tobacco Use    Smoking status: Never     Passive exposure: Never    Smokeless tobacco: Never   Vaping Use    Vaping status: Never Used   Substance and Sexual Activity    Alcohol use: Not Currently    Drug use: Not  "Currently     Types: Marijuana    Sexual activity: Yes        Objective   Pulse 80   Temp (!) 97 °F (36.1 °C) (Temporal)   Ht 5' 6.5\" (1.689 m)   Wt 96.9 kg (213 lb 9.6 oz)   SpO2 99%   BMI 33.96 kg/m²      Physical Exam  Vitals reviewed.   Constitutional:       Appearance: Normal appearance.   HENT:      Head: Normocephalic and atraumatic.   Eyes:      Extraocular Movements: Extraocular movements intact.      Pupils: Pupils are equal, round, and reactive to light.   Cardiovascular:      Rate and Rhythm: Normal rate and regular rhythm.   Abdominal:      General: Bowel sounds are normal. There is no distension.      Palpations: Abdomen is soft. There is no mass.      Tenderness: There is abdominal tenderness.   Skin:     General: Skin is warm and dry.      Coloration: Skin is not jaundiced.   Neurological:      General: No focal deficit present.      Mental Status: She is alert and oriented to person, place, and time.           "

## 2025-01-22 NOTE — PATIENT INSTRUCTIONS
Scheduled date of EGD/colonoscopy (as of today):2/7/25  Physician performing EGD/colonoscopy:Timo  Location of EGD/colonoscopy:[place]  Desired bowel prep reviewed with patient:Suprep  Instructions reviewed with patient by:Oswaldo coates  Clearances:  none   Workup is going to include labs, stool testing, EGD and Colonoscopy  We are working to rule out infectious source of symptoms to include C.Diff, inflammatory bowel disease - Crohns disease and ulcerative colitis as well as celiac disease    If all of this comes back without findings we will proceed to a gastric emptying study which is an Xray to evaluate for gastroparesis    For now I would recommend continuing Nexium daily and using Ondansetron as needed for nausea and Dicyclomine as needed for stomach pain.

## 2025-01-23 ENCOUNTER — TELEPHONE (OUTPATIENT)
Dept: NEUROLOGY | Facility: CLINIC | Age: 35
End: 2025-01-23

## 2025-01-23 NOTE — TELEPHONE ENCOUNTER
LMOM to confirm patient's appointment on 1/28  with Viet. Reminded pt to arrive 15 min prior to allow enough time for check in.

## 2025-01-24 ENCOUNTER — TELEPHONE (OUTPATIENT)
Dept: PREADMISSION TESTING | Facility: HOSPITAL | Age: 35
End: 2025-01-24

## 2025-01-27 LAB
APOB+LDLR+PCSK9 GENE MUT ANL BLD/T: NOT DETECTED
BRCA1+BRCA2 DEL+DUP + FULL MUT ANL BLD/T: NOT DETECTED
MLH1+MSH2+MSH6+PMS2 GN DEL+DUP+FUL M: NOT DETECTED

## 2025-01-27 NOTE — TELEPHONE ENCOUNTER
Called pt to advise appt with Viet needed to be canceled. Pt can only be scheduled with attending (headache specialist) due to extensive headaches and imaging. No answer. LMOM and call back phone number.      Pt is already on the wait list just in case something sooner becomes available.

## 2025-01-29 DIAGNOSIS — R10.10 PAIN OF UPPER ABDOMEN: ICD-10-CM

## 2025-01-29 DIAGNOSIS — R11.0 NAUSEA: ICD-10-CM

## 2025-01-29 RX ORDER — DICYCLOMINE HCL 20 MG
TABLET ORAL
Qty: 360 TABLET | Refills: 1 | Status: SHIPPED | OUTPATIENT
Start: 2025-01-29

## 2025-01-29 NOTE — TELEPHONE ENCOUNTER
Pt returned call. Pt has prep instructions and do not want to reschedule. Pt states the latest time PAT is available to call would be best. Pt states she does get nausea and vomiting from the anesthesia.

## 2025-01-30 ENCOUNTER — TELEPHONE (OUTPATIENT)
Dept: PSYCHIATRY | Facility: CLINIC | Age: 35
End: 2025-01-30

## 2025-02-07 ENCOUNTER — HOSPITAL ENCOUNTER (OUTPATIENT)
Dept: GASTROENTEROLOGY | Facility: HOSPITAL | Age: 35
Setting detail: OUTPATIENT SURGERY
End: 2025-02-07
Payer: COMMERCIAL

## 2025-02-07 ENCOUNTER — ANESTHESIA EVENT (OUTPATIENT)
Dept: GASTROENTEROLOGY | Facility: HOSPITAL | Age: 35
End: 2025-02-07
Payer: COMMERCIAL

## 2025-02-07 ENCOUNTER — ANESTHESIA (OUTPATIENT)
Dept: GASTROENTEROLOGY | Facility: HOSPITAL | Age: 35
End: 2025-02-07
Payer: COMMERCIAL

## 2025-02-07 VITALS
RESPIRATION RATE: 20 BRPM | BODY MASS INDEX: 33.84 KG/M2 | SYSTOLIC BLOOD PRESSURE: 121 MMHG | TEMPERATURE: 98 F | WEIGHT: 210.54 LBS | DIASTOLIC BLOOD PRESSURE: 70 MMHG | OXYGEN SATURATION: 99 % | HEIGHT: 66 IN | HEART RATE: 72 BPM

## 2025-02-07 DIAGNOSIS — K21.9 GASTROESOPHAGEAL REFLUX DISEASE, UNSPECIFIED WHETHER ESOPHAGITIS PRESENT: ICD-10-CM

## 2025-02-07 DIAGNOSIS — R10.10 PAIN OF UPPER ABDOMEN: ICD-10-CM

## 2025-02-07 DIAGNOSIS — R19.7 DIARRHEA OF PRESUMED INFECTIOUS ORIGIN: ICD-10-CM

## 2025-02-07 DIAGNOSIS — R63.4 WEIGHT LOSS, ABNORMAL: ICD-10-CM

## 2025-02-07 DIAGNOSIS — Z80.0 FAMILY HISTORY OF COLON CANCER: ICD-10-CM

## 2025-02-07 PROBLEM — E66.811 OBESITY, CLASS I, BMI 30-34.9: Status: ACTIVE | Noted: 2025-02-07

## 2025-02-07 LAB
EXT PREGNANCY TEST URINE: NEGATIVE
EXT. CONTROL: NORMAL

## 2025-02-07 PROCEDURE — 43239 EGD BIOPSY SINGLE/MULTIPLE: CPT | Performed by: INTERNAL MEDICINE

## 2025-02-07 PROCEDURE — 45380 COLONOSCOPY AND BIOPSY: CPT | Performed by: INTERNAL MEDICINE

## 2025-02-07 PROCEDURE — 81025 URINE PREGNANCY TEST: CPT | Performed by: ANESTHESIOLOGY

## 2025-02-07 PROCEDURE — 88305 TISSUE EXAM BY PATHOLOGIST: CPT | Performed by: PATHOLOGY

## 2025-02-07 RX ORDER — SODIUM CHLORIDE, SODIUM LACTATE, POTASSIUM CHLORIDE, CALCIUM CHLORIDE 600; 310; 30; 20 MG/100ML; MG/100ML; MG/100ML; MG/100ML
50 INJECTION, SOLUTION INTRAVENOUS CONTINUOUS
Status: DISCONTINUED | OUTPATIENT
Start: 2025-02-07 | End: 2025-02-11 | Stop reason: HOSPADM

## 2025-02-07 RX ORDER — SODIUM CHLORIDE, SODIUM LACTATE, POTASSIUM CHLORIDE, CALCIUM CHLORIDE 600; 310; 30; 20 MG/100ML; MG/100ML; MG/100ML; MG/100ML
50 INJECTION, SOLUTION INTRAVENOUS CONTINUOUS
Status: CANCELLED | OUTPATIENT
Start: 2025-02-07

## 2025-02-07 RX ORDER — LIDOCAINE HYDROCHLORIDE 10 MG/ML
0.5 INJECTION, SOLUTION EPIDURAL; INFILTRATION; INTRACAUDAL; PERINEURAL ONCE AS NEEDED
Status: DISCONTINUED | OUTPATIENT
Start: 2025-02-07 | End: 2025-02-11 | Stop reason: HOSPADM

## 2025-02-07 RX ORDER — LIDOCAINE HYDROCHLORIDE 20 MG/ML
INJECTION, SOLUTION EPIDURAL; INFILTRATION; INTRACAUDAL; PERINEURAL AS NEEDED
Status: DISCONTINUED | OUTPATIENT
Start: 2025-02-07 | End: 2025-02-07

## 2025-02-07 RX ORDER — SODIUM CHLORIDE, SODIUM LACTATE, POTASSIUM CHLORIDE, CALCIUM CHLORIDE 600; 310; 30; 20 MG/100ML; MG/100ML; MG/100ML; MG/100ML
INJECTION, SOLUTION INTRAVENOUS CONTINUOUS PRN
Status: DISCONTINUED | OUTPATIENT
Start: 2025-02-07 | End: 2025-02-07

## 2025-02-07 RX ORDER — LIDOCAINE HYDROCHLORIDE 10 MG/ML
0.5 INJECTION, SOLUTION EPIDURAL; INFILTRATION; INTRACAUDAL; PERINEURAL ONCE AS NEEDED
Status: CANCELLED | OUTPATIENT
Start: 2025-02-07

## 2025-02-07 RX ORDER — PROPOFOL 10 MG/ML
INJECTION, EMULSION INTRAVENOUS AS NEEDED
Status: DISCONTINUED | OUTPATIENT
Start: 2025-02-07 | End: 2025-02-07

## 2025-02-07 RX ADMIN — PROPOFOL 50 MG: 10 INJECTION, EMULSION INTRAVENOUS at 09:37

## 2025-02-07 RX ADMIN — LIDOCAINE HYDROCHLORIDE 100 MG: 20 INJECTION, SOLUTION EPIDURAL; INFILTRATION; INTRACAUDAL; PERINEURAL at 09:35

## 2025-02-07 RX ADMIN — SODIUM CHLORIDE, SODIUM LACTATE, POTASSIUM CHLORIDE, AND CALCIUM CHLORIDE: .6; .31; .03; .02 INJECTION, SOLUTION INTRAVENOUS at 09:33

## 2025-02-07 RX ADMIN — PROPOFOL 250 MG: 10 INJECTION, EMULSION INTRAVENOUS at 09:35

## 2025-02-07 RX ADMIN — PROPOFOL 50 MG: 10 INJECTION, EMULSION INTRAVENOUS at 09:45

## 2025-02-07 RX ADMIN — PROPOFOL 50 MG: 10 INJECTION, EMULSION INTRAVENOUS at 09:41

## 2025-02-07 NOTE — INTERVAL H&P NOTE
H&P reviewed. After examining the patient I find no changes in the patients condition since the H&P had been written.    Vitals:    02/07/25 0918   BP: 120/67   Pulse: 82   Resp: 16   Temp: 97.5 °F (36.4 °C)   SpO2: 99%

## 2025-02-07 NOTE — ANESTHESIA PREPROCEDURE EVALUATION
Procedure:  COLONOSCOPY  EGD    Relevant Problems   CARDIO   (+) Hypertension   (+) Migraines      GI/HEPATIC   (+) Gastroesophageal reflux disease without esophagitis      NEURO/PSYCH   (+) Anxiety   (+) Depression   (+) Migraines      Other   (+) Obesity, Class I, BMI 30-34.9   Hx of PONV    Cormack I with Mac 3 on 7/5/23    Denies recent fever, cough or other symptom of upper respiratory tract infection.    Confirmed NPO appropriate      Physical Exam    Airway    Mallampati score: III  TM Distance: >3 FB  Neck ROM: full     Dental   No notable dental hx     Cardiovascular      Pulmonary      Other Findings  post-pubertal.      Anesthesia Plan  ASA Score- 2     Anesthesia Type- IV sedation with anesthesia with ASA Monitors.         Additional Monitors:     Airway Plan:            Plan Factors-Exercise tolerance (METS): >4 METS.    Chart reviewed.   Existing labs reviewed.     Patient is not a current smoker.  Patient did not smoke on day of surgery.            Induction- intravenous.    Postoperative Plan-         Informed Consent- Anesthetic plan and risks discussed with patient.        NPO Status:  No vitals data found for the desired time range.

## 2025-02-07 NOTE — ANESTHESIA POSTPROCEDURE EVALUATION
Post-Op Assessment Note    CV Status:  Stable  Pain Score: 0    Pain management: adequate       Mental Status:  Alert   Hydration Status:  Euvolemic   PONV Controlled:  None   Airway Patency:  Patent     Post Op Vitals Reviewed: Yes    No anethesia notable event occurred.    Staff: CRNA           Last Filed PACU Vitals:  Vitals Value Taken Time   Temp 97.9    Pulse 92 02/07/25 0954   /60 02/07/25 0954   Resp 12 02/07/25 0954   SpO2 98 % 02/07/25 0954       Modified Norris:     Vitals Value Taken Time   Activity 2 02/07/25 0954   Respiration 2 02/07/25 0954   Circulation 2 02/07/25 0954   Consciousness 2 02/07/25 0954   Oxygen Saturation 2 02/07/25 0954     Modified Norris Score: 10

## 2025-02-11 ENCOUNTER — RESULTS FOLLOW-UP (OUTPATIENT)
Dept: GASTROENTEROLOGY | Facility: CLINIC | Age: 35
End: 2025-02-11

## 2025-02-11 PROCEDURE — 88305 TISSUE EXAM BY PATHOLOGIST: CPT | Performed by: PATHOLOGY

## 2025-02-24 ENCOUNTER — TELEPHONE (OUTPATIENT)
Dept: PSYCHIATRY | Facility: CLINIC | Age: 35
End: 2025-02-24

## 2025-02-24 NOTE — TELEPHONE ENCOUNTER
Called patient but had to leave voice mail message to sign consents that were sent via [employer], [employer] Consent & all NP Consents, told where to obtain consents.    Left message must be signed ahead of appointment.  Left Call Back # [phone].

## 2025-02-25 ENCOUNTER — TELEMEDICINE (OUTPATIENT)
Dept: PSYCHIATRY | Facility: CLINIC | Age: 35
End: 2025-02-25
Payer: COMMERCIAL

## 2025-02-25 DIAGNOSIS — F41.1 GENERALIZED ANXIETY DISORDER: ICD-10-CM

## 2025-02-25 DIAGNOSIS — F33.2 MAJOR DEPRESSIVE DISORDER, RECURRENT SEVERE WITHOUT PSYCHOTIC FEATURES (HCC): Primary | ICD-10-CM

## 2025-02-25 PROCEDURE — 90792 PSYCH DIAG EVAL W/MED SRVCS: CPT | Performed by: STUDENT IN AN ORGANIZED HEALTH CARE EDUCATION/TRAINING PROGRAM

## 2025-02-25 RX ORDER — ALPRAZOLAM 0.5 MG
0.25 TABLET ORAL AS NEEDED
Qty: 5 TABLET | Refills: 1 | Status: SHIPPED | OUTPATIENT
Start: 2025-02-25

## 2025-02-25 RX ORDER — BUPROPION HYDROCHLORIDE 150 MG/1
150 TABLET ORAL EVERY MORNING
Qty: 90 TABLET | Refills: 0 | Status: SHIPPED | OUTPATIENT
Start: 2025-02-25

## 2025-02-25 RX ORDER — BUSPIRONE HYDROCHLORIDE 10 MG/1
20 TABLET ORAL 2 TIMES DAILY
Qty: 360 TABLET | Refills: 0 | Status: SHIPPED | OUTPATIENT
Start: 2025-02-25

## 2025-02-25 RX ORDER — DULOXETIN HYDROCHLORIDE 60 MG/1
120 CAPSULE, DELAYED RELEASE ORAL DAILY
Qty: 180 CAPSULE | Refills: 0 | Status: SHIPPED | OUTPATIENT
Start: 2025-02-25

## 2025-02-25 RX ORDER — BUPROPION HYDROCHLORIDE 300 MG/1
300 TABLET ORAL DAILY
Qty: 90 TABLET | Refills: 0 | Status: SHIPPED | OUTPATIENT
Start: 2025-02-25

## 2025-02-25 RX ORDER — MIRTAZAPINE 7.5 MG/1
7.5 TABLET, FILM COATED ORAL
Qty: 30 TABLET | Refills: 1 | Status: SHIPPED | OUTPATIENT
Start: 2025-02-25

## 2025-02-25 RX ORDER — CLONAZEPAM 0.5 MG/1
0.5 TABLET ORAL 3 TIMES DAILY
Qty: 90 TABLET | Refills: 1 | Status: SHIPPED | OUTPATIENT
Start: 2025-02-25

## 2025-02-25 NOTE — ASSESSMENT & PLAN NOTE
The patient has a longstanding history of severe episodes of depression which have been recurring, prolonged, and persistent at times.  Her symptoms appear to be consistent with major depressive disorder.  She has been on medication management for many years with overall benefit, though continued residual symptoms of depression.  She currently denies any SI, HI, or AVH.  She does not demonstrate an imminent danger to her safety or to the safety of others at this time.  Among her concerns are her difficulties with sleep and appetite.    Start Remeron 7.5 mg nightly.  Continue Cymbalta 120 mg daily.  Continue Wellbutrin 150 mg daily.  Continue BuSpar 20 mg twice daily.  Continue Klonopin 0.75 mg twice daily.  Reviewed PDMP, patient has been feeling this appropriately.    Patient has been managed additionally with Xanax 0.25 mg as needed for panic attacks.  She uses this roughly weekly.  Per PDMP, she does not require frequent fills of this.  We will continue at this time, though patient has been advised of goal to minimize benzodiazepine use and his symptoms improve.    Patient has attempted therapy in the past on several occasions and has not found benefit with this.  Patient currently not interested in pursuing psychotherapy.

## 2025-02-25 NOTE — ASSESSMENT & PLAN NOTE
The patient presents with symptoms of generalized anxiety disorder.  She has had longstanding chronic anxiety noted by excessive daily worry with difficulty calming this worry.  She does have numerous stressors currently including her daughter's health, issues with her house, and finances.  Symptoms have remained persistent and cause interference with her functioning.  We will adjust medications as above to address her anxiety.    See plan for major depressive disorder.

## 2025-02-25 NOTE — BH TREATMENT PLAN
TREATMENT PLAN (Medication Management Only)        [facility]    Name and Date of Birth:  Jia Armstrong 35 y.o. 1990  Date of Treatment Plan: February 25, 2025  Diagnosis/Diagnoses:    1. Major depressive disorder, recurrent severe without psychotic features (HCC)    2. Generalized anxiety disorder      Strengths/Personal Resources for Self-Care: supportive family, taking medications as prescribed, ability to adapt to life changes, ability to communicate well, ability to listen, ability to reason, ability to understand psychiatric illness, good understanding of illness, independence, motivation for treatment, being resoureceful, willingness to work on problems.  Area/Areas of need (in own words): anxiety symptoms, depressive symptoms, sleep problems  1. Long Term Goal: improve control of depression.  Target Date:6 months - 8/25/2025  Person/Persons responsible for completion of goal: Jia  2.  Short Term Objective (s) - How will we reach this goal?:   A. Provider new recommended medication/dosage changes and/or continue medication(s):  Will continue Wellbutrin, Buspar, Cymbalta, Klonopin, Xanax, and start Remeron .  B.  Currently not interested in pursuing talk therapy due to lack of benefit in past .  C.  Will work towards minimizing life stressors .  Target Date:6 months - 8/25/2025  Person/Persons Responsible for Completion of Goal: Jia  Progress Towards Goals: starting treatment  Treatment Modality: medication management every 4 weeks  Review due 180 days from date of this plan: 6 months - 8/25/2025  Expected length of service: ongoing treatment  My Physician/PA/NP and I have developed this plan together and I agree to work on the goals and objectives. I understand the treatment goals that were developed for my treatment.

## 2025-02-25 NOTE — PSYCH
PSYCHIATRIC EVALUATION     [facility]    Virtual Visit    Patient is located at Other in the following state in which I hold an active license [place].    Provider located in [place].    The patient was identified by name and date of birth. Jia Armstrong was informed that this is a telemedicine visit and that the visit is being conducted through the Epic Embedded platform. She agrees to proceed. My office door was closed. No one else was in the room. She acknowledged consent and understanding of privacy and security of the video platform. The patient has agreed to participate and understands they can discontinue the visit at any time. Patient is aware this is a billable service.     Name and Date of Birth:  Jia Armstrong 35 y.o. 1990 MRN: [id number]    Insurance: Payor: ALAYNA / Plan: [facility] HEALTH BENEFIT PLAN / Product Type: Fee for Service Commercial /      Date of Visit: February 25, 2025    Reason for visit:   Chief Complaint   Patient presents with    Depression    Anxiety    Establish Care       Assessment & Plan  Major depressive disorder, recurrent severe without psychotic features (HCC)  The patient has a longstanding history of severe episodes of depression which have been recurring, prolonged, and persistent at times.  Her symptoms appear to be consistent with major depressive disorder.  She has been on medication management for many years with overall benefit, though continued residual symptoms of depression.  She currently denies any SI, HI, or AVH.  She does not demonstrate an imminent danger to her safety or to the safety of others at this time.  Among her concerns are her difficulties with sleep and appetite.    Start Remeron 7.5 mg nightly.  Continue Cymbalta 120 mg daily.  Continue Wellbutrin 150 mg daily.  Continue BuSpar 20 mg twice daily.  Continue Klonopin 0.75 mg twice daily.  Reviewed PDMP, patient has been feeling this  appropriately.    Patient has been managed additionally with Xanax 0.25 mg as needed for panic attacks.  She uses this roughly weekly.  Per PDMP, she does not require frequent fills of this.  We will continue at this time, though patient has been advised of goal to minimize benzodiazepine use and his symptoms improve.    Patient has attempted therapy in the past on several occasions and has not found benefit with this.  Patient currently not interested in pursuing psychotherapy.    Orders:    DULoxetine (CYMBALTA) 60 mg delayed release capsule; Take 2 capsules (120 mg total) by mouth in the morning 120 mg taken evening    buPROPion (WELLBUTRIN XL) 300 mg 24 hr tablet; Take 1 tablet (300 mg total) by mouth daily Tahe 450 mg daily    buPROPion (WELLBUTRIN XL) 150 mg 24 hr tablet; Take 1 tablet (150 mg total) by mouth every morning To be taken with 300 mg tab for total of 450 mg daily    mirtazapine (REMERON) 7.5 MG tablet; Take 1 tablet (7.5 mg total) by mouth daily at bedtime    Generalized anxiety disorder  The patient presents with symptoms of generalized anxiety disorder.  She has had longstanding chronic anxiety noted by excessive daily worry with difficulty calming this worry.  She does have numerous stressors currently including her daughter's health, issues with her house, and finances.  Symptoms have remained persistent and cause interference with her functioning.  We will adjust medications as above to address her anxiety.    See plan for major depressive disorder.    Orders:    clonazePAM (KlonoPIN) 0.5 mg tablet; Take 1 tablet (0.5 mg total) by mouth 3 (three) times a day    busPIRone (BUSPAR) 10 mg tablet; Take 2 tablets (20 mg total) by mouth 2 (two) times a day    ALPRAZolam (XANAX) 0.5 mg tablet; Take 0.5 tablets (0.25 mg total) by mouth if needed for anxiety         Treatment Recommendations/Precautions:    Medication changes: We will continue Cymbalta 120 mg daily, BuSpar 20 mg twice daily,  Wellbutrin 450 mg daily, Klonopin 0.75 mg twice daily, and start Remeron 7.5 mg nightly.  Patient to continue Xanax 0.25 milligrams as needed for panic attacks, patient uses roughly once per week.  Aware of 24 hour and weekend coverage for urgent situations accessed by calling [facility] main practice number  Does not want to see a therapist  I am scheduling this patient out for greater than 3 months: No    Medications Risks/Benefits:      Risks, Benefits And Possible Side Effects Of Medications:    Risks, benefits, and possible side effects of medications explained to Jia and she verbalizes understanding and agreement for treatment.    Controlled Medication Discussion:     Jia has been filling controlled prescriptions on time as prescribed according to [place] Prescription Drug Monitoring Program    HPI     Jia is a 35 y.o. female with a history of depression and anxiety who presents for psychiatric evaluation due to depressive symptoms, anxiety symptoms, and for psychiatric medication management.    The patient presents as pleasant and cooperative throughout encounter.  She is presenting today to establish care for management of depression and anxiety as above.  She reports that she has a long standing history of depression and anxiety.  She notes that she suffered from chronic medical issues since childhood with very frequent migraines which resulted in very frequent instances of having to go home from school.  The patient missed a substantial amount of school throughout her childhood and adolescence and required numerous accommodations in order to catch up including teachers going to her home to do at home lessons.  The patient started struggling more with depression in seventh grade and was started on antidepressants by her neurologist at the time.  She has been on medications since then.  She notes that depressive symptoms have been severe at times and explains that when her  daughter was born, she had undiagnosed common variable immune deficiency and had been frequently inconsolable for the first 2 years of life before she was diagnosed.  The patient felt very isolated and alone, though states that she spoke with her  about this and he did make changes in his life to be more present to help provide support, which did help.  Depression did improve once the patient's daughter was diagnosed with, variable immune deficiency after she kept pushing for further workup, though she states that the process of her daughter receiving her diagnosis and the initial treatment induction had been extremely challenging.  During severe episodes of depression, the patient has had suicidal thoughts, though described these as passive death wishes without plan or intent to harm herself.  She states that she has been on numerous antidepressants over the course of her life including Zoloft, Lexapro, Effexor, Cymbalta, Pristiq, Wellbutrin, trazodone, amitriptyline, nortriptyline.  She is currently managed on Cymbalta and has been titrated to 120 mg daily.  Cymbalta was switched from Lexapro to to improve benefit with regard to her chronic pain issues.  The patient feels that there has been some overall benefit, though continues to struggle with depressive symptoms.    The patient reports that she currently is having episodes of low mood, decreased interest, poor sleep and low energy.  She does have intermittent feelings of guilt and trouble with concentration.  She denies any SI, HI, or AVH at this time.  She denies any history of nonsuicidal self-injury.  She denies history of suicide attempts.  She denies any history consistent with meme including decreased need for sleep with excessively elevated mood or energy.  The patient does not demonstrate any paranoia or delusional thought content during interview.    Regarding anxiety, the patient reports constant worry about things in the future and things  that need to be done.  She notes that she frequently has racing thoughts that interfere with sleep.  She reports that she experiences worsening physical symptoms and this was a longstanding issue that resulted in her missing school, which made her more anxious.  She reports a history of panic attacks.  She notes that she has had increased anxiety recently after she moved from [place] to [place] and started noticing numerous problems in her house and projects that need to be done.  She reports that finances have been a significant stressor as well.  The patient reports that she has been on Klonopin for the past 6 years at the same dose of 0.75 mg twice daily.  She states that she has also been given a small supply of Xanax to help as needed for panic attacks that she uses roughly weekly.  She continues to have a lot of anxiety regarding her daughter's health and states that her daughter has numerous specialists that have been involved in her care.    The patient reports that she does have good support from her .  She lives with her  and daughter.  She is currently on permanent disability due to her medical issues.  She denies any suicidal thoughts, plan, intent.  She denies any thoughts to harm others.  She is able to appropriately plan for safety.  She does feel that medication management has been overall helpful, though has not had any benefit with prior times of therapy.  On discussion of medications, as she has had significant issues with appetite and sleep, patient is agreeable to trial of low-dose of Remeron to augment her current medication regimen as she does have continued feelings of depression.    HPI ROS Appetite Changes and Sleep:     She reports decreased sleep, decreased appetite, decreased energy    Current Rating Scores:     Current PHQ-9   PHQ-2/9 Depression Screening    Little interest or pleasure in doing things: 1 - several days  Feeling down, depressed, or hopeless: 1 -  several days  Trouble falling or staying asleep, or sleeping too much: 3 - nearly every day  Feeling tired or having little energy: 3 - nearly every day  Poor appetite or overeating: 3 - nearly every day  Feeling bad about yourself - or that you are a failure or have let yourself or your family down: 2 - more than half the days  Trouble concentrating on things, such as reading the newspaper or watching television: 1 - several days  Moving or speaking so slowly that other people could have noticed. Or the opposite - being so fidgety or restless that you have been moving around a lot more than usual: 0 - not at all  Thoughts that you would be better off dead, or of hurting yourself in some way: 0 - not at all  PHQ-9 Score: 14  PHQ-9 Interpretation: Moderate depression       Current SHERLY-7 is   SHERLY-7 Flowsheet Screening      Flowsheet Row Most Recent Value   Over the last two weeks, how often have you been bothered by the following problems?     Feeling nervous, anxious, or on edge 2   Not being able to stop or control worrying 1   Worrying too much about different things 3   Trouble relaxing  1   Being so restless that it's hard to sit still 0   Becoming easily annoyed or irritable  1   Feeling afraid as if something awful might happen 0   How difficult have these problems made it for you to do your work, take care of things at home, or get along with other people?  Somewhat difficult   SHERLY Score  8        .    Psychiatric Review Of Systems:    Sleep changes: decreased  Appetite changes: decreased  Weight changes: decreased  Energy/anergy: decreased  Interest/pleasure/anhedonia: no  Somatic symptoms: yes  Anxiety/panic: yes  Madeline: no  Guilty/hopeless: no  Self injurious behavior/risky behavior: no  Suicidal ideation: no  Homicidal ideation: no  Auditory hallucinations: no  Visual hallucinations: no  Other hallucinations: no  Delusional thinking: no  Eating disorder history: no  Obsessive/compulsive symptoms:  mild  compulsatory behavior of having things face the same way    Review Of Systems:    Mood anxiety and depression   Behavior appropriate, cooperative, and calm   Thought Content daily anxiety feelings   General sleep disturbances and appetite disturbances   Personality normal   Other Psych Symptoms normal   Constitutional negative   ENT negative   Cardiovascular negative   Respiratory negative   Gastrointestinal negative   Genitourinary negative   Musculoskeletal negative   Integumentary negative   Neurological negative   Endocrine negative   Other Symptoms none, all other systems are negative       Past Psychiatric History:     Past Inpatient Psychiatric Treatment:   No history of past inpatient psychiatric admissions  Past Outpatient Psychiatric Treatment:    Longstanding outpatient psychiatric management, Dr. Taras Walker for 10 years, recently switched to see Dr. Peggy Kemp after moving to [place]  Past Suicide Attempts: no  Past Violent Behavior: no  Past Psychiatric Medication Trials: Zoloft, Lexapro, Effexor, Cymbalta, Pristiq, Wellbutrin, Trazodone, Amitriptyline/Elavil, Nortriptyline/Pamelor, Neurontin, Abilify, Buspar, Klonopin, and Xanax    Traumatic History:     Abuse: no history of physical or sexual abuse  Other Traumatic Events:  Traumatized from being in a hotel with a bed bug infestation.      Family Psychiatric History:     Patient reports that her mother and father both have depression. She has numerous extended family members with depression and anxiety including multiple aunts, uncles, and cousins. She reports that her cousin struggles with substance abuse. She reports that her uncle and cousin had both attempted suicide.     Family History   Problem Relation Age of Onset    Breast cancer Mother     Colon cancer Father     Colon cancer Paternal Uncle        Substance Use History:    Alcohol use: occasional, social use  Recreational drug use:   Cocaine:  denies use  Heroin:  denies use  Marijuana:   Used medical marijuana while in [place]  Other drugs: denies use   Longest clean time: not applicable  History of Inpatient/Outpatient rehabilitation program: no  Smoking history: denies use    Social History     Substance and Sexual Activity   Alcohol Use Not Currently     Social History     Substance and Sexual Activity   Drug Use Not Currently    Types: Marijuana       Social History:    Education: high school graduate  Learning Disabilities:  initial reading disability for which she had school accommodations at the time   Marital History:   Children: 1 daughter  Living Arrangement: lives in home with  and daughter  Occupational History: on permanent disability due to severe migraines  Functioning Relationships: good support system  Legal History: none   History: None    Social History     Socioeconomic History    Marital status: /Civil Union     Spouse name: Not on file    Number of children: Not on file    Years of education: Not on file    Highest education level: Not on file   Occupational History    Not on file   Tobacco Use    Smoking status: Never     Passive exposure: Never    Smokeless tobacco: Never   Vaping Use    Vaping status: Never Used   Substance and Sexual Activity    Alcohol use: Not Currently    Drug use: Not Currently     Types: Marijuana    Sexual activity: Yes   Other Topics Concern    Not on file   Social History Narrative    Not on file     Social Drivers of Health     Financial Resource Strain: Not on file   Food Insecurity: No Food Insecurity (12/12/2023)    [facility] [employer] [employer]    Food Insecurity     Ran out of Food: Not on file     Worried About Running out of Food: Not on file   Transportation Needs: Not on file   Physical Activity: Not on file   Stress: Not on file   Social Connections: Not on file   Intimate Partner Violence: Not on file (1/5/2024)   Housing Stability: Not on file       Past Medical History:    Past Medical  History:   Diagnosis Date    Anxiety     Depression     Hypertension     Insomnia     Lupus     Migraines         Past Surgical History:   Procedure Laterality Date    APPENDECTOMY      around 2023    OCCIPITAL NEURECTOMY      2023, occipital nerve cut as migraine treatment    REDUCTION MAMMAPLASTY  2015     Allergies   Allergen Reactions    Bee Venom Anaphylaxis and Hives    Amoxicillin Hives    Topamax [Topiramate] Delirium    Onion - Food Allergy Headache     Per RN, raw onions are a headache trigger    Per RN, raw onions are a headache trigger   Other reaction(s): Headache   Per RN, raw onions are no longer a headache trigger       Current Outpatient Medications:    Current Outpatient Medications   Medication Sig Dispense Refill    ALPRAZolam (XANAX) 0.5 mg tablet Take 0.5 tablets (0.25 mg total) by mouth if needed for anxiety 5 tablet 1    buPROPion (WELLBUTRIN XL) 150 mg 24 hr tablet Take 1 tablet (150 mg total) by mouth every morning To be taken with 300 mg tab for total of 450 mg daily 90 tablet 0    buPROPion (WELLBUTRIN XL) 300 mg 24 hr tablet Take 1 tablet (300 mg total) by mouth daily Tahe 450 mg daily 90 tablet 0    busPIRone (BUSPAR) 10 mg tablet Take 2 tablets (20 mg total) by mouth 2 (two) times a day 360 tablet 0    clonazePAM (KlonoPIN) 0.5 mg tablet Take 1 tablet (0.5 mg total) by mouth 3 (three) times a day 90 tablet 1    DULoxetine (CYMBALTA) 60 mg delayed release capsule Take 2 capsules (120 mg total) by mouth in the morning 120 mg taken evening 180 capsule 0    mirtazapine (REMERON) 7.5 MG tablet Take 1 tablet (7.5 mg total) by mouth daily at bedtime 30 tablet 1    albuterol (PROVENTIL HFA,VENTOLIN HFA) 90 mcg/act inhaler TAKE 2 PUFFS BY MOUTH EVERY 6 HOURS AS NEEDED FOR WHEEZE OR FOR SHORTNESS OF BREATH (Patient not taking: Reported on 1/22/2025) 6.7 g 5    Ascorbic Acid (vitamin C) 1000 MG tablet Take 1,000 mg by mouth daily      Benlysta 200 MG/ML SOAJ 200 mg Once a week      cetirizine  (ZyrTEC) 10 mg tablet Take 1 tablet (10 mg total) by mouth daily (Patient not taking: Reported on 1/22/2025) 30 tablet 0    Cholecalciferol (Vitamin D) 50 MCG (2000 UT) tablet Take 2,000 Units by mouth daily      clindamycin-benzoyl peroxide (BENZACLIN) gel Apply topically 2 (two) times a day 25 g 0    Diclofenac Sodium (VOLTAREN) 1 % Apply 2 g topically 4 (four) times a day      dicyclomine (BENTYL) 20 mg tablet TAKE 1 TABLET (20 MG TOTAL) BY MOUTH EVERY 6 HOURS AS NEEDED FOR ABDOMINAL PAIN 360 tablet 1    DTx [facility] - Musculoskeletal (formerly [facility]) MISC  (Patient not taking: Reported on 1/22/2025)      EPINEPHrine (EPIPEN) 0.3 mg/0.3 mL SOAJ Inject 0.3 mL (0.3 mg total) into a muscle once for 1 dose 0.6 mL 0    esomeprazole (NexIUM) 40 MG capsule TAKE 1 CAPSULE BY MOUTH 2 TIMES A DAY BEFORE MEALS. 180 capsule 1    fluticasone (FLONASE) 50 mcg/act nasal spray 1 spray into each nostril daily 16 g 0    gabapentin (NEURONTIN) 300 mg capsule Take 300 mg by mouth 2 (two) times a day Take 2 caps BID      hydroxychloroquine (PLAQUENIL) 200 mg tablet Take 200 mg by mouth 2 (two) times a day with meals      lidocaine (Lidoderm) 5 % Apply 1 patch topically over 12 hours daily Remove & Discard patch within 12 hours or as directed by MD 30 patch 0    lisinopril (ZESTRIL) 5 mg tablet TAKE 1 TABLET (5 MG TOTAL) BY MOUTH DAILY. 90 tablet 0    Melatonin 3 MG/4ML LIQD Take by mouth (Patient not taking: Reported on 1/22/2025)      Melatonin 5 MG TABS Take 3 mg by mouth (Patient not taking: Reported on 1/22/2025)      methylPREDNISolone 4 MG tablet therapy pack Use as directed on package (Patient not taking: Reported on 1/22/2025) 21 each 0    Na Sulfate-K Sulfate-Mg Sulf (Suprep Bowel Prep Kit) 17.5-3.13-1.6 GM/177ML SOLN Take 360 mL by mouth once for 1 dose 360 mL 0    naproxen (Naprosyn) 500 mg tablet Take 1 tablet (500 mg total) by mouth 2 (two) times a day with meals (Patient not taking: Reported on 1/22/2025) 30  tablet 0    Norethin-Eth Estrad-Fe Biphas 1 MG-10 MCG / 10 MCG TABS Take 1 tablet by mouth in the morning 90 tablet 4    ondansetron (ZOFRAN) 4 mg tablet Take 1 tablet (4 mg total) by mouth every 6 (six) hours as needed for nausea or vomiting 30 tablet 4    predniSONE 5 mg tablet  (Patient not taking: Reported on 1/22/2025)      Qulipta 60 MG TABS       saccharomyces boulardii (FLORASTOR) 250 mg capsule Take 250 mg by mouth 2 (two) times a day      tiZANidine (ZANAFLEX) 4 mg tablet Take 1 tablet (4 mg total) by mouth every 8 (eight) hours as needed for muscle spasms (Patient not taking: Reported on 1/22/2025) 30 tablet 0    Ubrelvy 100 MG tablet Take 1 tablet (100 mg total) by mouth once as needed (migraines) for up to 1 dose 9 tablet 0    vitamin B-12 (VITAMIN B-12) 1,000 mcg tablet Take by mouth daily (Patient not taking: Reported on 1/22/2025)       No current facility-administered medications for this visit.       History Review:    The following portions of the patient's history were reviewed and updated as appropriate: allergies, current medications, past family history, past medical history, past social history, past surgical history, and problem list.    OBJECTIVE:    Vital signs in last 24 hours:    There were no vitals filed for this visit.     Mental Status Evaluation:    Appearance age appropriate, casually dressed, dressed appropriately   Behavior pleasant, cooperative, calm   Speech normal rate, normal volume, normal pitch   Mood depressed   Affect constricted   Thought Processes organized, goal directed   Associations intact associations   Thought Content no overt delusions   Perceptual Disturbances: no auditory hallucinations, no visual hallucinations   Abnormal Thoughts  Risk Potential Suicidal ideation - None  Homicidal ideation - None  Potential for aggression - No   Orientation oriented to person, place, time/date, and situation   Memory recent and remote memory grossly intact   Consciousness  alert and awake   Attention Span Concentration Span attention span and concentration are age appropriate   Intellect appears to be of average intelligence   Insight intact   Judgement intact   Muscle Strength and  Gait unable to assess today due to virtual visit   Motor Activity no abnormal movements   Language no difficulty naming common objects, no difficulty repeating a phrase, no difficulty writing a sentence   Fund of Knowledge adequate knowledge of current events  adequate fund of knowledge regarding past history  adequate fund of knowledge regarding vocabulary    Pain none   Pain Scale 0       Laboratory Results: I have personally reviewed all pertinent laboratory/tests results    Recent Labs (last 6 months):   Hospital Outpatient Visit on 02/07/2025   Component Date Value    EXT Preg Test, Ur 02/07/2025 Negative     Control 02/07/2025 Valid     Case Report 02/07/2025                      Value:Surgical Pathology Report                         Case: [id number]                                  Authorizing Provider:  Shashi Greenwood MD           Collected:           02/07/2025 0944              Ordering Location:     [facility] Received:            02/07/2025 1005                                     Endoscopy                                                                    Pathologist:           Manuela Pierce MD                                                                    Specimens:   A) - Duodenum                                                                                       B) - Stomach                                                                                        C) - Terminal Ileum                                                                                 D) - Colon, random                                                                         Final Diagnosis 02/07/2025                      Value:A. Duodenum, biopsy:  -   Duodenal mucosa with normal villous architecture  and nonspecific patchy increase in intraepithelial lymphocytes (see comment).    B. Stomach, biopsy:  -   Gastric antral and transitional mucosa with reactive gastropathy and mild chronic inactive gastritis.   -   Gastric oxyntic mucosa with mild reactive changes.  -   Separate duodenal mucosa (tissue carryover) with denuded surface epithelium.   -   Negative for intestinal metaplasia and dysplasia.  -   Negative for Helicobacter pylori-type organisms on H&E stain.      C. Terminal ileum, biopsy:  -   Small intestinal mucosa with no significant histopathologic change.   -   No evidence of ileitis.       D. Colon, random, biopsy:  -   Colonic mucosa with no significant histopathologic change.  -   No evidence of colitis.       Comment:   A) The changes are not diagnostic for celiac disease.  Correlation with celiac antibody panel and/or genetic studies is recommended.  Other considerations include medication                           effect (especially olmesartan and related compounds), infection, and immune-mediated disorders.     Interpretation performed at [facility] [address] [place] [address] [place] [address]       Additional Information 02/07/2025                      Value:All reported additional testing was performed with appropriately reactive controls.  These tests were developed and their performance characteristics determined by [facility] or appropriate performing facility, though some tests may be performed on tissues which have not been validated for performance characteristics (such as staining performed on alcohol exposed cell blocks and decalcified tissues).  Results should be interpreted with caution and in the context of the patients’ clinical condition. These tests may not be cleared or approved by the U.S. Food and Drug Administration, though the FDA has determined that such clearance or approval is not necessary. These tests are used for clinical purposes and they should  "not be regarded as investigational or for research. This laboratory has been approved by CLIA 88, designated as a high-complexity laboratory and is qualified to perform these tests.  .      Gross Description 02/07/2025                      Value:A. The specimen is received in formalin, labeled with the patient's name and hospital number, and is designated \" duodenum\".  The specimen consists of multiple tan soft tissue fragments measuring in aggregate of 0.7 x 0.7 x 0.2 cm.  Entirely submitted. Screened cassette.  B. The specimen is received in formalin, labeled with the patient's name and hospital number, and is designated \" stomach\".  The specimen consists of multiple tan-brown soft tissue fragments measuring in aggregate of 1.2 x 0.7 x 0.2 cm.  Entirely submitted. Screened cassette.  C. The specimen is received in formalin, labeled with the patient's name and hospital number, and is designated \" terminal ileum\".  The specimen consists of 2 tan soft tissue fragments measuring 0.6 and 0.8 cm in greatest dimension.  Entirely submitted. Screened cassette.  D. The specimen is received in formalin, labeled with the patient's name and hospital number, and is designated \" random colon\".  The specimen consists of multiple tan soft                           tissue fragments measuring in aggregate of 1.4 x 0.9 x 0.2 cm.  Entirely submitted. Screened cassette.    Note: The estimated total formalin fixation time based upon information provided by the submitting clinician and the standard processing schedule is under 72 hours.    -[facility]      Clinical Information 02/07/2025                      Value:Per EGD,  INDICATION:  Diarrhea of presumed infectious origin, Weight loss, abnormal, Pain of upper abdomen, Gastroesophageal reflux disease, unspecified whether esophagitis present   FINDINGS:  · The esophagus appeared normal. Z-line is 40 cm from the incisors.  · Mild, patchy edematous and erythematous mucosa in the body of the " stomach  · The 1st part of the duodenum and 2nd part of the duodenum appeared normal.  · Performed forceps biopsies in the greater curve of the stomach, lesser curve of the stomach, incisura, antrum, 1st part of the duodenum and 2nd part of the duodenum to rule out celiac disease and H. pylori     Per colonoscopy,  INDICATION:  Diarrhea of presumed infectious origin, Family history of colon cancer, Weight loss, abnormal, Pain of upper abdomen  FINDINGS:  · The terminal ileum and entire colon appeared normal. Performed random biopsy using biopsy forceps.  · Fair preparation.  Most of colonic walls were visualized and appeared normal however this exam was not                           adequate for colon cancer screening.     Appointment on 01/15/2025   Component Date Value    MEJIA SYNDROME DNA ERIKA* 01/15/2025 Not Detected     HEREDITARY BREAST & OVAR* 01/15/2025 Not Detected     FAMILIAL HYPERCHOLESTERO* 01/15/2025 Not Detected    Orders Only on 10/04/2024   Component Date Value    White Blood Cell Count 10/04/2024 7.0     Red Blood Cell Count 10/04/2024 4.67     Hemoglobin 10/04/2024 14.2     HCT 10/04/2024 42.0     MCV 10/04/2024 90     MCH 10/04/2024 30.4     MCHC 10/04/2024 33.8     RDW 10/04/2024 12.0     Platelet Count 10/04/2024 314     Neutrophils 10/04/2024 60     Lymphocytes 10/04/2024 28     Monocytes 10/04/2024 8     Eosinophils 10/04/2024 3     Basophils PCT 10/04/2024 1     Neutrophils (Absolute) 10/04/2024 4.2     Lymphocytes (Absolute) 10/04/2024 1.9     Monocytes (Absolute) 10/04/2024 0.6     Eosinophils (Absolute) 10/04/2024 0.2     Basophils ABS 10/04/2024 0.1     Immature Granulocytes 10/04/2024 0     Immature Granulocytes (A* 10/04/2024 0.0     Glucose, Random 10/04/2024 92     BUN 10/04/2024 9     Creatinine 10/04/2024 0.83     eGFR 10/04/2024 94     SL AMB BUN/CREATININE RA* 10/04/2024 11     Sodium 10/04/2024 140     Potassium 10/04/2024 4.6     Chloride 10/04/2024 105     CO2 10/04/2024 20      CALCIUM 10/04/2024 9.3     Protein, Total 10/04/2024 6.1     Albumin 10/04/2024 4.1     Globulin, Total 10/04/2024 2.0     TOTAL BILIRUBIN 10/04/2024 0.3     Alk Phos Isoenzymes 10/04/2024 66     AST 10/04/2024 14     ALT 10/04/2024 11     Total Iron Binding [place]* 10/04/2024 333     UIBC 10/04/2024 273     Iron, Serum 10/04/2024 60     Iron Saturation 10/04/2024 18     Hemoglobin A1C 10/04/2024 5.6     Cortisol 10/04/2024 9.4     Luteinizing Hormone (LH) 10/04/2024 6.5     FSH 10/04/2024 7.0     ESTRADIOL 10/04/2024 22.1     Vitamin A 10/04/2024 78.6 (H)     25-HYDROXY VIT D 10/04/2024 51.3     Vitamin B-12 10/04/2024 677     Progesterone 10/04/2024 <0.1     Ferritin 10/04/2024 59    Orders Only on 10/04/2024   Component Date Value    Hemoglobin A1C 10/04/2024 5.6    Admission on 09/18/2024, Discharged on 09/18/2024   Component Date Value    WBC 09/18/2024 10.50 (H)     RBC 09/18/2024 4.93     Hemoglobin 09/18/2024 14.6     Hematocrit 09/18/2024 43.5     MCV 09/18/2024 88     MCH 09/18/2024 29.6     MCHC 09/18/2024 33.6     RDW 09/18/2024 12.2     MPV 09/18/2024 10.0     Platelets 09/18/2024 274     nRBC 09/18/2024 0     Segmented % 09/18/2024 73     Immature Grans % 09/18/2024 0     Lymphocytes % 09/18/2024 14     Monocytes % 09/18/2024 11     Eosinophils Relative 09/18/2024 1     Basophils Relative 09/18/2024 1     Absolute Neutrophils 09/18/2024 7.74 (H)     Absolute Immature Grans 09/18/2024 0.03     Absolute Lymphocytes 09/18/2024 1.43     Absolute Monocytes 09/18/2024 1.10     Eosinophils Absolute 09/18/2024 0.14     Basophils Absolute 09/18/2024 0.06     Sodium 09/18/2024 136     Potassium 09/18/2024 3.8     Chloride 09/18/2024 104     CO2 09/18/2024 25     ANION GAP 09/18/2024 7     BUN 09/18/2024 6     Creatinine 09/18/2024 0.93     Glucose 09/18/2024 86     Calcium 09/18/2024 9.1     eGFR 09/18/2024 79     SARS COV Rapid Antigen 09/18/2024 Negative     Influenza A Rapid Antigen 09/18/2024 Negative      Influenza B Rapid Antigen 09/18/2024 Negative     Ventricular Rate 09/18/2024 88     Atrial Rate 09/18/2024 88     WV Interval 09/18/2024 132     QRSD Interval 09/18/2024 84     QT Interval 09/18/2024 374     QTC Interval 09/18/2024 452     P Axis 09/18/2024 61     QRS Axis 09/18/2024 70     T Wave [place] 09/18/2024 33    Office Visit on 09/18/2024   Component Date Value     RAPID STREP A 09/18/2024 Negative     POCT SARS-CoV-2 Ag 09/18/2024 Negative     VALID CONTROL 09/18/2024 Valid     RAPID FLU A 09/18/2024 negative     RAPID FLU B 09/18/2024 negative        Suicide/Homicide Risk Assessment:    Risk of Harm to Self:  The following ratings are based on assessment at the time of the interview and review of records  Demographic risk factors include:   Historical Risk Factors include: chronic depressive symptoms, chronic anxiety symptoms  Recent Specific Risk Factors include: diagnosis of depression, current depressive symptoms, current anxiety symptoms, chronic health problems  Protective Factors: no current suicidal ideation, ability to adapt to change, able to manage anger well, access to mental health treatment, being a parent, being , compliant with medications, compliant with mental health treatment, effective coping skills, effective decision-making skills, having a desire to be alive, having pets, resiliency, responsibilities and duties to others, stable living environment, sense of personal control, supportive family  Weapons: none. The following steps have been taken to ensure weapons are properly secured: not applicable  Based on today's assessment, [facility] presents the following risk of harm to self: low    Risk of Harm to Others:  The following ratings are based on assessment at the time of the interview and review of records  Demographic Risk Factors include: unemployed, under age 40.  Historical Risk Factors include: none.  Recent Specific Risk Factors include: multiple  stressors.  Protective Factors: no current homicidal ideation, ability to adapt to change, able to manage anger well, access to mental health treatment, being a parent, being , compliant with medications, compliant with mental health treatment, effective coping skills, effective decision-making skills, resilience, responsibilities and duties to others, safe and stable living environment, sense of personal control, supportive family  Weapons: none. The following steps have been taken to ensure weapons are properly secured: not applicable  Based on today's assessment, Jia presents the following risk of harm to others: low    The following interventions are recommended: continue medication management    Treatment Plan:    Completed and signed during the session: Yes - Treatment Plan done but not signed at time of office visit due to:  Plan reviewed by video and verbal consent given due to virtual visit. Treatment Plan sent to patient via [employer] for signature.    This note was not shared with the patient due to reasonable likelihood of causing patient harm    Visit Time    Visit Start Time: 2:05 PM  Visit Stop Time: 3:15 PM  Total Visit Duration:  70 minutes    Hi Hopkins MD 02/25/25

## 2025-02-28 ENCOUNTER — TELEPHONE (OUTPATIENT)
Age: 35
End: 2025-02-28

## 2025-02-28 NOTE — TELEPHONE ENCOUNTER
Contacted patient off of [facility]  to verify needs of services in attempts to offer patient an appointment with Michelle Beck at [facility]. LVM for patient to contact intake dept  in regards to wait list.

## 2025-02-28 NOTE — TELEPHONE ENCOUNTER
Patient returned call regarding wait list. Writer shared encounter below and offered appt, pt shared has a lot going on right now and would like to hold off on scheduling. Pt shared would like to remain on [facility] and in an couple months is open for scheduling. Writer shared will updated [facility].

## 2025-03-10 NOTE — PROGRESS NOTES
Name: Jia Armstrong      : 1990      MRN: [id number]  Encounter Provider: Carlee Scott DO  Encounter Date: 3/11/2025   Encounter department: [facility]  :  Assessment & Plan  Bronchitis  Rapid COVID/Flu (-). Exam notable for diffuse wheeze. Given pt's immunocompromised status in setting of Lupus and immunodeficiency, is at high risk of complication from infection. Will cover for bacterial etiology with Azithromycin (Reviewed possible ADRs including GI upset, encouraged probiotic) and Medrol (Reviewed possible ADRs including palpitations, insomnia, increased appetite, mood fluctuations; not to combine with Motrin/Aleve/etc, Tylenol ok). Will also r/o PNA given fever. Continue supportive care with fluids, rest, cough medicine.     Orders:  •  azithromycin (Zithromax) 250 mg tablet; Take 2 tablets (500 mg total) by mouth daily for 1 day, THEN 1 tablet (250 mg total) daily for 4 days.  •  methylPREDNISolone 4 MG tablet therapy pack; Use as directed on package  •  promethazine-dextromethorphan (PHENERGAN-DM) 6.25-15 mg/5 mL oral syrup; Take 5 mL by mouth 4 (four) times a day as needed for cough    Fever, unspecified fever cause    Orders:  •  POCT Rapid Covid Ag  •  POCT rapid flu A and B  •  XR chest pa and lateral; Future    Acute cough    Orders:  •  XR chest pa and lateral; Future    Other forms of systemic lupus erythematosus, unspecified organ involvement status (HCC)         Immunodeficiency (HCC)                History of Present Illness   HPI    Pt presents due to acute illness, onset 3/8     (+) sore throat, cough, diarrhea, myalgia, fever   Her daughter is also fighting something   Taking Mucinex      Review of Systems   Constitutional:  Positive for fever (TMax 100.5).   HENT:  Positive for sore throat and voice change. Negative for congestion, ear pain ((+) pressure) and rhinorrhea.    Respiratory:  Positive for cough (productive). Negative for chest tightness and shortness  "of breath.    Gastrointestinal:  Positive for diarrhea. Negative for abdominal pain and vomiting.   Musculoskeletal:  Positive for myalgias.   Neurological:  Negative for headaches.       Objective   /82   Pulse 79   Temp 97.7 °F (36.5 °C)   Ht 5' 6\" (1.676 m)   Wt 101 kg (221 lb 12.8 oz)   SpO2 98%   BMI 35.80 kg/m²      Physical Exam  Vitals and nursing note reviewed.   Constitutional:       General: She is not in acute distress.     Appearance: She is well-developed.   HENT:      Head: Normocephalic and atraumatic.      Right Ear: Tympanic membrane, ear canal and external ear normal. Tympanic membrane is not erythematous, retracted or bulging.      Left Ear: Tympanic membrane, ear canal and external ear normal. Tympanic membrane is not erythematous, retracted or bulging.      Nose: No rhinorrhea.      Right Sinus: Maxillary sinus tenderness (mild) present. No frontal sinus tenderness.      Left Sinus: No maxillary sinus tenderness or frontal sinus tenderness.      Mouth/Throat:      Mouth: Mucous membranes are moist.      Pharynx: No oropharyngeal exudate or posterior oropharyngeal erythema.      Comments: (+) hoarse voice   Eyes:      Conjunctiva/sclera: Conjunctivae normal.   Cardiovascular:      Rate and Rhythm: Normal rate and regular rhythm.   Pulmonary:      Effort: Pulmonary effort is normal. No respiratory distress.      Breath sounds: Wheezing (diffuse end expiratory) present. No rhonchi or rales.   Lymphadenopathy:      Cervical: No cervical adenopathy.   Skin:     General: Skin is warm and dry.   Neurological:      Mental Status: She is alert.      Comments: Grossly intact   Psychiatric:         Mood and Affect: Mood normal.         "

## 2025-03-11 ENCOUNTER — OFFICE VISIT (OUTPATIENT)
Dept: FAMILY MEDICINE CLINIC | Facility: CLINIC | Age: 35
End: 2025-03-11
Payer: COMMERCIAL

## 2025-03-11 ENCOUNTER — APPOINTMENT (OUTPATIENT)
Dept: RADIOLOGY | Facility: CLINIC | Age: 35
End: 2025-03-11
Payer: COMMERCIAL

## 2025-03-11 VITALS
HEIGHT: 66 IN | WEIGHT: 221.8 LBS | BODY MASS INDEX: 35.65 KG/M2 | SYSTOLIC BLOOD PRESSURE: 122 MMHG | TEMPERATURE: 97.7 F | OXYGEN SATURATION: 98 % | DIASTOLIC BLOOD PRESSURE: 82 MMHG | HEART RATE: 79 BPM

## 2025-03-11 DIAGNOSIS — R05.1 ACUTE COUGH: ICD-10-CM

## 2025-03-11 DIAGNOSIS — R50.9 FEVER, UNSPECIFIED FEVER CAUSE: ICD-10-CM

## 2025-03-11 DIAGNOSIS — D84.9 IMMUNODEFICIENCY (HCC): ICD-10-CM

## 2025-03-11 DIAGNOSIS — M32.8 OTHER FORMS OF SYSTEMIC LUPUS ERYTHEMATOSUS, UNSPECIFIED ORGAN INVOLVEMENT STATUS (HCC): ICD-10-CM

## 2025-03-11 DIAGNOSIS — J40 BRONCHITIS: Primary | ICD-10-CM

## 2025-03-11 PROBLEM — K80.00 CALCULUS OF GALLBLADDER WITH ACUTE CHOLECYSTITIS WITHOUT OBSTRUCTION: Status: ACTIVE | Noted: 2022-02-11

## 2025-03-11 PROBLEM — K80.00 CALCULUS OF GALLBLADDER WITH ACUTE CHOLECYSTITIS WITHOUT OBSTRUCTION: Status: RESOLVED | Noted: 2022-02-11 | Resolved: 2025-03-11

## 2025-03-11 PROBLEM — A04.72 C. DIFFICILE COLITIS: Status: RESOLVED | Noted: 2023-01-12 | Resolved: 2025-03-11

## 2025-03-11 PROBLEM — Z80.3 FAMILY HISTORY OF MALIGNANT NEOPLASM OF BREAST: Status: ACTIVE | Noted: 2020-01-08

## 2025-03-11 PROBLEM — O13.9 GESTATIONAL HYPERTENSION: Status: RESOLVED | Noted: 2018-09-01 | Resolved: 2025-03-11

## 2025-03-11 PROBLEM — K57.30 DIVERTICULAR DISEASE OF COLON: Status: ACTIVE | Noted: 2025-03-11

## 2025-03-11 PROBLEM — O13.9 GESTATIONAL HYPERTENSION: Status: ACTIVE | Noted: 2018-09-01

## 2025-03-11 PROBLEM — A04.72 C. DIFFICILE COLITIS: Status: ACTIVE | Noted: 2023-01-12

## 2025-03-11 LAB
SARS-COV-2 AG UPPER RESP QL IA: NEGATIVE
SL AMB POCT RAPID FLU A: NORMAL
SL AMB POCT RAPID FLU B: NORMAL
VALID CONTROL: NORMAL

## 2025-03-11 PROCEDURE — 87804 INFLUENZA ASSAY W/OPTIC: CPT | Performed by: FAMILY MEDICINE

## 2025-03-11 PROCEDURE — 87811 SARS-COV-2 COVID19 W/OPTIC: CPT | Performed by: FAMILY MEDICINE

## 2025-03-11 PROCEDURE — 99214 OFFICE O/P EST MOD 30 MIN: CPT | Performed by: FAMILY MEDICINE

## 2025-03-11 PROCEDURE — 71046 X-RAY EXAM CHEST 2 VIEWS: CPT

## 2025-03-11 RX ORDER — METHYLPREDNISOLONE 4 MG/1
TABLET ORAL
Qty: 21 EACH | Refills: 0 | Status: SHIPPED | OUTPATIENT
Start: 2025-03-11 | End: 2025-03-19

## 2025-03-11 RX ORDER — AZITHROMYCIN 250 MG/1
TABLET, FILM COATED ORAL
Qty: 6 TABLET | Refills: 0 | Status: SHIPPED | OUTPATIENT
Start: 2025-03-11 | End: 2025-03-16

## 2025-03-11 RX ORDER — DEXTROMETHORPHAN HYDROBROMIDE AND PROMETHAZINE HYDROCHLORIDE 15; 6.25 MG/5ML; MG/5ML
5 SYRUP ORAL 4 TIMES DAILY PRN
Qty: 180 ML | Refills: 1 | Status: SHIPPED | OUTPATIENT
Start: 2025-03-11 | End: 2025-03-19 | Stop reason: SDUPTHER

## 2025-03-12 ENCOUNTER — RESULTS FOLLOW-UP (OUTPATIENT)
Dept: FAMILY MEDICINE CLINIC | Facility: CLINIC | Age: 35
End: 2025-03-12

## 2025-03-14 ENCOUNTER — OFFICE VISIT (OUTPATIENT)
Dept: URGENT CARE | Facility: CLINIC | Age: 35
End: 2025-03-14
Payer: COMMERCIAL

## 2025-03-14 VITALS
SYSTOLIC BLOOD PRESSURE: 122 MMHG | RESPIRATION RATE: 20 BRPM | HEART RATE: 120 BPM | DIASTOLIC BLOOD PRESSURE: 89 MMHG | OXYGEN SATURATION: 99 % | TEMPERATURE: 97.6 F

## 2025-03-14 DIAGNOSIS — J20.9 ACUTE BRONCHITIS, UNSPECIFIED ORGANISM: Primary | ICD-10-CM

## 2025-03-14 PROCEDURE — G0382 LEV 3 HOSP TYPE B ED VISIT: HCPCS

## 2025-03-14 PROCEDURE — 94640 AIRWAY INHALATION TREATMENT: CPT

## 2025-03-14 RX ORDER — ALBUTEROL SULFATE 90 UG/1
2 INHALANT RESPIRATORY (INHALATION) EVERY 6 HOURS PRN
Qty: 8.5 G | Refills: 0 | Status: SHIPPED | OUTPATIENT
Start: 2025-03-14

## 2025-03-14 RX ORDER — IPRATROPIUM BROMIDE AND ALBUTEROL SULFATE 2.5; .5 MG/3ML; MG/3ML
3 SOLUTION RESPIRATORY (INHALATION) ONCE
Status: COMPLETED | OUTPATIENT
Start: 2025-03-14 | End: 2025-03-14

## 2025-03-14 RX ORDER — DOXYCYCLINE 100 MG/1
100 TABLET ORAL 2 TIMES DAILY
Qty: 14 TABLET | Refills: 0 | Status: SHIPPED | OUTPATIENT
Start: 2025-03-14 | End: 2025-03-21

## 2025-03-14 RX ADMIN — IPRATROPIUM BROMIDE AND ALBUTEROL SULFATE 3 ML: 2.5; .5 SOLUTION RESPIRATORY (INHALATION) at 19:06

## 2025-03-14 NOTE — PATIENT INSTRUCTIONS
Start on doxycycline for symptoms  For cough -- start on sudafed and guaifenesin for sick symptoms.   Go see your regular family doctor in 3-5 days.  Go to emergency room (ER) if you are getting worse.

## 2025-03-14 NOTE — PROGRESS NOTES
[facility]    NAME: Jia Armstrong is a 35 y.o. female  : 1990    MRN: [id number]  DATE: 2025  TIME: 7:25 PM    Assessment and Plan   Acute bronchitis, unspecified organism [J20.9]  1. Acute bronchitis, unspecified organism  doxycycline (ADOXA) 100 MG tablet    albuterol (ProAir HFA) 90 mcg/act inhaler    ipratropium-albuterol (DUO-NEB) 0.5-2.5 mg/3 mL inhalation solution 3 mL    Mini neb        Given duoneb in office to help with symptoms  Start on new antibiotics  Take OTC cough medicine for symptoms.   Follow up with primary care in 3-5 days.  Go to ER if symptoms get worse.     Patient Instructions     Start on doxycycline for symptoms  For cough -- start on sudafed and guaifenesin for sick symptoms.   Go see your regular family doctor in 3-5 days.  Go to emergency room (ER) if you are getting worse.     Chief Complaint     Chief Complaint   Patient presents with   • Cough     States lungs feel heavy, and cough is getting worse, also c/o sinus pressure and h/a. Taking steroids and abx.          History of Present Illness       Presents with sick symptoms that began 3/8/25.  She was seen by PCP at 4 days of symptoms.  She does diffuse wheezing.  COVID and flu test were negative chest x-ray completed were negative.  Given immunocompromise status with lupus she was given a Z-Fidel and Medrol Dosepak and Promethazine DM without relief.  Returns today on day 7 of sickness.  Symptoms include sore throat, fever, cough, diarrhea, muscle aches. Cough is the most severe with chest tightness with coughing episodes.       Review of Systems   Review of Systems   Constitutional:  Negative for chills and fever.   HENT:  Negative for congestion and sore throat.    Respiratory:  Positive for cough and chest tightness. Negative for shortness of breath.    Cardiovascular:  Negative for chest pain.   Gastrointestinal:  Negative for abdominal pain.   Musculoskeletal:  Negative for myalgias.   Skin:   Negative for wound.   Psychiatric/Behavioral:  Negative for confusion.          Current Medications       Current Outpatient Medications:   •  albuterol (ProAir HFA) 90 mcg/act inhaler, Inhale 2 puffs every 6 (six) hours as needed for wheezing, Disp: 8.5 g, Rfl: 0  •  ALPRAZolam (XANAX) 0.5 mg tablet, Take 0.5 tablets (0.25 mg total) by mouth if needed for anxiety, Disp: 5 tablet, Rfl: 1  •  Ascorbic Acid (vitamin C) 1000 MG tablet, Take 1,000 mg by mouth daily, Disp: , Rfl:   •  azithromycin (Zithromax) 250 mg tablet, Take 2 tablets (500 mg total) by mouth daily for 1 day, THEN 1 tablet (250 mg total) daily for 4 days., Disp: 6 tablet, Rfl: 0  •  Benlysta 200 MG/ML SOAJ, 200 mg Once a week, Disp: , Rfl:   •  buPROPion (WELLBUTRIN XL) 150 mg 24 hr tablet, Take 1 tablet (150 mg total) by mouth every morning To be taken with 300 mg tab for total of 450 mg daily, Disp: 90 tablet, Rfl: 0  •  buPROPion (WELLBUTRIN XL) 300 mg 24 hr tablet, Take 1 tablet (300 mg total) by mouth daily Tahe 450 mg daily, Disp: 90 tablet, Rfl: 0  •  busPIRone (BUSPAR) 10 mg tablet, Take 2 tablets (20 mg total) by mouth 2 (two) times a day, Disp: 360 tablet, Rfl: 0  •  Cholecalciferol (Vitamin D) 50 MCG (2000 UT) tablet, Take 2,000 Units by mouth daily, Disp: , Rfl:   •  clonazePAM (KlonoPIN) 0.5 mg tablet, Take 1 tablet (0.5 mg total) by mouth 3 (three) times a day, Disp: 90 tablet, Rfl: 1  •  doxycycline (ADOXA) 100 MG tablet, Take 1 tablet (100 mg total) by mouth 2 (two) times a day for 7 days, Disp: 14 tablet, Rfl: 0  •  DULoxetine (CYMBALTA) 60 mg delayed release capsule, Take 2 capsules (120 mg total) by mouth in the morning 120 mg taken evening, Disp: 180 capsule, Rfl: 0  •  esomeprazole (NexIUM) 40 MG capsule, TAKE 1 CAPSULE BY MOUTH 2 TIMES A DAY BEFORE MEALS., Disp: 180 capsule, Rfl: 1  •  fluticasone (FLONASE) 50 mcg/act nasal spray, 1 spray into each nostril daily, Disp: 16 g, Rfl: 0  •  gabapentin (NEURONTIN) 300 mg capsule, Take  300 mg by mouth 2 (two) times a day Take 2 caps BID, Disp: , Rfl:   •  hydroxychloroquine (PLAQUENIL) 200 mg tablet, Take 200 mg by mouth 2 (two) times a day with meals, Disp: , Rfl:   •  lisinopril (ZESTRIL) 5 mg tablet, TAKE 1 TABLET (5 MG TOTAL) BY MOUTH DAILY., Disp: 90 tablet, Rfl: 0  •  methylPREDNISolone 4 MG tablet therapy pack, Use as directed on package, Disp: 21 each, Rfl: 0  •  mirtazapine (REMERON) 7.5 MG tablet, Take 1 tablet (7.5 mg total) by mouth daily at bedtime, Disp: 30 tablet, Rfl: 1  •  Norethin-Eth Estrad-Fe Biphas 1 MG-10 MCG / 10 MCG TABS, Take 1 tablet by mouth in the morning, Disp: 90 tablet, Rfl: 4  •  promethazine-dextromethorphan (PHENERGAN-DM) 6.25-15 mg/5 mL oral syrup, Take 5 mL by mouth 4 (four) times a day as needed for cough, Disp: 180 mL, Rfl: 1  •  Qulipta 60 MG TABS, , Disp: , Rfl:   •  saccharomyces boulardii (FLORASTOR) 250 mg capsule, Take 250 mg by mouth 2 (two) times a day, Disp: , Rfl:   •  Ubrelvy 100 MG tablet, Take 1 tablet (100 mg total) by mouth once as needed (migraines) for up to 1 dose, Disp: 9 tablet, Rfl: 0  •  clindamycin-benzoyl peroxide (BENZACLIN) gel, Apply topically 2 (two) times a day, Disp: 25 g, Rfl: 0  •  Diclofenac Sodium (VOLTAREN) 1 %, Apply 2 g topically 4 (four) times a day, Disp: , Rfl:   •  dicyclomine (BENTYL) 20 mg tablet, TAKE 1 TABLET (20 MG TOTAL) BY MOUTH EVERY 6 HOURS AS NEEDED FOR ABDOMINAL PAIN, Disp: 360 tablet, Rfl: 1  •  EPINEPHrine (EPIPEN) 0.3 mg/0.3 mL SOAJ, Inject 0.3 mL (0.3 mg total) into a muscle once for 1 dose, Disp: 0.6 mL, Rfl: 0  •  lidocaine (Lidoderm) 5 %, Apply 1 patch topically over 12 hours daily Remove & Discard patch within 12 hours or as directed by MD, Disp: 30 patch, Rfl: 0  •  Na Sulfate-K Sulfate-Mg Sulf (Suprep Bowel Prep Kit) 17.5-3.13-1.6 GM/177ML SOLN, Take 360 mL by mouth once for 1 dose, Disp: 360 mL, Rfl: 0  •  ondansetron (ZOFRAN) 4 mg tablet, Take 1 tablet (4 mg total) by mouth every 6 (six) hours as  needed for nausea or vomiting, Disp: 30 tablet, Rfl: 4  No current facility-administered medications for this visit.    Current Allergies     Allergies as of 03/14/2025 - Reviewed 03/14/2025   Allergen Reaction Noted   • Bee venom Anaphylaxis and Hives 07/19/2024   • Amoxicillin Hives 01/20/2024   • Topamax [topiramate] Delirium 01/20/2024   • Onion - food allergy Headache 11/09/2021            The following portions of the patient's history were reviewed and updated as appropriate: allergies, current medications, past family history, past medical history, past social history, past surgical history and problem list.     Past Medical History:   Diagnosis Date   • Anxiety    • C. difficile colitis 01/12/2023   • Calculus of gallbladder with acute cholecystitis without obstruction 02/11/2022    Added automatically from request for surgery [id number]     • Depression    • Gestational hypertension 09/01/2018   • Hypertension    • Insomnia    • Lupus    • Migraines        Past Surgical History:   Procedure Laterality Date   • APPENDECTOMY      around 2023   • OCCIPITAL NEURECTOMY      2023, occipital nerve cut as migraine treatment   • REDUCTION MAMMAPLASTY  2015       Family History   Problem Relation Age of Onset   • Breast cancer Mother    • Colon cancer Father    • Colon cancer Paternal Uncle          Medications have been verified.        Objective   /89   Pulse (!) 120   Temp 97.6 °F (36.4 °C)   Resp 20   SpO2 99%        Physical Exam     Physical Exam  Vitals reviewed.   Constitutional:       General: She is not in acute distress.     Appearance: Normal appearance.   HENT:      Right Ear: Tympanic membrane, ear canal and external ear normal.      Left Ear: Tympanic membrane, ear canal and external ear normal.      Nose: Nose normal.      Mouth/Throat:      Mouth: Mucous membranes are moist.      Pharynx: No posterior oropharyngeal erythema.   Eyes:      Conjunctiva/sclera: Conjunctivae normal.    Cardiovascular:      Rate and Rhythm: Normal rate and regular rhythm.      Pulses: Normal pulses.      Heart sounds: Normal heart sounds. No murmur heard.  Pulmonary:      Effort: Pulmonary effort is normal. No respiratory distress.      Breath sounds: Wheezing (left lower lobes) present.      Comments: Cough is very, bronchial.   Chest:      Chest wall: Tenderness present.   Skin:     General: Skin is warm and dry.   Neurological:      General: No focal deficit present.      Mental Status: She is alert and oriented to person, place, and time.   Psychiatric:         Mood and Affect: Mood normal.         Behavior: Behavior normal.       Mini neb    Performed by: SHANNON Baltazar  Authorized by: SHANNON Baltazar  Universal Protocol:  Consent: Verbal consent obtained.  Consent given by: patient  Timeout called at: 3/14/2025 7:25 PM.  Patient understanding: patient states understanding of the procedure being performed  Patient identity confirmed: verbally with patient    Number of treatments:  1  Treatment 1:   Pre-Procedure     Symptoms:  Wheezing, cough and shortness of breath    Lung Sounds:  Wheezing, rhonchi    HR:  120    RR:  22    SP02:  99    Medication Administered:  Duoneb - Albuterol 2.5 mg/Atrovent 0.5 mg  Post-Procedure     Symptoms:  Cough    HR:  111    RR:  18    SP02:  99

## 2025-03-18 NOTE — PROGRESS NOTES
Name: Jia Armstrong      : 1990      MRN: [id number]  Encounter Provider: Carlee Scott DO  Encounter Date: 3/19/2025   Encounter department: [facility]  :  Assessment & Plan  Bronchitis  Benign lung exam, good oxygenation. However, still having significant cough. Discussed longer oral steroid taper vs ICS. Pt agreeable to Prednisone taper -- Reviewed possible ADRs including palpitations, insomnia, increased appetite, mood fluctuations; not to combine with Motrin/Aleve/etc, Tylenol ok. Continue supportive care with cough medicine. To call if symptoms persist/worsen, at which time we could consider lung CT, referral to Pulm. Also discussed she may benefit from PFTs once recovered as she has had multiple episodes of bronchitis over the past year. Pt is at increased risk of complication from infection given co-morbidities as below.   Orders:    predniSONE 10 mg tablet; Take 4 tablets (40 mg total) by mouth daily for 3 days, THEN 3 tablets (30 mg total) daily for 3 days, THEN 2 tablets (20 mg total) daily for 3 days, THEN 1 tablet (10 mg total) daily for 3 days.    promethazine-dextromethorphan (PHENERGAN-DM) 6.25-15 mg/5 mL oral syrup; Take 5 mL by mouth 4 (four) times a day as needed for cough    Other forms of systemic lupus erythematosus, unspecified organ involvement status (HCC)  On Plaquenil, follow up with Rheum as scheduled        Immunodeficiency (HCC)  Chronic in setting of SLE treatment as above               History of Present Illness   HPI    Pt presents with her daughter due to ongoing illness.     Saw this provider 3/11 -- Rapid COVID/Flu (-), CXR benign. Given Azithromycin, Medrol, Promethazine.   Urgent Care 3/14 due to persistent symptoms. Switched to Doxycycline, given duoneb    Today:   Still having significant cough (though non-productive) and feels heaviness in her chest   URI symptoms have improved, aside from sore throat from cough  Taking Mucinex All-in-One,  "WILMER did not help           Review of Systems   Constitutional:  Fever: TMax 100.4.   HENT:  Positive for sore throat. Negative for congestion, ear pain and rhinorrhea.    Respiratory:  Positive for cough (non-productive), chest tightness (heaviness) and shortness of breath (\"a little bit\").    Gastrointestinal:  Positive for diarrhea (\"off and on\"). Negative for abdominal pain and vomiting.       Objective   /78 (BP Location: Left arm, Patient Position: Sitting, Cuff Size: Large)   Pulse 88   Temp 98.3 °F (36.8 °C)   Ht 5' 6\" (1.676 m)   SpO2 97%   BMI 35.80 kg/m²      Physical Exam  Vitals and nursing note reviewed.   Constitutional:       General: She is not in acute distress.     Appearance: She is well-developed.   HENT:      Head: Normocephalic and atraumatic.      Right Ear: Tympanic membrane, ear canal and external ear normal. Tympanic membrane is not erythematous, retracted or bulging.      Left Ear: Tympanic membrane, ear canal and external ear normal. Tympanic membrane is not erythematous, retracted or bulging.      Nose: Nose normal. No rhinorrhea.      Right Sinus: No maxillary sinus tenderness or frontal sinus tenderness.      Left Sinus: No maxillary sinus tenderness or frontal sinus tenderness.      Mouth/Throat:      Mouth: Mucous membranes are moist.      Pharynx: No oropharyngeal exudate or posterior oropharyngeal erythema.   Eyes:      Conjunctiva/sclera: Conjunctivae normal.   Cardiovascular:      Rate and Rhythm: Normal rate and regular rhythm.   Pulmonary:      Effort: Pulmonary effort is normal. No respiratory distress.      Breath sounds: Normal breath sounds. No wheezing, rhonchi or rales.      Comments: Deep dry cough intermittently  Lymphadenopathy:      Cervical: No cervical adenopathy.   Skin:     General: Skin is warm and dry.   Neurological:      Mental Status: She is alert.      Comments: Grossly intact   Psychiatric:         Mood and Affect: Mood normal.         "

## 2025-03-19 ENCOUNTER — OFFICE VISIT (OUTPATIENT)
Dept: FAMILY MEDICINE CLINIC | Facility: CLINIC | Age: 35
End: 2025-03-19
Payer: COMMERCIAL

## 2025-03-19 VITALS
SYSTOLIC BLOOD PRESSURE: 138 MMHG | BODY MASS INDEX: 35.8 KG/M2 | HEIGHT: 66 IN | HEART RATE: 88 BPM | TEMPERATURE: 98.3 F | DIASTOLIC BLOOD PRESSURE: 78 MMHG | OXYGEN SATURATION: 97 %

## 2025-03-19 DIAGNOSIS — J40 BRONCHITIS: Primary | ICD-10-CM

## 2025-03-19 DIAGNOSIS — M32.8 OTHER FORMS OF SYSTEMIC LUPUS ERYTHEMATOSUS, UNSPECIFIED ORGAN INVOLVEMENT STATUS (HCC): ICD-10-CM

## 2025-03-19 DIAGNOSIS — D84.9 IMMUNODEFICIENCY (HCC): ICD-10-CM

## 2025-03-19 PROCEDURE — 99214 OFFICE O/P EST MOD 30 MIN: CPT | Performed by: FAMILY MEDICINE

## 2025-03-19 RX ORDER — DEXTROMETHORPHAN HYDROBROMIDE AND PROMETHAZINE HYDROCHLORIDE 15; 6.25 MG/5ML; MG/5ML
5 SYRUP ORAL 4 TIMES DAILY PRN
Qty: 180 ML | Refills: 1 | Status: SHIPPED | OUTPATIENT
Start: 2025-03-19

## 2025-03-19 RX ORDER — PREDNISONE 10 MG/1
TABLET ORAL
Qty: 30 TABLET | Refills: 0 | Status: SHIPPED | OUTPATIENT
Start: 2025-03-19 | End: 2025-03-30

## 2025-03-20 DIAGNOSIS — F33.2 MAJOR DEPRESSIVE DISORDER, RECURRENT SEVERE WITHOUT PSYCHOTIC FEATURES (HCC): ICD-10-CM

## 2025-03-25 ENCOUNTER — TELEMEDICINE (OUTPATIENT)
Dept: PSYCHIATRY | Facility: CLINIC | Age: 35
End: 2025-03-25

## 2025-03-25 DIAGNOSIS — Z91.199 NO-SHOW FOR APPOINTMENT: Primary | ICD-10-CM

## 2025-03-25 PROCEDURE — NOSHOW: Performed by: STUDENT IN AN ORGANIZED HEALTH CARE EDUCATION/TRAINING PROGRAM

## 2025-03-25 NOTE — PSYCH
Patient did not show for scheduled appointment. Patient to be rescheduled for a new appointment at a later time.    Hi Hopkins MD 03/25/25

## 2025-04-06 DIAGNOSIS — J20.9 ACUTE BRONCHITIS, UNSPECIFIED ORGANISM: ICD-10-CM

## 2025-04-06 RX ORDER — ALBUTEROL SULFATE 90 UG/1
INHALANT RESPIRATORY (INHALATION)
Qty: 6.7 G | Refills: 0 | Status: SHIPPED | OUTPATIENT
Start: 2025-04-06

## 2025-04-17 DIAGNOSIS — K21.9 GASTROESOPHAGEAL REFLUX DISEASE WITHOUT ESOPHAGITIS: ICD-10-CM

## 2025-04-18 RX ORDER — ESOMEPRAZOLE MAGNESIUM 40 MG/1
CAPSULE, DELAYED RELEASE ORAL
Qty: 180 CAPSULE | Refills: 1 | Status: SHIPPED | OUTPATIENT
Start: 2025-04-18

## 2025-05-04 DIAGNOSIS — G43.809 OTHER MIGRAINE, NOT INTRACTABLE, WITHOUT STATUS MIGRAINOSUS: ICD-10-CM

## 2025-05-05 ENCOUNTER — TELEPHONE (OUTPATIENT)
Age: 35
End: 2025-05-05

## 2025-05-05 NOTE — TELEPHONE ENCOUNTER
Patient called to follow up on her refill request she sent from her MyChart.      Qulipta 60 MG TABS     She is out of the medication and has been having migraine, she stated pharmacy on file is best      Please advise, thank you

## 2025-05-06 DIAGNOSIS — G43.809 OTHER MIGRAINE WITHOUT STATUS MIGRAINOSUS, NOT INTRACTABLE: Primary | ICD-10-CM

## 2025-05-06 RX ORDER — ATOGEPANT 60 MG/1
TABLET ORAL
Refills: 0 | OUTPATIENT
Start: 2025-05-06

## 2025-05-06 RX ORDER — ATOGEPANT 60 MG/1
60 TABLET ORAL DAILY
Qty: 30 TABLET | Refills: 0 | Status: SHIPPED | OUTPATIENT
Start: 2025-05-06 | End: 2025-05-14 | Stop reason: SDUPTHER

## 2025-05-06 NOTE — TELEPHONE ENCOUNTER
Pt called back. She has a NP with Neuro on 5/14 and yes is still taking 60mg. Pt is agreeable to short course since she sees Neuro next week and expressed her thanks.

## 2025-05-06 NOTE — TELEPHONE ENCOUNTER
I can send short course in but would like her to establish with neurology, I have previously placed referral  Can we confirm 60 mg once daily?

## 2025-05-08 ENCOUNTER — OFFICE VISIT (OUTPATIENT)
Dept: URGENT CARE | Facility: CLINIC | Age: 35
End: 2025-05-08
Payer: COMMERCIAL

## 2025-05-08 ENCOUNTER — APPOINTMENT (OUTPATIENT)
Dept: RADIOLOGY | Facility: CLINIC | Age: 35
End: 2025-05-08
Attending: PHYSICIAN ASSISTANT
Payer: COMMERCIAL

## 2025-05-08 VITALS
TEMPERATURE: 98.7 F | OXYGEN SATURATION: 99 % | SYSTOLIC BLOOD PRESSURE: 122 MMHG | DIASTOLIC BLOOD PRESSURE: 76 MMHG | RESPIRATION RATE: 18 BRPM | HEART RATE: 81 BPM

## 2025-05-08 DIAGNOSIS — M79.672 ACUTE FOOT PAIN, LEFT: Primary | ICD-10-CM

## 2025-05-08 DIAGNOSIS — M32.8 OTHER FORMS OF SYSTEMIC LUPUS ERYTHEMATOSUS, UNSPECIFIED ORGAN INVOLVEMENT STATUS (HCC): ICD-10-CM

## 2025-05-08 DIAGNOSIS — Z87.81 HISTORY OF FRACTURE OF FOOT: ICD-10-CM

## 2025-05-08 DIAGNOSIS — M79.672 ACUTE FOOT PAIN, LEFT: ICD-10-CM

## 2025-05-08 PROCEDURE — 73630 X-RAY EXAM OF FOOT: CPT

## 2025-05-08 PROCEDURE — G0383 LEV 4 HOSP TYPE B ED VISIT: HCPCS | Performed by: PHYSICIAN ASSISTANT

## 2025-05-08 RX ORDER — ATOGEPANT 60 MG/1
TABLET ORAL
Qty: 90 TABLET | OUTPATIENT
Start: 2025-05-08

## 2025-05-08 NOTE — PATIENT INSTRUCTIONS
Recommend 800 mg of Motrin up to 3x daily for pain.  Orthopedist referral placed  Follow up with Rheumatologist.  Follow up with PCP.  ER if symptoms become severe.

## 2025-05-09 ENCOUNTER — TELEPHONE (OUTPATIENT)
Age: 35
End: 2025-05-09

## 2025-05-09 ENCOUNTER — TELEMEDICINE (OUTPATIENT)
Dept: PSYCHIATRY | Facility: CLINIC | Age: 35
End: 2025-05-09
Payer: COMMERCIAL

## 2025-05-09 DIAGNOSIS — F33.2 MAJOR DEPRESSIVE DISORDER, RECURRENT SEVERE WITHOUT PSYCHOTIC FEATURES (HCC): Primary | ICD-10-CM

## 2025-05-09 DIAGNOSIS — F33.2 MAJOR DEPRESSIVE DISORDER, RECURRENT SEVERE WITHOUT PSYCHOTIC FEATURES (HCC): ICD-10-CM

## 2025-05-09 DIAGNOSIS — F41.1 GENERALIZED ANXIETY DISORDER: ICD-10-CM

## 2025-05-09 PROCEDURE — 98006 SYNCH AUDIO-VIDEO EST MOD 30: CPT | Performed by: STUDENT IN AN ORGANIZED HEALTH CARE EDUCATION/TRAINING PROGRAM

## 2025-05-09 RX ORDER — BUPROPION HYDROCHLORIDE 150 MG/1
150 TABLET ORAL EVERY MORNING
Qty: 90 TABLET | Refills: 0 | Status: SHIPPED | OUTPATIENT
Start: 2025-05-09

## 2025-05-09 RX ORDER — CLONAZEPAM 0.5 MG/1
0.5 TABLET ORAL 3 TIMES DAILY
Qty: 90 TABLET | Refills: 1 | Status: SHIPPED | OUTPATIENT
Start: 2025-05-09

## 2025-05-09 RX ORDER — BUSPIRONE HYDROCHLORIDE 10 MG/1
20 TABLET ORAL 2 TIMES DAILY
Qty: 360 TABLET | Refills: 0 | Status: SHIPPED | OUTPATIENT
Start: 2025-05-09

## 2025-05-09 RX ORDER — BUPROPION HYDROCHLORIDE 300 MG/1
300 TABLET ORAL DAILY
Qty: 90 TABLET | Refills: 0 | Status: SHIPPED | OUTPATIENT
Start: 2025-05-09

## 2025-05-09 RX ORDER — MIRTAZAPINE 7.5 MG/1
7.5 TABLET, FILM COATED ORAL
Qty: 90 TABLET | Refills: 1 | OUTPATIENT
Start: 2025-05-09

## 2025-05-09 RX ORDER — DULOXETIN HYDROCHLORIDE 60 MG/1
120 CAPSULE, DELAYED RELEASE ORAL DAILY
Qty: 180 CAPSULE | Refills: 0 | Status: SHIPPED | OUTPATIENT
Start: 2025-05-09

## 2025-05-09 RX ORDER — ALPRAZOLAM 0.5 MG
0.25 TABLET ORAL AS NEEDED
Qty: 5 TABLET | Refills: 1 | Status: SHIPPED | OUTPATIENT
Start: 2025-05-09

## 2025-05-09 RX ORDER — MIRTAZAPINE 15 MG/1
15 TABLET, FILM COATED ORAL
Qty: 30 TABLET | Refills: 1 | Status: SHIPPED | OUTPATIENT
Start: 2025-05-09

## 2025-05-09 NOTE — ASSESSMENT & PLAN NOTE
Symptoms remain persistent. She has had slight progress with initiation of Remeron, though remains depressed secondary to numerous stressors related to her daughter's health, her health, and difficulty with managing her daily responsibilities. She has had increased fatigue, though is unsure if this is due to Remeron or flare ups of her SLE. She has also had increased appetite. However, she is agreeable to titration attempt of Remeron.    Increase Remeron to 15 mg QHS.  Continue Cymbalta 120 mg daily.  Continue Wellbutrin 150 mg daily.  Continue BuSpar 20 mg twice daily.  Continue Klonopin 0.75 mg twice daily.  Reviewed PDMP, patient has been feeling this appropriately.    Patient has been managed additionally with Xanax 0.25 mg as needed for panic attacks.  She uses this roughly weekly.  Per PDMP, she does not require frequent fills of this.  We will continue at this time, though patient has been advised of goal to minimize benzodiazepine use and his symptoms improve.    Patient has attempted therapy in the past on several occasions and has not found benefit with this.  Patient currently not interested in pursuing psychotherapy.

## 2025-05-09 NOTE — PSYCH
MEDICATION MANAGEMENT NOTE    Name: Jia Armstrong      : 1990      MRN: [id number]  Encounter Provider: Hi Hopkins MD  Encounter Date: 2025   Encounter department: [facility]    Insurance: Payor: ALAYNA / Plan: [facility] HEALTH BENEFIT PLAN / Product Type: Fee for Service Commercial /      Reason for Visit:   Chief Complaint   Patient presents with    Follow-up    Medication Management   :  Assessment & Plan  Major depressive disorder, recurrent severe without psychotic features (HCC)  Symptoms remain persistent. She has had slight progress with initiation of Remeron, though remains depressed secondary to numerous stressors related to her daughter's health, her health, and difficulty with managing her daily responsibilities. She has had increased fatigue, though is unsure if this is due to Remeron or flare ups of her SLE. She has also had increased appetite. However, she is agreeable to titration attempt of Remeron.    Increase Remeron to 15 mg QHS.  Continue Cymbalta 120 mg daily.  Continue Wellbutrin 150 mg daily.  Continue BuSpar 20 mg twice daily.  Continue Klonopin 0.75 mg twice daily.  Reviewed PDMP, patient has been feeling this appropriately.    Patient has been managed additionally with Xanax 0.25 mg as needed for panic attacks.  She uses this roughly weekly.  Per PDMP, she does not require frequent fills of this.  We will continue at this time, though patient has been advised of goal to minimize benzodiazepine use and his symptoms improve.    Patient has attempted therapy in the past on several occasions and has not found benefit with this.  Patient currently not interested in pursuing psychotherapy.    Orders:    buPROPion (WELLBUTRIN XL) 300 mg 24 hr tablet; Take 1 tablet (300 mg total) by mouth daily Tahe 450 mg daily    buPROPion (WELLBUTRIN XL) 150 mg 24 hr tablet; Take 1 tablet (150 mg total) by mouth every morning To be taken with 300 mg tab for  total of 450 mg daily    DULoxetine (CYMBALTA) 60 mg delayed release capsule; Take 2 capsules (120 mg total) by mouth in the morning 120 mg taken evening    mirtazapine (REMERON) 15 mg tablet; Take 1 tablet (15 mg total) by mouth daily at bedtime    Generalized anxiety disorder  Symptoms have had some improvement with Remeron but have remained persistent secondary to stressors. Will attempt trial of titration of Remeron.    See plan for major depressive disorder.    Orders:    ALPRAZolam (XANAX) 0.5 mg tablet; Take 0.5 tablets (0.25 mg total) by mouth if needed for anxiety    busPIRone (BUSPAR) 10 mg tablet; Take 2 tablets (20 mg total) by mouth 2 (two) times a day    clonazePAM (KlonoPIN) 0.5 mg tablet; Take 1 tablet (0.5 mg total) by mouth 3 (three) times a day        Treatment Recommendations:    Educated about diagnosis and treatment modalities. Verbalizes understanding and agreement with the treatment plan.  Discussed self monitoring of symptoms, and symptom monitoring tools.  Discussed medications and if treatment adjustment was needed or desired.  Medication management every 4 weeks  Aware of 24 hour and weekend coverage for urgent situations accessed by calling [facility] main practice number  I am scheduling this patient out for greater than 3 months: No    Medications Risks/Benefits:      Risks, Benefits And Possible Side Effects Of Medications:    Risks, benefits, and possible side effects of medications explained to Jia and she (or legal representative) verbalizes understanding and agreement for treatment.    Controlled Medication Discussion:     Jia has been filling controlled prescriptions on time as prescribed according to [place] Prescription Drug Monitoring Program.      History of Present Illness     This patient presents for medication management follow up for MDD and SHERLY. At initial visit, patient was started on Remeron 7.5 mg QHS and continued on Cymbalta 60 mg  daily, Wellbutrin  mg daily, and Klonopin 0.5 mg TID. She is also prescribed a small amount of Xanax 0.25 mg PRN for severe breakthrough anxiety, with very rare use of this. She reports since last visit she has been dealing with significant stressors related to her daughter's medical issues. Her daughter has CVID and requires regular subcutaneous IG infusions weekly. She had several illnesses during the winter and early spring and had been in the ED due to illness. The patient had also gotten sick as a result. She has been also dealing with stress related to her daughter's school. Her daughter developed encopresis after severe constipation secondary to her medications. She has been dealing with the school not being willing to work with them regarding accommodations for her when her daughter has episodes of incontinence at school. She is working with companies to provide a CNA for her for when she is at school, though has not had anything confirmed yet. She also has been struggling with her SLE diagnosis, recently with continued arthritis symptoms in her ankles and hands, which has made caring for her daughter more challenging. She feels she is falling behind in her daily responsibilities and feeling overwhelmed. She is also dealing with problems with their home due to poor quality construction and renovation work that had been done prior to them purchasing the house. She noticed some improvement in mood with starting Remeron. She has felt tired during the day but is unsure if this is from the Remeron or fatigue from her SLE. She has also noticed some increased appetite. Her mood remains depressed, though slightly improved, and her anxiety has had some improvement as well. She denies SI, HI, or AVH. She is agreeable to titration of Remeron on trial basis.    Review Of Systems: A review of systems is obtained and is negative except for the pertinent positives listed in HPI/Subjective above.      Current Rating  Scores:     Current PHQ-9   PHQ-2/9 Depression Screening    Little interest or pleasure in doing things: 2 - more than half the days  Feeling down, depressed, or hopeless: 1 - several days  Trouble falling or staying asleep, or sleeping too much: 3 - nearly every day  Feeling tired or having little energy: 3 - nearly every day  Poor appetite or overeatin - several days  Feeling bad about yourself - or that you are a failure or have let yourself or your family down: 1 - several days  Trouble concentrating on things, such as reading the newspaper or watching television: 0 - not at all  Moving or speaking so slowly that other people could have noticed. Or the opposite - being so fidgety or restless that you have been moving around a lot more than usual: 0 - not at all  Thoughts that you would be better off dead, or of hurting yourself in some way: 0 - not at all  PHQ-9 Score: 11  PHQ-9 Interpretation: Moderate depression       Current SHERLY-7   SHERLY-7 Flowsheet Screening      Flowsheet Row Most Recent Value   Over the last two weeks, how often have you been bothered by the following problems?     Feeling nervous, anxious, or on edge 1    Not being able to stop or control worrying 1    Worrying too much about different things 1    Trouble relaxing  3    Being so restless that it's hard to sit still 0    Becoming easily annoyed or irritable  0    Feeling afraid as if something awful might happen 0    How difficult have these problems made it for you to do your work, take care of things at home, or get along with other people?  Somewhat difficult    SHERLY Score  6             Areas of Improvement: reviewed in HPI/Subjective Section and reviewed in Assessment and Plan Section      Past Medical History:   Diagnosis Date    Anxiety     C. difficile colitis 2023    Calculus of gallbladder with acute cholecystitis without obstruction 2022    Added automatically from request for surgery [id number]      Depression      Gestational hypertension 09/01/2018    Hypertension     Insomnia     Lupus     Migraines      Past Surgical History:   Procedure Laterality Date    APPENDECTOMY      around 2023    OCCIPITAL NEURECTOMY      2023, occipital nerve cut as migraine treatment    REDUCTION MAMMAPLASTY  2015     Allergies:   Allergies   Allergen Reactions    Bee Venom Anaphylaxis and Hives    Amoxicillin Hives    Topamax [Topiramate] Delirium       Current Outpatient Medications   Medication Instructions    albuterol (PROVENTIL HFA,VENTOLIN HFA) 90 mcg/act inhaler INHALE 2 PUFFS EVERY 6 HOURS AS NEEDED FOR WHEEZING    ALPRAZolam (XANAX) 0.25 mg, Oral, As needed    Benlysta 200 mg    buPROPion (WELLBUTRIN XL) 300 mg, Oral, Daily, Tahe 450 mg daily    buPROPion (WELLBUTRIN XL) 150 mg, Oral, Every morning, To be taken with 300 mg tab for total of 450 mg daily    busPIRone (BUSPAR) 20 mg, Oral, 2 times daily    clindamycin-benzoyl peroxide (BENZACLIN) gel Topical, 2 times daily    clonazePAM (KLONOPIN) 0.5 mg, Oral, 3 times daily    Diclofenac Sodium (VOLTAREN) 2 g, 4 times daily    dicyclomine (BENTYL) 20 mg tablet TAKE 1 TABLET (20 MG TOTAL) BY MOUTH EVERY 6 HOURS AS NEEDED FOR ABDOMINAL PAIN    DULoxetine (CYMBALTA) 120 mg, Oral, Daily, 120 mg taken evening    EPINEPHrine (EPIPEN) 0.3 mg, Intramuscular, Once    esomeprazole (NexIUM) 40 MG capsule TAKE 1 CAPSULE BY MOUTH 2 TIMES A DAY BEFORE MEALS.    fluticasone (FLONASE) 50 mcg/act nasal spray 1 spray, Nasal, Daily    gabapentin (NEURONTIN) 300 mg, 2 times daily    hydroxychloroquine (PLAQUENIL) 200 mg, 2 times daily with meals    lidocaine (Lidoderm) 5 % 1 patch, Topical, Daily, Remove & Discard patch within 12 hours or as directed by MD    lisinopril (ZESTRIL) 5 mg, Oral, Daily    mirtazapine (REMERON) 15 mg, Oral, Daily at bedtime    Na Sulfate-K Sulfate-Mg Sulf (Suprep Bowel Prep Kit) 17.5-3.13-1.6 GM/177ML SOLN 360 mL, Oral, Once    Norethin-Eth Estrad-Fe Biphas 1 MG-10 MCG / 10 MCG  "TABS 1 tablet, Oral, Daily    ondansetron (ZOFRAN) 4 mg, Oral, Every 6 hours PRN    promethazine-dextromethorphan (PHENERGAN-DM) 6.25-15 mg/5 mL oral syrup 5 mL, Oral, 4 times daily PRN    Qulipta 60 mg, Oral, Daily    saccharomyces boulardii (FLORASTOR) 250 mg, 2 times daily    Ubrelvy 100 mg, Oral, Once as needed    vitamin C 1,000 mg, Daily    Vitamin D 2,000 Units, Daily        Substance Abuse History:    Tobacco, Alcohol and Drug Use History     Tobacco Use    Smoking status: Never     Passive exposure: Never    Smokeless tobacco: Never   Vaping Use    Vaping status: Never Used   Substance Use Topics    Alcohol use: Not Currently    Drug use: Not Currently     Types: Marijuana          Social History:    Social History     Socioeconomic History    Marital status: /Civil Union     Spouse name: Not on file    Number of children: Not on file    Years of education: Not on file    Highest education level: Not on file   Occupational History    Not on file   Other Topics Concern    Not on file   Social History Narrative    Not on file        Family Psychiatric History:     Family History   Problem Relation Age of Onset    Breast cancer Mother     Colon cancer Father     Colon cancer Paternal Uncle        Medical History Reviewed by provider this encounter:          Objective   There were no vitals taken for this visit.     Mental Status Evaluation:    Appearance age appropriate, casually dressed   Behavior pleasant, cooperative, calm   Speech normal rate, normal volume, normal pitch, spontaneous   Mood \"Stressed\"   Affect normal range and intensity, appropriate   Thought Processes organized, goal directed, linear   Thought Content no overt delusions   Perceptual Disturbances: no auditory hallucinations, no visual hallucinations   Abnormal Thoughts  Risk Potential Suicidal ideation - None  Homicidal ideation - None  Potential for aggression - No   Orientation oriented to person, place, time/date, and situation "   Memory recent and remote memory grossly intact   Consciousness alert and awake   Attention Span Concentration Span attention span and concentration are age appropriate   Intellect appears to be of average intelligence   Insight intact   Judgement intact   Muscle Strength and  Gait unable to assess today due to virtual visit   Motor activity no abnormal movements   Language no difficulty naming common objects, no difficulty repeating a phrase, no difficulty writing a sentence   Fund of Knowledge adequate knowledge of current events  adequate fund of knowledge regarding past history  adequate fund of knowledge regarding vocabulary        Laboratory Results: I have personally reviewed all pertinent laboratory/tests results    Recent Labs (last 6 months):   Office Visit on 03/11/2025   Component Date Value    POCT SARS-CoV-2 Ag 03/11/2025 Negative     VALID CONTROL 03/11/2025 Valid     RAPID FLU A 03/11/2025 neg     RAPID FLU B 03/11/2025 neg    Hospital Outpatient Visit on 02/07/2025   Component Date Value    EXT Preg Test, Ur 02/07/2025 Negative     Control 02/07/2025 Valid     Case Report 02/07/2025                      Value:Surgical Pathology Report                         Case: [id number]                                  Authorizing Provider:  Shashi Greenwood MD           Collected:           02/07/2025 0944              Ordering Location:     [facility] Received:            02/07/2025 1005                                     Endoscopy                                                                    Pathologist:           Manuela Pierce MD                                                                    Specimens:   A) - Duodenum                                                                                       B) - Stomach                                                                                        C) - Terminal Ileum                                                                                  D) - Colon, random                                                                         Final Diagnosis 02/07/2025                      Value:A. Duodenum, biopsy:  -   Duodenal mucosa with normal villous architecture and nonspecific patchy increase in intraepithelial lymphocytes (see comment).    B. Stomach, biopsy:  -   Gastric antral and transitional mucosa with reactive gastropathy and mild chronic inactive gastritis.   -   Gastric oxyntic mucosa with mild reactive changes.  -   Separate duodenal mucosa (tissue carryover) with denuded surface epithelium.   -   Negative for intestinal metaplasia and dysplasia.  -   Negative for Helicobacter pylori-type organisms on H&E stain.      C. Terminal ileum, biopsy:  -   Small intestinal mucosa with no significant histopathologic change.   -   No evidence of ileitis.       D. Colon, random, biopsy:  -   Colonic mucosa with no significant histopathologic change.  -   No evidence of colitis.       Comment:   A) The changes are not diagnostic for celiac disease.  Correlation with celiac antibody panel and/or genetic studies is recommended.  Other considerations include medication                           effect (especially olmesartan and related compounds), infection, and immune-mediated disorders.     Interpretation performed at [facility] [address], [place] [address]       Additional Information 02/07/2025                      Value:All reported additional testing was performed with appropriately reactive controls.  These tests were developed and their performance characteristics determined by [facility] or appropriate performing facility, though some tests may be performed on tissues which have not been validated for performance characteristics (such as staining performed on alcohol exposed cell blocks and decalcified tissues).  Results should be interpreted with caution and in the context of the patients’ clinical  "condition. These tests may not be cleared or approved by the U.S. Food and Drug Administration, though the FDA has determined that such clearance or approval is not necessary. These tests are used for clinical purposes and they should not be regarded as investigational or for research. This laboratory has been approved by CLIA 88, designated as a high-complexity laboratory and is qualified to perform these tests.  .      Gross Description 02/07/2025                      Value:A. The specimen is received in formalin, labeled with the patient's name and hospital number, and is designated \" duodenum\".  The specimen consists of multiple tan soft tissue fragments measuring in aggregate of 0.7 x 0.7 x 0.2 cm.  Entirely submitted. Screened cassette.  B. The specimen is received in formalin, labeled with the patient's name and hospital number, and is designated \" stomach\".  The specimen consists of multiple tan-brown soft tissue fragments measuring in aggregate of 1.2 x 0.7 x 0.2 cm.  Entirely submitted. Screened cassette.  C. The specimen is received in formalin, labeled with the patient's name and hospital number, and is designated \" terminal ileum\".  The specimen consists of 2 tan soft tissue fragments measuring 0.6 and 0.8 cm in greatest dimension.  Entirely submitted. Screened cassette.  D. The specimen is received in formalin, labeled with the patient's name and hospital number, and is designated \" random colon\".  The specimen consists of multiple tan soft                           tissue fragments measuring in aggregate of 1.4 x 0.9 x 0.2 cm.  Entirely submitted. Screened cassette.    Note: The estimated total formalin fixation time based upon information provided by the submitting clinician and the standard processing schedule is under 72 hours.    -[facility]      Clinical Information 02/07/2025                      Value:Per EGD,  INDICATION:  Diarrhea of presumed infectious origin, Weight loss, abnormal, Pain of " upper abdomen, Gastroesophageal reflux disease, unspecified whether esophagitis present   FINDINGS:  · The esophagus appeared normal. Z-line is 40 cm from the incisors.  · Mild, patchy edematous and erythematous mucosa in the body of the stomach  · The 1st part of the duodenum and 2nd part of the duodenum appeared normal.  · Performed forceps biopsies in the greater curve of the stomach, lesser curve of the stomach, incisura, antrum, 1st part of the duodenum and 2nd part of the duodenum to rule out celiac disease and H. pylori     Per colonoscopy,  INDICATION:  Diarrhea of presumed infectious origin, Family history of colon cancer, Weight loss, abnormal, Pain of upper abdomen  FINDINGS:  · The terminal ileum and entire colon appeared normal. Performed random biopsy using biopsy forceps.  · Fair preparation.  Most of colonic walls were visualized and appeared normal however this exam was not                           adequate for colon cancer screening.     Appointment on 01/15/2025   Component Date Value    MEJIA SYNDROME DNA ERIKA* 01/15/2025 Not Detected     HEREDITARY BREAST & OVAR* 01/15/2025 Not Detected     FAMILIAL HYPERCHOLESTERO* 01/15/2025 Not Detected        Suicide/Homicide Risk Assessment:    Risk of Harm to Self:  Based on today's assessment, [facility] presents the following risk of harm to self: low    Risk of Harm to Others:  Based on today's assessment, Jia presents the following risk of harm to others: low    The following interventions are recommended: Continue medication management. No other intervention changes indicated at this time.    Psychotherapy Provided:     Individual psychotherapy provided: Yes    Medication changes discussed with Jia.  Medication education provided to Jia.  Discussed with Jia recent stressors including daughter's medical problems, the patient's medical problems, difficulty with daily responsibilities.  Importance of medication and treatment compliance reviewed with  "Jia.  Reassurance and supportive therapy provided.     Treatment Plan:    Completed and signed during the session: Not applicable - Treatment Plan not due at this session.    Goals: Progress towards Treatment Plan goals - Yes, progressing, as evidenced by subjective findings in HPI/Subjective Section and in Assessment and Plan Section    Depression Follow-up Plan Completed: Yes    Note Share:    This note was not shared with the patient due to reasonable likelihood of causing patient harm    Administrative Statements   Administrative Statements   Encounter provider Hi Hopkins MD    The Patient is located at Home and in the following state in which I hold an active license [place].    The patient was identified by name and date of birth. Jia Armstrong was informed that this is a telemedicine visit and that the visit is being conducted through the Epic Embedded platform. She agrees to proceed..  My office door was closed. No one else was in the room.  She acknowledged consent and understanding of privacy and security of the video platform. The patient has agreed to participate and understands they can discontinue the visit at any time.    I have spent a total time of 50 minutes in caring for this patient on the day of the visit/encounter including Risks and benefits of tx options, Instructions for management, Patient and family education, Importance of tx compliance, Risk factor reductions, Impressions, Counseling / Coordination of care, Documenting in the medical record, and Reviewing/placing orders in the medical record (including tests, medications, and/or procedures), not including the time spent for establishing the audio/video connection.    Visit Time  Visit Start Time: 3:10 PM  Visit Stop Time: 4:00 PM  Total Visit Duration:  50 minutes    Portions of the record may have been created with voice recognition software. Occasional wrong word or \"sound a like\" substitutions may have occurred due to the " inherent limitations of voice recognition software. Read the chart carefully and recognize, using context, where substitutions have occurred.    Hi Hopkins MD 05/09/25

## 2025-05-09 NOTE — ASSESSMENT & PLAN NOTE
Symptoms have had some improvement with Remeron but have remained persistent secondary to stressors. Will attempt trial of titration of Remeron.    See plan for major depressive disorder.

## 2025-05-09 NOTE — PROGRESS NOTES
[facility]        NAME: Jia Armstrong is a 35 y.o. female  : 1990    MRN: [id number]  DATE: May 8, 2025  TIME: 8:10 PM    Assessment and Plan   Acute foot pain, left [M79.672]  1. Acute foot pain, left  XR foot 3+ vw left    Ambulatory Referral to Orthopedic Surgery      2. Other forms of systemic lupus erythematosus, unspecified organ involvement status (HCC)        3. History of fracture of foot  Ambulatory Referral to Orthopedic Surgery            Patient Instructions     Recommend 800 mg of Motrin up to 3x daily for pain.  Orthopedist referral placed  Follow up with Rheumatologist.  Follow up with PCP.  ER if symptoms become severe.    If tests have been performed at [facility], our office will contact you with results if changes need to be made to the care plan discussed with you at the visit. [facility]     Chief Complaint     Chief Complaint   Patient presents with    Foot Pain     L foot pain, 2nd toe mostly denies injury,  started yesterday.  Motrin no relief.          History of Present Illness       Patient is a 36 yo female who presents with acute pain of base of 2nd toe on Left foot without injury for the past 2 days. She has a hx of foot fractures and lupus. She notes that she often will have pain in her foot, which later results in a fracture. She is requesting a walking boot. Denies numbness/tingling distally. Denies color change. Denies DM. Denies any falls.        Review of Systems   Review of Systems   Constitutional: Negative.    Respiratory: Negative.     Cardiovascular: Negative.    Musculoskeletal:  Positive for gait problem.        L foot pain   Skin: Negative.          Current Medications       Current Outpatient Medications:     ALPRAZolam (XANAX) 0.5 mg tablet, Take 0.5 tablets (0.25 mg total) by mouth if needed for anxiety, Disp: 5 tablet, Rfl: 1    Ascorbic Acid (vitamin C) 1000 MG tablet, Take 1,000 mg by mouth daily, Disp: ,  Rfl:     Benlysta 200 MG/ML SOAJ, 200 mg Once a week, Disp: , Rfl:     buPROPion (WELLBUTRIN XL) 150 mg 24 hr tablet, Take 1 tablet (150 mg total) by mouth every morning To be taken with 300 mg tab for total of 450 mg daily, Disp: 90 tablet, Rfl: 0    buPROPion (WELLBUTRIN XL) 300 mg 24 hr tablet, Take 1 tablet (300 mg total) by mouth daily Tahe 450 mg daily, Disp: 90 tablet, Rfl: 0    busPIRone (BUSPAR) 10 mg tablet, Take 2 tablets (20 mg total) by mouth 2 (two) times a day, Disp: 360 tablet, Rfl: 0    Cholecalciferol (Vitamin D) 50 MCG (2000 UT) tablet, Take 2,000 Units by mouth daily, Disp: , Rfl:     clonazePAM (KlonoPIN) 0.5 mg tablet, Take 1 tablet (0.5 mg total) by mouth 3 (three) times a day, Disp: 90 tablet, Rfl: 1    Diclofenac Sodium (VOLTAREN) 1 %, Apply 2 g topically 4 (four) times a day, Disp: , Rfl:     DULoxetine (CYMBALTA) 60 mg delayed release capsule, Take 2 capsules (120 mg total) by mouth in the morning 120 mg taken evening, Disp: 180 capsule, Rfl: 0    EPINEPHrine (EPIPEN) 0.3 mg/0.3 mL SOAJ, Inject 0.3 mL (0.3 mg total) into a muscle once for 1 dose, Disp: 0.6 mL, Rfl: 0    esomeprazole (NexIUM) 40 MG capsule, TAKE 1 CAPSULE BY MOUTH 2 TIMES A DAY BEFORE MEALS., Disp: 180 capsule, Rfl: 1    gabapentin (NEURONTIN) 300 mg capsule, Take 300 mg by mouth 2 (two) times a day Take 2 caps BID, Disp: , Rfl:     hydroxychloroquine (PLAQUENIL) 200 mg tablet, Take 200 mg by mouth 2 (two) times a day with meals, Disp: , Rfl:     lidocaine (Lidoderm) 5 %, Apply 1 patch topically over 12 hours daily Remove & Discard patch within 12 hours or as directed by MD, Disp: 30 patch, Rfl: 0    lisinopril (ZESTRIL) 5 mg tablet, TAKE 1 TABLET (5 MG TOTAL) BY MOUTH DAILY., Disp: 90 tablet, Rfl: 0    mirtazapine (REMERON) 7.5 MG tablet, Take 1 tablet (7.5 mg total) by mouth daily at bedtime, Disp: 30 tablet, Rfl: 1    Norethin-Eth Estrad-Fe Biphas 1 MG-10 MCG / 10 MCG TABS, Take 1 tablet by mouth in the morning, Disp: 90  tablet, Rfl: 4    ondansetron (ZOFRAN) 4 mg tablet, Take 1 tablet (4 mg total) by mouth every 6 (six) hours as needed for nausea or vomiting, Disp: 30 tablet, Rfl: 4    Qulipta 60 MG TABS, Take 60 mg by mouth in the morning, Disp: 30 tablet, Rfl: 0    saccharomyces boulardii (FLORASTOR) 250 mg capsule, Take 250 mg by mouth 2 (two) times a day, Disp: , Rfl:     Ubrelvy 100 MG tablet, Take 1 tablet (100 mg total) by mouth once as needed (migraines) for up to 1 dose, Disp: 9 tablet, Rfl: 0    albuterol (PROVENTIL HFA,VENTOLIN HFA) 90 mcg/act inhaler, INHALE 2 PUFFS EVERY 6 HOURS AS NEEDED FOR WHEEZING (Patient not taking: Reported on 5/8/2025), Disp: 6.7 g, Rfl: 0    clindamycin-benzoyl peroxide (BENZACLIN) gel, Apply topically 2 (two) times a day, Disp: 25 g, Rfl: 0    dicyclomine (BENTYL) 20 mg tablet, TAKE 1 TABLET (20 MG TOTAL) BY MOUTH EVERY 6 HOURS AS NEEDED FOR ABDOMINAL PAIN (Patient not taking: Reported on 5/8/2025), Disp: 360 tablet, Rfl: 1    fluticasone (FLONASE) 50 mcg/act nasal spray, 1 spray into each nostril daily (Patient not taking: Reported on 5/8/2025), Disp: 16 g, Rfl: 0    Na Sulfate-K Sulfate-Mg Sulf (Suprep Bowel Prep Kit) 17.5-3.13-1.6 GM/177ML SOLN, Take 360 mL by mouth once for 1 dose, Disp: 360 mL, Rfl: 0    promethazine-dextromethorphan (PHENERGAN-DM) 6.25-15 mg/5 mL oral syrup, Take 5 mL by mouth 4 (four) times a day as needed for cough (Patient not taking: Reported on 5/8/2025), Disp: 180 mL, Rfl: 1    Current Allergies     Allergies as of 05/08/2025 - Reviewed 05/08/2025   Allergen Reaction Noted    Bee venom Anaphylaxis and Hives 07/19/2024    Amoxicillin Hives 01/20/2024    Topamax [topiramate] Delirium 01/20/2024            The following portions of the patient's history were reviewed and updated as appropriate: allergies, current medications, past family history, past medical history, past social history, past surgical history and problem list.     Past Medical History:   Diagnosis  Date    Anxiety     C. difficile colitis 01/12/2023    Calculus of gallbladder with acute cholecystitis without obstruction 02/11/2022    Added automatically from request for surgery [id number]      Depression     Gestational hypertension 09/01/2018    Hypertension     Insomnia     Lupus     Migraines        Past Surgical History:   Procedure Laterality Date    APPENDECTOMY      around 2023    OCCIPITAL NEURECTOMY      2023, occipital nerve cut as migraine treatment    REDUCTION MAMMAPLASTY  2015       Family History   Problem Relation Age of Onset    Breast cancer Mother     Colon cancer Father     Colon cancer Paternal Uncle          Medications have been verified.        Objective   /76   Pulse 81   Temp 98.7 °F (37.1 °C)   Resp 18   SpO2 99%        Physical Exam     Physical Exam  Vitals reviewed.   Constitutional:       Appearance: Normal appearance.   HENT:      Head: Normocephalic and atraumatic.   Cardiovascular:      Rate and Rhythm: Normal rate and regular rhythm.      Pulses: Normal pulses.      Heart sounds: Normal heart sounds.   Pulmonary:      Effort: Pulmonary effort is normal.      Breath sounds: Normal breath sounds.   Musculoskeletal:      Comments: L foot TTP at base of 2nd digit. No edema. No erythema.   Skin:     General: Skin is warm and dry.      Capillary Refill: Capillary refill takes less than 2 seconds.      Findings: No rash.   Neurological:      General: No focal deficit present.      Mental Status: She is alert and oriented to person, place, and time.           X-Ray of Left foot without any acute osseous abnormalities. Official read pending Radiology review.

## 2025-05-12 ENCOUNTER — TELEPHONE (OUTPATIENT)
Dept: NEUROLOGY | Facility: CLINIC | Age: 35
End: 2025-05-12

## 2025-05-12 NOTE — TELEPHONE ENCOUNTER
Called and LVM for pt in regards to confirming upcoming appt w/ Dr. Andrew. Provided pt w/ appt time, date, office address, and phone number.

## 2025-05-14 ENCOUNTER — CONSULT (OUTPATIENT)
Dept: NEUROLOGY | Facility: CLINIC | Age: 35
End: 2025-05-14
Payer: COMMERCIAL

## 2025-05-14 VITALS
WEIGHT: 237.3 LBS | HEIGHT: 66 IN | BODY MASS INDEX: 38.14 KG/M2 | HEART RATE: 78 BPM | SYSTOLIC BLOOD PRESSURE: 110 MMHG | DIASTOLIC BLOOD PRESSURE: 70 MMHG

## 2025-05-14 DIAGNOSIS — M54.2 NECK PAIN: ICD-10-CM

## 2025-05-14 DIAGNOSIS — G43.809 OTHER MIGRAINE WITHOUT STATUS MIGRAINOSUS, NOT INTRACTABLE: ICD-10-CM

## 2025-05-14 DIAGNOSIS — E67.0 HYPERVITAMINOSIS A: ICD-10-CM

## 2025-05-14 DIAGNOSIS — G43.E01 CHRONIC MIGRAINE WITH AURA AND WITH STATUS MIGRAINOSUS, NOT INTRACTABLE: Primary | ICD-10-CM

## 2025-05-14 PROCEDURE — 99205 OFFICE O/P NEW HI 60 MIN: CPT | Performed by: PSYCHIATRY & NEUROLOGY

## 2025-05-14 RX ORDER — KETOROLAC TROMETHAMINE 30 MG/ML
30 INJECTION, SOLUTION INTRAMUSCULAR; INTRAVENOUS DAILY PRN
Qty: 10 ML | Refills: 1 | Status: SHIPPED | OUTPATIENT
Start: 2025-05-14

## 2025-05-14 RX ORDER — ATOGEPANT 60 MG/1
60 TABLET ORAL DAILY
Qty: 90 TABLET | Refills: 3 | Status: SHIPPED | OUTPATIENT
Start: 2025-05-14 | End: 2025-06-13

## 2025-05-14 RX ORDER — UBROGEPANT 100 MG/1
100 TABLET ORAL ONCE AS NEEDED
Qty: 9 TABLET | Refills: 5 | Status: SHIPPED | OUTPATIENT
Start: 2025-05-14 | End: 2025-05-14

## 2025-05-14 RX ORDER — CYCLOBENZAPRINE HCL 5 MG
TABLET ORAL
Qty: 20 TABLET | Refills: 1 | Status: SHIPPED | OUTPATIENT
Start: 2025-05-14

## 2025-05-14 RX ORDER — UBROGEPANT 100 MG/1
TABLET ORAL
Qty: 9 TABLET | Refills: 5 | Status: SHIPPED | OUTPATIENT
Start: 2025-05-14

## 2025-05-14 RX ORDER — SYRINGE W-NEEDLE,DISPOSAB,3 ML 25GX5/8"
SYRINGE, EMPTY DISPOSABLE MISCELLANEOUS
Qty: 10 EACH | Refills: 0 | Status: SHIPPED | OUTPATIENT
Start: 2025-05-14

## 2025-05-14 NOTE — PROGRESS NOTES
"Name: Jia Armstrong      : 1990      MRN: [id number]  Encounter Provider: Hi Andrew MD  Encounter Date: 2025   Encounter department: [facility] [place]  :  Assessment & Plan  Chronic migraine with aura and with status migrainosus, not intractable    Orders:  •  MRI cervical spine with and without contrast; Future  •  Diclofenac Sodium (VOLTAREN) 1 %; Apply 2 g topically 4 (four) times a day as needed (migarine or neck pain)  •  ketorolac (TORADOL) 30 mg/mL injection; Inject 1 mL (30 mg total) into a muscle daily as needed (Migraine, max 2 days per week)  •  Syringe/Needle, Disp, (SYRINGE 3CC/56EE6-7/4\") 27G X 1-\" 3 ML MISC; Use for Toradol intramuscular injections.  •  cyclobenzaprine (FLEXERIL) 5 mg tablet; 1/2 to 2 tabs once daily if needed for migraine, may repeat dose X1 HS if necessary, max 3 days per week  •  MRI brain without contrast; Future    Neck pain    Orders:  •  MRI cervical spine with and without contrast; Future    Other migraine without status migrainosus, not intractable    Orders:  •  Qulipta 60 MG TABS; Take 60 mg by mouth in the morning  •  Diclofenac Sodium (VOLTAREN) 1 %; Apply 2 g topically 4 (four) times a day as needed (migarine or neck pain)  •  ketorolac (TORADOL) 30 mg/mL injection; Inject 1 mL (30 mg total) into a muscle daily as needed (Migraine, max 2 days per week)  •  Syringe/Needle, Disp, (SYRINGE 3CC/82XC3-7/4\") 27G X 1-\" 3 ML MISC; Use for Toradol intramuscular injections.  •  cyclobenzaprine (FLEXERIL) 5 mg tablet; 1/2 to 2 tabs once daily if needed for migraine, may repeat dose X1 HS if necessary, max 3 days per week  •  Ubrelvy 100 MG tablet; 1 tab as needed for migraine, may repeat X1 in 2 hours, max 2 uses in 24 hours    Hypervitaminosis A    Orders:  •  MRI brain without contrast; Future      {Ambulatory Patient Instructions (Optional):96519}    History of Present Illness {?Quick Links Encounters * My Last Note * Last Note " "in Specialty * Snapshot * Since Last Visit * History :17398}  Migraine  Associated symptoms include neck pain. Pertinent negatives include no back pain, dizziness, eye pain, fever, hearing loss, nausea, numbness, photophobia, seizures, tinnitus, vomiting or weakness.      Review of Systems   Constitutional:  Negative for appetite change, fatigue and fever.   HENT: Negative.  Negative for hearing loss, tinnitus, trouble swallowing and voice change.    Eyes: Negative.  Negative for photophobia, pain and visual disturbance.   Respiratory: Negative.  Negative for shortness of breath.    Cardiovascular: Negative.  Negative for palpitations.   Gastrointestinal: Negative.  Negative for nausea and vomiting.   Endocrine: Negative.  Negative for cold intolerance.   Genitourinary: Negative.  Negative for dysuria, frequency and urgency.   Musculoskeletal:  Positive for neck pain. Negative for back pain, gait problem, myalgias and neck stiffness.   Skin: Negative.  Negative for rash.   Allergic/Immunologic: Negative.    Neurological:  Positive for headaches. Negative for dizziness, tremors, seizures, syncope, facial asymmetry, speech difficulty, weakness, light-headedness and numbness.   Hematological: Negative.  Does not bruise/bleed easily.   Psychiatric/Behavioral: Negative.  Negative for confusion, hallucinations and sleep disturbance.     I have personally reviewed the MA's review of systems and made changes as necessary.    {Select to insert medical history sections (Optional):75368}     Objective {?Quick Links Trend Vitals * Enter New Vitals * Results Review * Timeline (Adult) * Labs * Imaging * Cardiology * Procedures * Lung Cancer Screening * Surgical eConsent :94517}  /70 (BP Location: Left arm, Patient Position: Sitting, Cuff Size: Large)   Pulse 78   Ht 5' 6\" (1.676 m)   Wt 108 kg (237 lb 4.8 oz)   BMI 38.30 kg/m²     Physical Exam  Neurological Exam    {Radiology Results Review " (Optional):68390}    {Administrative / Billing Section (Optional):07365}

## 2025-05-14 NOTE — ASSESSMENT & PLAN NOTE
Patient Instructions   Chronic migraine, neck pain, hypervitaminosis A: Jia presents for an initial consultation with regard to a long history of migraine which qualifies for diagnosis of chronic migraine based on our discussion today.  She is thankfully doing reasonably well on her current combination of medications.  Her abortive treatment is providing only partial relief and we will do some things to adjust it.  She does have persistent neck pain in spite of completing a course of physical therapy for her neck and has asymmetric reflexes in the office today.  She also reports a history of hypervitaminosis A and spite of no supplementation.  - For migraine prevention she should continue her combination of Qulipta, lisinopril, and her other medications as currently constituted  - Migraine can be responsive to adequate rest, hydration, and regular physical exercise all of which I would recommend for her  - It would be reasonable to look into a migraine vitamin supplements.  She should look for supplements that contains 1 or more of the following ingredients (magnesium, riboflavin/vitamin B2, coenzyme every 10, feverfew).  This should be used according to package instructions.  She can send me a screenshot of the label if she is looking at an option and she is not certain if it would be appropriate  - To treat a migraine as it is occurring she should use 1 tablet of Ubrelvy taken as early as possible.  That could be repeated once in 2 hours with a maximum of 2 uses in 24 hours.  For a more severe migraine she could add 1 injection of Toradol into the muscle at 30 mg, or for more mild or moderate migraine she could use 3 tablets of over-the-counter ibuprofen or 2 tablets of over-the-counter naproxen, and needed above could be taken with the Ubrelvy.  I will also provide her with a prescription for cyclobenzaprine at 5 mg.  She can use 1/2 tablet, 1 full tablet, or up to 2 tablets at a time to treat a migraine.  They  "can be used in conjunction with the medications above.  She should not drive after taking this medication until she knows how it affects her.  It can be used 1 time to treat the migraine as it is occurring and she can use a second dose if necessary once per day at bedtime  - While we are making these adjustments I would like for her to keep track of the frequency of her migraines, both full-blown migraines and any lesser migraines that she may be experiencing  - Because of her persistent neck pain and asymmetric reflexes we will request an MRI of the cervical spine  - Given her history of hypervitaminosis A and a refractory migraine syndrome in the past it would be reasonable to do an MRI to rule out any signs or symptoms concerning for elevated intracranial pressure although her exam in that regard was relatively reassuring in the office today  - I will also refill her prescription for Voltaren gel which can be used on an as-needed basis to treat neck pain or migraine although she should be careful using it in conjunction with either oral NSAIDs or the injectable Toradol    I will be in touch with her with regard to the results of her studies once they are available and she will return to the office to see me directly in 6 months but I would be happy to see her sooner if the need should arise.    Orders:  •  MRI cervical spine with and without contrast; Future  •  Diclofenac Sodium (VOLTAREN) 1 %; Apply 2 g topically 4 (four) times a day as needed (migarine or neck pain)  •  ketorolac (TORADOL) 30 mg/mL injection; Inject 1 mL (30 mg total) into a muscle daily as needed (Migraine, max 2 days per week)  •  Syringe/Needle, Disp, (SYRINGE 3CC/08JZ0-6/4\") 27G X 1-1/4\" 3 ML MISC; Use for Toradol intramuscular injections.  •  cyclobenzaprine (FLEXERIL) 5 mg tablet; 1/2 to 2 tabs once daily if needed for migraine, may repeat dose X1 HS if necessary, max 3 days per week  •  MRI brain without contrast; Future  "

## 2025-05-14 NOTE — TELEPHONE ENCOUNTER
Called [facility] and left VM on providers line informing pharmacy that it is 120 mg daily. Requested they call back if they need further clarification. Please refuse request as it is a duplicate.

## 2025-05-14 NOTE — PROGRESS NOTES
Name: Jia Armstrong      : 1990      MRN: [id number]  Encounter Provider: Hi Andrew MD  Encounter Date: 2025   Encounter department: [facility]  :  Assessment & Plan  Chronic migraine with aura and with status migrainosus, not intractable  Patient Instructions   Chronic migraine, neck pain, hypervitaminosis A: Jia presents for an initial consultation with regard to a long history of migraine which qualifies for diagnosis of chronic migraine based on our discussion today.  She is thankfully doing reasonably well on her current combination of medications.  Her abortive treatment is providing only partial relief and we will do some things to adjust it.  She does have persistent neck pain in spite of completing a course of physical therapy for her neck and has asymmetric reflexes in the office today.  She also reports a history of hypervitaminosis A and spite of no supplementation.  - For migraine prevention she should continue her combination of Qulipta, lisinopril, and her other medications as currently constituted  - Migraine can be responsive to adequate rest, hydration, and regular physical exercise all of which I would recommend for her  - It would be reasonable to look into a migraine vitamin supplements.  She should look for supplements that contains 1 or more of the following ingredients (magnesium, riboflavin/vitamin B2, coenzyme every 10, feverfew).  This should be used according to package instructions.  She can send me a screenshot of the label if she is looking at an option and she is not certain if it would be appropriate  - To treat a migraine as it is occurring she should use 1 tablet of Ubrelvy taken as early as possible.  That could be repeated once in 2 hours with a maximum of 2 uses in 24 hours.  For a more severe migraine she could add 1 injection of Toradol into the muscle at 30 mg, or for more mild or moderate migraine she could use 3 tablets  of over-the-counter ibuprofen or 2 tablets of over-the-counter naproxen, and needed above could be taken with the Ubrelvy.  I will also provide her with a prescription for cyclobenzaprine at 5 mg.  She can use 1/2 tablet, 1 full tablet, or up to 2 tablets at a time to treat a migraine.  They can be used in conjunction with the medications above.  She should not drive after taking this medication until she knows how it affects her.  It can be used 1 time to treat the migraine as it is occurring and she can use a second dose if necessary once per day at bedtime  - While we are making these adjustments I would like for her to keep track of the frequency of her migraines, both full-blown migraines and any lesser migraines that she may be experiencing  - Because of her persistent neck pain and asymmetric reflexes we will request an MRI of the cervical spine  - Given her history of hypervitaminosis A and a refractory migraine syndrome in the past it would be reasonable to do an MRI to rule out any signs or symptoms concerning for elevated intracranial pressure although her exam in that regard was relatively reassuring in the office today  - I will also refill her prescription for Voltaren gel which can be used on an as-needed basis to treat neck pain or migraine although she should be careful using it in conjunction with either oral NSAIDs or the injectable Toradol    I will be in touch with her with regard to the results of her studies once they are available and she will return to the office to see me directly in 6 months but I would be happy to see her sooner if the need should arise.    Orders:  •  MRI cervical spine with and without contrast; Future  •  Diclofenac Sodium (VOLTAREN) 1 %; Apply 2 g topically 4 (four) times a day as needed (migarine or neck pain)  •  ketorolac (TORADOL) 30 mg/mL injection; Inject 1 mL (30 mg total) into a muscle daily as needed (Migraine, max 2 days per week)  •  Syringe/Needle, Disp,  "(SYRINGE 3CC/65SV3-2/4\") 27G X 1-1/4\" 3 ML MISC; Use for Toradol intramuscular injections.  •  cyclobenzaprine (FLEXERIL) 5 mg tablet; 1/2 to 2 tabs once daily if needed for migraine, may repeat dose X1 HS if necessary, max 3 days per week  •  MRI brain without contrast; Future    Neck pain    Orders:  •  MRI cervical spine with and without contrast; Future    Other migraine without status migrainosus, not intractable    Orders:  •  Qulipta 60 MG TABS; Take 60 mg by mouth in the morning  •  Diclofenac Sodium (VOLTAREN) 1 %; Apply 2 g topically 4 (four) times a day as needed (migarine or neck pain)  •  ketorolac (TORADOL) 30 mg/mL injection; Inject 1 mL (30 mg total) into a muscle daily as needed (Migraine, max 2 days per week)  •  Syringe/Needle, Disp, (SYRINGE 3CC/01JL7-6/4\") 27G X 1-1/4\" 3 ML MISC; Use for Toradol intramuscular injections.  •  cyclobenzaprine (FLEXERIL) 5 mg tablet; 1/2 to 2 tabs once daily if needed for migraine, may repeat dose X1 HS if necessary, max 3 days per week  •  Ubrelvy 100 MG tablet; 1 tab as needed for migraine, may repeat X1 in 2 hours, max 2 uses in 24 hours    Hypervitaminosis A    Orders:  •  MRI brain without contrast; Future      Assessment & Plan          History of Present Illness   History of Present Illness  The patient presents for evaluation of chronic migraine, neck pain, and hypervitaminosis A.    She has a long-standing history of migraines, dating back to infancy. Initially, she experienced abdominal migraines accompanied by vomiting and general malaise. At the age of 10, she began experiencing headaches and abdominal migraines, which progressively worsened in frequency and intensity. Despite extensive consultations with neurologists and various treatments including Botox and medications, the cause of her persistent migraines remained elusive. During a visit to her  in [place], she discovered that applying pressure to a specific spot on the back of her neck " provided immediate relief. This led her to research occipital neuralgia, a condition that resonated with her symptoms. She underwent occipital nerve decompression surgery, which provided a year of relief from her daily migraines. However, the migraines gradually returned. A second surgery was performed to create a closed circuit of the nerves, resulting in significant numbness on her head.     Her migraines are characterized by confusion, body heaviness, and significant delay. With Qulipta, she experiences migraines about once a week, compared to daily occurrences without the medication. During such times the intensity of her migraines varies throughout the day, but they are never completely absent. She often experiences neck muscle tightness prior to a migraine. Migraines can last from a few hours to a full day and are typically rated as 6 out of 10 in intensity. The pain is widespread, affecting the front, sides, and back of her head, as well as her neck and shoulders. She describes the pain as throbbing, pulsing, aching, band-like, dull, and stabbing. Migraines are often accompanied by insomnia, stiffness, sore neck, trouble concentrating, and sensitivity to light and sound. She also reports confusion and difficulty focusing during a migraine. Triggers include sunlight, stress, missed meals, and weather changes. She has difficulty identifying consistent triggers post-surgery.    She has tried various preventive treatments including Botox, BuSpar, Celebrex, Celexa, CoQ10, Cymbalta, Depakote, Effexor, Elavil, Flexeril, Inderal, Klonopin, Lexapro, Lyrica, magnesium, melatonin, nortriptyline, Periactin, Paxil, Topamax, trigger point injections, Wellbutrin, Xanax, Ajovy, Vyepti, Qulipta, and Ubrelvy. She has also tried sumatriptan and rizatriptan for abortive treatment. In 2015, she participated in an inpatient clinical trial at [facility], which involved DHE and Toradol injections, but this  resulted in extreme delirium, hallucinations, and nightmares. She was diagnosed with pneumonia with pleurisy following the trial.       She has not tried Nurtec ODT, Zavzpret, lasmiditan/Reyvow, she may have tried Migrainal but not Trudhesa. She often wakes up with a migraine and has used Toradol injections for severe migraines. She is currently on Qulipta and Ubrelvy for her migraines. She finds Ubrelvy effective if taken early enough and usually takes it alone. She uses Voltaren gel on her neck and takes Toradol for severe migraines. She also takes Motrin at the onset of a migraine. She has noticed a decrease in nausea with her migraines. She is interested in trying CoQ10, riboflavin, and magnesium supplements. She is also currently on lisinopril for her migraines.    She reports persistent neck pain, which has been worsening over time. She recently had imaging done on her neck by a rheumatologist at [facility], which revealed an issue in the area of her pain (I was able to review results, this was an x-ray with flexion and extension which did show some anterolisthesis at C4-C5 and C5-C6, there may have been some areas of calcification or narrowing at the level of the foramina). She experiences occasional tingling and numbness in her shoulders and arms. She has been seeing a chiropractor for the past two years without relief. She describes the pain as deep muscle or bone pain, which becomes sharp when her muscles tighten. She has previously seen a pain management specialist and received nerve blocks, which were helpful.    She has been diagnosed with vitamin A toxicity and is scheduled to see her primary care physician on Friday. She does not take any vitamin A supplements or consume foods rich in vitamin A. She does not use retinol creams. She has been referred to an endocrinologist and a nephrologist, but the cause of her body's retention of vitamin A remains unknown.    PAST SURGICAL HISTORY: Occipital  "nerve decompression surgery, occipital nerve surgery to create a closed circuit.    She confirms that her migraines are not positional    SOCIAL HISTORY  Marital Status:   Sleep: Frequently wakes up with migraines; no specific sleep habits or hygiene mentioned.    FAMILY HISTORY  - Brother: Migraines, classic form, treated with rescue medication, a few episodes a year.  - Two half-sisters: Migraines, classic form.  - Daughter: Headaches and migraines, started recently.    MEDICATIONS  CURRENT MEDS:  Qulipta Oral Daily  BuSpar Oral  Cymbalta Oral  Klonopin Oral  Wellbutrin Oral  Xanax Oral  Ubrelvy Oral As needed  Lisinopril Oral    PREVIOUS MEDS:  Botox  Celebrex  Celexa  CoQ10  Depakote  Effexor  Elavil  Flexeril  Inderal  Lexapro  Lyrica  Magnesium  Melatonin  Nortriptyline  Periactin  Paxil  Topamax  Ajovy     Review of Systems I have personally reviewed the MA's review of systems and made changes as necessary.         Objective   /70 (BP Location: Left arm, Patient Position: Sitting, Cuff Size: Large)   Pulse 78   Ht 5' 6\" (1.676 m)   Wt 108 kg (237 lb 4.8 oz)   BMI 38.30 kg/m²     Physical Exam  Neurological Exam  Physical Exam  At the time my examination the patient was awake, alert, and in no distress.  They are an excellent historian.  There is no dysarthria or aphasia.  Cranial nerves 2-12 were symmetrically intact bilaterally.  Limited funduscopic exam did not reveal any papilledema.  Motor testing reveals 5/5 strength throughout the bilateral upper lower extremities.  There is no drift.  Finger-to-nose with eyes closed is intact indicating relatively preserved proprioceptive function.  With eyes open there is no dysmetria or ataxia.  Sensation is intact to temperature and vibration throughout the bilateral upper extremities and at the level of the patella bilaterally.  Deep tendon reflexes were asymmetric, decreased in the right upper and lower extremity compared with the left.  Their " gait was stable.  There was an orthopedic boot on the left foot        Results  Imaging   - X-ray of the neck: 02/2025, There is a little bit of straightening of the normal curvature of the neck and some of the bones in the neck are not sitting straight directly on top of each other. C4 is sitting just a little bit forward compared to C5 and C5 is just a little forward compared to C6. There is a little bit of calcification in the little holes that the nerves leave through.      Administrative Statements   I have spent a total time of 65 minutes in caring for this patient on the day of the visit/encounter including Prognosis, Risks and benefits of tx options, Instructions for management, Impressions, Counseling / Coordination of care, Documenting in the medical record, Reviewing/placing orders in the medical record (including tests, medications, and/or procedures), and Obtaining or reviewing history  .

## 2025-05-14 NOTE — PATIENT INSTRUCTIONS
Chronic migraine, neck pain, hypervitaminosis A: Jia presents for an initial consultation with regard to a long history of migraine which qualifies for diagnosis of chronic migraine based on our discussion today.  She is thankfully doing reasonably well on her current combination of medications.  Her abortive treatment is providing only partial relief and we will do some things to adjust it.  She does have persistent neck pain in spite of completing a course of physical therapy for her neck and has asymmetric reflexes in the office today.  She also reports a history of hypervitaminosis A and spite of no supplementation.  - For migraine prevention she should continue her combination of Qulipta, lisinopril, and her other medications as currently constituted  - Migraine can be responsive to adequate rest, hydration, and regular physical exercise all of which I would recommend for her  - It would be reasonable to look into a migraine vitamin supplements.  She should look for supplements that contains 1 or more of the following ingredients (magnesium, riboflavin/vitamin B2, coenzyme every 10, feverfew).  This should be used according to package instructions.  She can send me a screenshot of the label if she is looking at an option and she is not certain if it would be appropriate  - To treat a migraine as it is occurring she should use 1 tablet of Ubrelvy taken as early as possible.  That could be repeated once in 2 hours with a maximum of 2 uses in 24 hours.  For a more severe migraine she could add 1 injection of Toradol into the muscle at 30 mg, or for more mild or moderate migraine she could use 3 tablets of over-the-counter ibuprofen or 2 tablets of over-the-counter naproxen, and needed above could be taken with the Ubrelvy.  I will also provide her with a prescription for cyclobenzaprine at 5 mg.  She can use 1/2 tablet, 1 full tablet, or up to 2 tablets at a time to treat a migraine.  They can be used in  conjunction with the medications above.  She should not drive after taking this medication until she knows how it affects her.  It can be used 1 time to treat the migraine as it is occurring and she can use a second dose if necessary once per day at bedtime  - While we are making these adjustments I would like for her to keep track of the frequency of her migraines, both full-blown migraines and any lesser migraines that she may be experiencing  - Because of her persistent neck pain and asymmetric reflexes we will request an MRI of the cervical spine  - Given her history of hypervitaminosis A and a refractory migraine syndrome in the past it would be reasonable to do an MRI to rule out any signs or symptoms concerning for elevated intracranial pressure although her exam in that regard was relatively reassuring in the office today  - I will also refill her prescription for Voltaren gel which can be used on an as-needed basis to treat neck pain or migraine although she should be careful using it in conjunction with either oral NSAIDs or the injectable Toradol    I will be in touch with her with regard to the results of her studies once they are available and she will return to the office to see me directly in 6 months but I would be happy to see her sooner if the need should arise.

## 2025-05-14 NOTE — ASSESSMENT & PLAN NOTE
"  Orders:  •  MRI cervical spine with and without contrast; Future  •  Diclofenac Sodium (VOLTAREN) 1 %; Apply 2 g topically 4 (four) times a day as needed (migarine or neck pain)  •  ketorolac (TORADOL) 30 mg/mL injection; Inject 1 mL (30 mg total) into a muscle daily as needed (Migraine, max 2 days per week)  •  Syringe/Needle, Disp, (SYRINGE 3CC/85OS9-4/4\") 27G X 1-1/4\" 3 ML MISC; Use for Toradol intramuscular injections.  •  cyclobenzaprine (FLEXERIL) 5 mg tablet; 1/2 to 2 tabs once daily if needed for migraine, may repeat dose X1 HS if necessary, max 3 days per week  •  MRI brain without contrast; Future    "

## 2025-05-15 ENCOUNTER — TELEPHONE (OUTPATIENT)
Dept: NEUROLOGY | Facility: CLINIC | Age: 35
End: 2025-05-15

## 2025-05-15 ENCOUNTER — OFFICE VISIT (OUTPATIENT)
Dept: FAMILY MEDICINE CLINIC | Facility: CLINIC | Age: 35
End: 2025-05-15
Payer: COMMERCIAL

## 2025-05-15 VITALS
OXYGEN SATURATION: 98 % | HEART RATE: 79 BPM | WEIGHT: 235.8 LBS | DIASTOLIC BLOOD PRESSURE: 68 MMHG | SYSTOLIC BLOOD PRESSURE: 108 MMHG | TEMPERATURE: 97.7 F | HEIGHT: 66 IN | BODY MASS INDEX: 37.9 KG/M2

## 2025-05-15 DIAGNOSIS — F33.2 MAJOR DEPRESSIVE DISORDER, RECURRENT SEVERE WITHOUT PSYCHOTIC FEATURES (HCC): ICD-10-CM

## 2025-05-15 DIAGNOSIS — E67.0 HYPERVITAMINOSIS A: Primary | ICD-10-CM

## 2025-05-15 DIAGNOSIS — M32.8 OTHER FORMS OF SYSTEMIC LUPUS ERYTHEMATOSUS, UNSPECIFIED ORGAN INVOLVEMENT STATUS (HCC): ICD-10-CM

## 2025-05-15 DIAGNOSIS — G43.E01 CHRONIC MIGRAINE WITH AURA AND WITH STATUS MIGRAINOSUS, NOT INTRACTABLE: ICD-10-CM

## 2025-05-15 DIAGNOSIS — K21.9 GASTROESOPHAGEAL REFLUX DISEASE WITHOUT ESOPHAGITIS: ICD-10-CM

## 2025-05-15 PROCEDURE — 99213 OFFICE O/P EST LOW 20 MIN: CPT

## 2025-05-15 NOTE — ASSESSMENT & PLAN NOTE
Following with  psychiatry.   Currently on wellbutrin, cymbalta, buspar, and remeron   Mood stable  Continue specialty follow up

## 2025-05-15 NOTE — PROGRESS NOTES
Name: Jia Armstrong      : 1990      MRN: [id number]  Encounter Provider: Sydney Riggs PA-C  Encounter Date: 5/15/2025   Encounter department: [facility]  :  Assessment & Plan  Hypervitaminosis A  Continues with elevated vitamin A levels of unknown origin. Last checked on 3/24/25 by her rheumatologist and was elevated at 98  She has seen a nutritionist who ruled out dietary excess. She does not take any current supplements.   She did see neurology recently who mentioned a possible linkage with idiopathic intracranial HTN, she has an MRI of the brain to further evaluate this  She has active orders for retinol binding protein, heavy metal, and lyme testing -- I encouraged her to get these completed for further evaluation   Would also recommend she see a toxicology/diagnostician at this point, given multiple speciality evaluations with continuous unknown origin. Referral placed.     Orders:    Ambulatory Referral to Toxicology; Future    Chronic migraine with aura and with status migrainosus, not intractable  Following with Neurology for management.   Currently on Qulipta, Ubrelvy       Gastroesophageal reflux disease without esophagitis  Well controlled on Nexium 40 mg QD  Managed by GI   Recent EGD       Major depressive disorder, recurrent severe without psychotic features (HCC)  Following with  psychiatry.   Currently on wellbutrin, cymbalta, buspar, and remeron   Mood stable  Continue specialty follow up          Other forms of systemic lupus erythematosus, unspecified organ involvement status (HCC)  Follows with Rheumatology -- upcoming apt this Monday  Currently on plaquenil and benlysta          Follow up in three months. Sooner as needed. To call with any questions/concerns.      History of Present Illness       Patient with SLE, migraines, depression, GERD presents for follow up  Since our last visit, she established with Neurology, Dr. Andrew for management of her  "chronic migraines. She also discussed her elevated vitamin A level, reports he told her it may be related to intracranial HTN and they are checking an MRI  Pt has seen nutritionist and ruled out nutrition cause of elevated vitamin A  She follows with rheumatology for management of SLE. They do not know why she has high levels of vitamin A.   Nephrology and endocrinology refused to see patient, felt it did not have endo or nephro relation   Patient is interested in seeing an outside network diagnostician which I feel is a good idea  She has not gotten labs I ordered completed yet (Rbp, heavy metal, lyme) but will asap     She did see GI since our last visit. EGD/colonoscopy unremarkable. She was recently started her Remeron from psychiatry and feels her appetite has improved.     She has no acute complaints today.         Review of Systems   Respiratory:  Negative for chest tightness and shortness of breath.    Cardiovascular:  Negative for chest pain.   Neurological:  Positive for headaches. Negative for light-headedness.       Objective   /68   Pulse 79   Temp 97.7 °F (36.5 °C)   Ht 5' 6\" (1.676 m)   Wt 107 kg (235 lb 12.8 oz)   SpO2 98%   BMI 38.06 kg/m²      Physical Exam  Vitals reviewed.   Constitutional:       General: She is not in acute distress.     Appearance: Normal appearance. She is not ill-appearing or diaphoretic.   HENT:      Head: Normocephalic and atraumatic.      Right Ear: External ear normal.      Left Ear: External ear normal.      Nose: Nose normal.      Mouth/Throat:      Mouth: Mucous membranes are moist.     Eyes:      Conjunctiva/sclera: Conjunctivae normal.       Cardiovascular:      Rate and Rhythm: Normal rate and regular rhythm.      Heart sounds: Normal heart sounds. No murmur heard.  Pulmonary:      Effort: Pulmonary effort is normal. No respiratory distress.      Breath sounds: Normal breath sounds. No wheezing, rhonchi or rales.     Skin:     General: Skin is warm. "     Neurological:      General: No focal deficit present.      Mental Status: She is alert.      Gait: Gait normal.     Psychiatric:         Mood and Affect: Mood normal.

## 2025-05-15 NOTE — TELEPHONE ENCOUNTER
Received via [employer] from [facility] Information Management notification they are unable to process your request.  Notification scanned into .

## 2025-05-16 ENCOUNTER — OFFICE VISIT (OUTPATIENT)
Dept: OBGYN CLINIC | Facility: CLINIC | Age: 35
End: 2025-05-16
Payer: COMMERCIAL

## 2025-05-16 VITALS — HEIGHT: 66 IN | WEIGHT: 237 LBS | BODY MASS INDEX: 38.09 KG/M2

## 2025-05-16 DIAGNOSIS — S90.32XA CONTUSION OF LEFT FOOT, INITIAL ENCOUNTER: Primary | ICD-10-CM

## 2025-05-16 DIAGNOSIS — M76.821 POSTERIOR TIBIAL TENDON DYSFUNCTION (PTTD) OF BOTH LOWER EXTREMITIES: ICD-10-CM

## 2025-05-16 DIAGNOSIS — M76.822 POSTERIOR TIBIAL TENDON DYSFUNCTION (PTTD) OF BOTH LOWER EXTREMITIES: ICD-10-CM

## 2025-05-16 PROCEDURE — 99203 OFFICE O/P NEW LOW 30 MIN: CPT | Performed by: FAMILY MEDICINE

## 2025-05-16 RX ORDER — PREDNISONE 20 MG/1
20 TABLET ORAL 2 TIMES DAILY WITH MEALS
Qty: 10 TABLET | Refills: 0 | Status: SHIPPED | OUTPATIENT
Start: 2025-05-16 | End: 2025-05-21

## 2025-05-16 NOTE — ASSESSMENT & PLAN NOTE
35-year-old female onset left foot pain and numbness from injury 5/7/2025.  X-rays left foot unremarkable for acute osseous abnormality or significant degenerative changes.  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  I discussed treatment regimen of rest/stabilization, anti-inflammatory, and supportive wear.  I provided a cam boot which she is to wear for the next 2 weeks.  Take prednisone 20 mg twice daily with food for 5 days.  Apply topical diclofenac 3 times a day for the next 10 days.  May do Epsom salt soaks.  Take turmeric, tart cherry, and glucosamine supplements.  There is noted pronation and pes planus so I will refer her to physical therapy orthotist to be evaluated for custom orthotics.  Follow-up as needed.  Orders:    predniSONE 20 mg tablet; Take 1 tablet (20 mg total) by mouth 2 (two) times a day with meals for 5 days    Cam Boot     Rounding completed, pt appears comfortable at this time. Side rails X2 remain in proper position and bed in lowest position and locked. Awaiting discharge instructions. No other needs at this time, call light within reach. Will continue to monitor pt. Family remains bedside.

## 2025-05-16 NOTE — PATIENT INSTRUCTIONS
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

## 2025-05-16 NOTE — PROGRESS NOTES
Name: Jia Armstrong      : 1990      MRN: [id number]  Encounter Provider: Maria Ines Phoenix DO  Encounter Date: 2025   Encounter department: [facility] [place]  :  Assessment & Plan  Contusion of left foot, initial encounter  35-year-old female onset left foot pain and numbness from injury 2025.  X-rays left foot unremarkable for acute osseous abnormality or significant degenerative changes.  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  I discussed treatment regimen of rest/stabilization, anti-inflammatory, and supportive wear.  I provided a cam boot which she is to wear for the next 2 weeks.  Take prednisone 20 mg twice daily with food for 5 days.  Apply topical diclofenac 3 times a day for the next 10 days.  May do Epsom salt soaks.  Take turmeric, tart cherry, and glucosamine supplements.  There is noted pronation and pes planus so I will refer her to physical therapy orthotist to be evaluated for custom orthotics.  Follow-up as needed.  Orders:    predniSONE 20 mg tablet; Take 1 tablet (20 mg total) by mouth 2 (two) times a day with meals for 5 days    Cam Boot    Posterior tibial tendon dysfunction (PTTD) of both lower extremities  35-year-old female onset left foot pain and numbness from injury 2025.  X-rays left foot unremarkable for acute osseous abnormality or significant degenerative changes.  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  I discussed treatment regimen of rest/stabilization, anti-inflammatory, and supportive wear.  I provided a cam boot which she is to wear for the next 2 weeks.  Take prednisone 20 mg twice daily with food for 5 days.  Apply topical diclofenac 3 times a day for the next 10 days.  May do Epsom salt soaks.  Take  "turmeric, tart cherry, and glucosamine supplements.  There is noted pronation and pes planus so I will refer her to physical therapy orthotist to be evaluated for custom orthotics.  Follow-up as needed.  Orders:    Ambulatory Referral to Physical Therapy; Future        History of Present Illness   Chief Complaint   Patient presents with    Left Foot - Pain, Numbness      HPI  Jia Armstrong is a 35 y.o. female who presents for evaluation of left foot pain following injury on 5/7/2025.  She was digging with a shovel when she used her left foot to push down on on the shovel.  She had pain described as sudden in onset, localized to the plantar aspect of the forefoot at the base of the second toe, achy and sore, and mild in intensity.  She continued with activity as symptoms were mild in intensity.  The next morning pain became more intense and she had throbbing pain with associated numbness and tingling.  Pain is worse with direct pressure on the area.  She presented to urgent care at which point x-rays were unremarkable for acute osseous abnormality.  She was placed in cam boot and advised to follow-up with orthopedic care.  She has been taking ibuprofen intermittently for symptoms.    History obtained from: patient    Review of Systems   Musculoskeletal:  Positive for arthralgias. Negative for joint swelling.   Neurological:  Positive for numbness. Negative for weakness.          Objective   Ht 5' 6\" (1.676 m)   Wt 108 kg (237 lb) Comment: does have post -op shoe on  BMI 38.25 kg/m²      Physical Exam  Vitals and nursing note reviewed.   Constitutional:       Appearance: Normal appearance. She is well-developed. She is not ill-appearing or diaphoretic.   HENT:      Head: Normocephalic and atraumatic.      Right Ear: External ear normal.      Left Ear: External ear normal.     Eyes:      Conjunctiva/sclera: Conjunctivae normal.     Neck:      Trachea: No tracheal deviation.   Pulmonary:      Effort: Pulmonary effort " is normal. No respiratory distress.   Abdominal:      General: There is no distension.     Musculoskeletal:         General: Tenderness present.     Skin:     General: Skin is warm and dry.      Coloration: Skin is not jaundiced or pale.     Neurological:      Mental Status: She is alert and oriented to person, place, and time.     Psychiatric:         Mood and Affect: Mood normal.         Behavior: Behavior normal.         Thought Content: Thought content normal.         Judgment: Judgment normal.       Left Ankle Exam   Swelling: none    Range of Motion   The patient has normal left ankle ROM.     Muscle Strength   Dorsiflexion:  5/5   Plantar flexion:  5/5     Other   Sensation: normal  Pulse: present    Comments:  5/5 inversion and eversion    Foot  - Tenderness plantar aspect head of second metatarsal  - No pain with resisted second toe flexion or second toe extension  - No pain with metatarsal squeeze             Administrative Statements   I have spent a total time of 30 minutes in caring for this patient on the day of the visit/encounter including Diagnostic results, Prognosis, Risks and benefits of tx options, Instructions for management, Importance of tx compliance, Impressions, Documenting in the medical record, Reviewing/placing orders in the medical record (including tests, medications, and/or procedures), and Obtaining or reviewing history  .

## 2025-05-21 ENCOUNTER — TELEPHONE (OUTPATIENT)
Dept: NEUROLOGY | Facility: CLINIC | Age: 35
End: 2025-05-21

## 2025-05-29 ENCOUNTER — EVALUATION (OUTPATIENT)
Dept: PHYSICAL THERAPY | Facility: CLINIC | Age: 35
End: 2025-05-29
Attending: FAMILY MEDICINE
Payer: COMMERCIAL

## 2025-05-29 DIAGNOSIS — M76.822 POSTERIOR TIBIAL TENDON DYSFUNCTION (PTTD) OF BOTH LOWER EXTREMITIES: Primary | ICD-10-CM

## 2025-05-29 DIAGNOSIS — M76.821 POSTERIOR TIBIAL TENDON DYSFUNCTION (PTTD) OF BOTH LOWER EXTREMITIES: Primary | ICD-10-CM

## 2025-05-29 PROCEDURE — 97760 ORTHOTIC MGMT&TRAING 1ST ENC: CPT

## 2025-05-29 RX ORDER — DULOXETIN HYDROCHLORIDE 60 MG/1
120 CAPSULE, DELAYED RELEASE ORAL DAILY
Qty: 180 CAPSULE | Refills: 0 | OUTPATIENT
Start: 2025-05-29

## 2025-05-29 NOTE — PROGRESS NOTES
Orthotic Evaluation    Today's date: 25  Patient name: Jia Armstrong  : 1990  MRN: [id number]  Referring provider: Maria Ines Phoenix*  Dx:   Encounter Diagnosis     ICD-10-CM    1. Posterior tibial tendon dysfunction (PTTD) of both lower extremities  M76.821     M76.822           Start Time: 1515  Stop Time: 1600  Total time in clinic (min): 45 minutes     Subjective:    Jia presents today for orthotic evaluation. she complains of pain, decreased strength, decreased ROM, and ambulatory dysfunction with functional activities including ambulation, transfers, leisure, athletics, and work. The patient plans to use her orthotic for walking and standing. The orthotic will be placed in a standard, size 8 sneakers.    Objective:    Age: 35 y.o.  Weight: 237lbs    Foot Posture Index Score    Right Foot  Talar dome - 0  Talonavicular bulge - -1  Medial longitudinal arch - -1  Malleolar curve - -1   Calcaneal eversion - 0  Too many toes - 0  Total Score R = -3    Left Foot  Talar dome - -1  Talonavicular bulge - -1  Medial longitudinal arch - -1  Malleolar curve - -1   Calcaneal eversion - -1  Too many toes - 0  Total Score L = -5    Reference Values  Normal =    0 to +5  Pronated =  +6 to +9 Highly Pronated =  10+  Supinated =   -1 to -4 Highly Supinated = -5 to -12    Objective     Passive Range of Motion   Left Ankle/Foot  Normal passive range of motion    Right Ankle/Foot  Normal passive range of motion    Joint Play   Left Ankle/Foot  Hypomobile in the talocrural joint and forefoot.     Right Ankle/Foot  Hypomobile in the talocrural joint and forefoot.     Strength/Myotome Testing     Left Ankle/Foot   Dorsiflexion: 4  Plantar flexion: 4-  Inversion: 4 (pain)  Eversion: 4- (pain)    Right Ankle/Foot   Dorsiflexion: 4+  Plantar flexion: 4+  Inversion: 4+  Eversion: 4+    Ambulation     Comments   No pronation during weight acceptance, lateral weightbearing throughout gait.      Assessment:    Patient  requires custom foot orthosis with deepened heel cup, arch support, and lateral posting to correct altered gait mechanics. Patient is not currently controlled by her current shoe wear. Patient was educated on the design of the custom orthotic and it's ability to offload her current pathology. Patient was also educated on potential shoe wear changes with device, break-in period, and skin checks to avoid potential skin break down.     Orthotic goals:  1) Patient will have custom foot orthoses fitted to her shoe.   2) Patient will be compliant with break-in period of custom foot orthoses as prescribed by PT.   3) Patient will be compliant with custom foot orthoses use as prescribed by PT.     Plan:    Planned therapy interventions: orthotic fitting/training  Duration in visits: 2

## 2025-06-03 DIAGNOSIS — F33.2 MAJOR DEPRESSIVE DISORDER, RECURRENT SEVERE WITHOUT PSYCHOTIC FEATURES (HCC): ICD-10-CM

## 2025-06-04 ENCOUNTER — TELEPHONE (OUTPATIENT)
Dept: PSYCHIATRY | Facility: CLINIC | Age: 35
End: 2025-06-04

## 2025-06-04 ENCOUNTER — TELEMEDICINE (OUTPATIENT)
Dept: PSYCHIATRY | Facility: CLINIC | Age: 35
End: 2025-06-04
Payer: COMMERCIAL

## 2025-06-04 ENCOUNTER — TELEPHONE (OUTPATIENT)
Age: 35
End: 2025-06-04

## 2025-06-04 DIAGNOSIS — F41.1 GENERALIZED ANXIETY DISORDER: ICD-10-CM

## 2025-06-04 DIAGNOSIS — F33.2 MAJOR DEPRESSIVE DISORDER, RECURRENT SEVERE WITHOUT PSYCHOTIC FEATURES (HCC): Primary | ICD-10-CM

## 2025-06-04 PROCEDURE — 99214 OFFICE O/P EST MOD 30 MIN: CPT | Performed by: STUDENT IN AN ORGANIZED HEALTH CARE EDUCATION/TRAINING PROGRAM

## 2025-06-04 RX ORDER — MIRTAZAPINE 15 MG/1
15 TABLET, FILM COATED ORAL
Qty: 90 TABLET | Refills: 1 | OUTPATIENT
Start: 2025-06-04

## 2025-06-04 NOTE — TELEPHONE ENCOUNTER
PA for Brexpiprazole (REXULTI) 0.5 MG tablet  DENIED    Reason:(Screenshot if applicable)        Message sent to office clinical pool and provider Yes    Denial letter scanned into Media Yes    We can gladly do an appeal but the process can take about 30-60 days to provide determination. Please have the office staff schedule a Peer to Peer at phone [phone] . If an appeal is truly warranted please have Provider send clinical documentation to the PA department to support the appeal.     **Please follow up with your patient regarding denial and next steps**

## 2025-06-04 NOTE — TELEPHONE ENCOUNTER
PA for REXULT 0.5 MG tablet SUBMITTED to [facility]     via    []CMM-KEY:   [x]Surescripts-Case ID # [id number]   []Availity-Auth ID # NDC #   []Faxed to plan   []Other website   []Phone call Case ID #     []PA sent as URGENT    All office notes, labs and other pertaining documents and studies sent. Clinical questions answered. Awaiting determination from insurance company.     Turnaround time for your insurance to make a decision on your Prior Authorization can take 7-21 business days.

## 2025-06-04 NOTE — TELEPHONE ENCOUNTER
PA required for Brexpiprazole (REXULTI) 0.5 MG tablet     PA not submitted, waiting for office note dated 6/4 to be signed.

## 2025-06-04 NOTE — TELEPHONE ENCOUNTER
[facility] and needs any active labs sent there.  The fax# is [phone]

## 2025-06-04 NOTE — ASSESSMENT & PLAN NOTE
Symptoms remain persistent. She has had slight progress with initiation of Remeron, though remains depressed secondary to numerous stressors related to her daughter's health, her health, and difficulty with managing her daily responsibilities. Remeron was discontinued due to continued fatigue and appetite stimulation, which have improved since discontinuing. Will utilize Rexulti to assist with depression.    Continue Cymbalta 120 mg every evening.  Continue Wellbutrin 450 mg daily.  Start Rexulti 0.5 mg daily with plan to titrate to efficacy as indicated/tolerated.  Continue BuSpar 20 mg twice daily.  Continue Klonopin 0.75 mg twice daily.  Reviewed PDMP, patient has been filling this appropriately.    Patient has been managed additionally with Xanax 0.25 mg as needed for panic attacks.  She uses this roughly weekly.  Per PDMP, she does not require frequent fills of this.  We will continue at this time, though patient has been advised of goal to minimize benzodiazepine use and his symptoms improve.    Patient has attempted therapy in the past on several occasions and has not found benefit with this.  Patient currently not interested in pursuing psychotherapy.

## 2025-06-04 NOTE — PSYCH
MEDICATION MANAGEMENT NOTE    Name: Jia Armstrong      : 1990      MRN: [id number]  Encounter Provider: Hi Hopkins MD  Encounter Date: 2025   Encounter department: [facility]    Insurance: Payor: [employer] / Plan: [employer] / Product Type: [facility] /      Reason for Visit:   Chief Complaint   Patient presents with    Follow-up    Medication Management   :  Assessment & Plan  Major depressive disorder, recurrent severe without psychotic features (HCC)  Symptoms remain persistent. She has had slight progress with initiation of Remeron, though remains depressed secondary to numerous stressors related to her daughter's health, her health, and difficulty with managing her daily responsibilities. Remeron was discontinued due to continued fatigue and appetite stimulation, which have improved since discontinuing. Will utilize Rexulti to assist with depression.    Continue Cymbalta 120 mg every evening.  Continue Wellbutrin 450 mg daily.  Start Rexulti 0.5 mg daily with plan to titrate to efficacy as indicated/tolerated.  Continue BuSpar 20 mg twice daily.  Continue Klonopin 0.75 mg twice daily.  Reviewed PDMP, patient has been filling this appropriately.    Patient has been managed additionally with Xanax 0.25 mg as needed for panic attacks.  She uses this roughly weekly.  Per PDMP, she does not require frequent fills of this.  We will continue at this time, though patient has been advised of goal to minimize benzodiazepine use and his symptoms improve.    Patient has attempted therapy in the past on several occasions and has not found benefit with this.  Patient currently not interested in pursuing psychotherapy.    Orders:    Brexpiprazole (REXULTI) 0.5 MG tablet; Take 1 tablet (0.5 mg total) by mouth daily    Generalized anxiety disorder  Remeron was discontinued due to excess daytime sedation and appetite stimulation,  which have improved. She has some improvement in life stressors but continues to have ongoing stress related to her health. Will continue to monitor.    See plan for major depressive disorder.             Treatment Recommendations:    Educated about diagnosis and treatment modalities. Verbalizes understanding and agreement with the treatment plan.  Discussed self monitoring of symptoms, and symptom monitoring tools.  Discussed medications and if treatment adjustment was needed or desired.  Medication management every 4 weeks  Aware of 24 hour and weekend coverage for urgent situations accessed by calling [facility] main practice number  I am scheduling this patient out for greater than 3 months: No    Medications Risks/Benefits:      Risks, Benefits And Possible Side Effects Of Medications:    Risks, benefits, and possible side effects of medications explained to Jia and she (or legal representative) verbalizes understanding and agreement for treatment.    Controlled Medication Discussion:     Jia has been filling controlled prescriptions on time as prescribed according to [place] Prescription Drug Monitoring Program.      History of Present Illness     This patient presents for medication management follow up for MDD and SHERLY.  At last visit, Remeron was increased to 15 mg nightly and patient was continued on Cymbalta 120 mg daily and Wellbutrin  mg daily.  She was also continued on Klonopin 0.75 mg twice daily and patient takes Xanax 0.5 mg as needed for acute breakthrough anxiety, which she only takes roughly once weekly.  Since last visit, the patient reports that she has continued to struggle with ongoing health related stressors, particularly related to her SLE.  She feels no progress with management for this.  She continues to feel frequently incapacitated by her body and this has been quite frustrating for her.  The patient did continue Remeron due to feeling continued  daytime sedation and found that this improved significantly after she discontinued this.  She also was experiencing appetite stimulation.  The patient reports that she has not noticed any significant worsening of her mood or anxiety since discontinuing Remeron.  The patient reports that she has been continuing to feel often depressed and anxious.  She has been tried on numerous medications without benefit.  She has had slight improvement in stressors related to her daughter as the school year has ended, though her daughter does continue to have significant medical issues that require ongoing care.  The patient frequently experiences low mood, low interest, low energy, poor sleep.  She denies any SI or HI.  She denies any auditory or visual hallucinations.  His patient has been tried on numerous medications, we will attempt a trial with Rexulti to augment for depression.      Previous medication trials:  Zoloft  Lexapro  Effexor  Cymbalta  Pristiq  Wellbutrin  Trazodone  Remeron  Amitriptyline  Nortriptyline  Neurontin  Abilify  Buspar  Klonopin  Xanax     Review Of Systems: A review of systems is obtained and is negative except for the pertinent positives listed in HPI/Subjective above.      Current Rating Scores:     Current PHQ-9   PHQ-2/9 Depression Screening           Current SHERLY-7         Areas of Improvement: reviewed in HPI/Subjective Section and reviewed in Assessment and Plan Section      Past Medical History:   Diagnosis Date    Anxiety     C. difficile colitis 01/12/2023    Calculus of gallbladder with acute cholecystitis without obstruction 02/11/2022    Added automatically from request for surgery [id number]      Depression     Gestational hypertension 09/01/2018    Hypertension     Insomnia     Lupus     Migraines      Past Surgical History:   Procedure Laterality Date    APPENDECTOMY      around 2023    OCCIPITAL NEURECTOMY      2023, occipital nerve cut as migraine treatment    REDUCTION MAMMAPLASTY   "2015     Allergies:   Allergies   Allergen Reactions    Bee Venom Anaphylaxis and Hives    Amoxicillin Hives    Topamax [Topiramate] Delirium       Current Outpatient Medications   Medication Instructions    albuterol (PROVENTIL HFA,VENTOLIN HFA) 90 mcg/act inhaler INHALE 2 PUFFS EVERY 6 HOURS AS NEEDED FOR WHEEZING    ALPRAZolam (XANAX) 0.25 mg, Oral, As needed    Benlysta 200 mg    Brexpiprazole (REXULTI) 0.5 mg, Oral, Daily    buPROPion (WELLBUTRIN XL) 300 mg, Oral, Daily, Tahe 450 mg daily    buPROPion (WELLBUTRIN XL) 150 mg, Oral, Every morning, To be taken with 300 mg tab for total of 450 mg daily    busPIRone (BUSPAR) 20 mg, Oral, 2 times daily    clindamycin-benzoyl peroxide (BENZACLIN) gel Topical, 2 times daily    clonazePAM (KLONOPIN) 0.5 mg, Oral, 3 times daily    cyclobenzaprine (FLEXERIL) 5 mg tablet 1/2 to 2 tabs once daily if needed for migraine, may repeat dose X1 HS if necessary, max 3 days per week    Diclofenac Sodium (VOLTAREN) 2 g, Topical, 4 times daily PRN    DULoxetine (CYMBALTA) 120 mg, Oral, Daily, 120 mg taken evening    EPINEPHrine (EPIPEN) 0.3 mg, Intramuscular, Once    esomeprazole (NexIUM) 40 MG capsule TAKE 1 CAPSULE BY MOUTH 2 TIMES A DAY BEFORE MEALS.    gabapentin (NEURONTIN) 300 mg, 2 times daily    hydroxychloroquine (PLAQUENIL) 200 mg, 2 times daily with meals    ketorolac (TORADOL) 30 mg, Intramuscular, Daily PRN    lidocaine (Lidoderm) 5 % 1 patch, Topical, Daily, Remove & Discard patch within 12 hours or as directed by MD    lisinopril (ZESTRIL) 5 mg, Oral, Daily    Na Sulfate-K Sulfate-Mg Sulf (Suprep Bowel Prep Kit) 17.5-3.13-1.6 GM/177ML SOLN 360 mL, Oral, Once    Norethin-Eth Estrad-Fe Biphas 1 MG-10 MCG / 10 MCG TABS 1 tablet, Oral, Daily    ondansetron (ZOFRAN) 4 mg, Oral, Every 6 hours PRN    Qulipta 60 mg, Oral, Daily    saccharomyces boulardii (FLORASTOR) 250 mg, 2 times daily    Syringe/Needle, Disp, (SYRINGE 3CC/66VM7-7/4\") 27G X 1-1/4\" 3 ML MISC Use for Toradol " intramuscular injections.    Ubrelvy 100 MG tablet 1 tab as needed for migraine, may repeat X1 in 2 hours, max 2 uses in 24 hours    vitamin C 1,000 mg, Daily    Vitamin D 2,000 Units, Daily        Substance Abuse History:    Tobacco, Alcohol and Drug Use History     Tobacco Use    Smoking status: Never     Passive exposure: Never    Smokeless tobacco: Never   Vaping Use    Vaping status: Never Used   Substance Use Topics    Alcohol use: Not Currently    Drug use: Not Currently     Types: Marijuana          Social History:    Social History     Socioeconomic History    Marital status: /Civil Union     Spouse name: Not on file    Number of children: Not on file    Years of education: Not on file    Highest education level: Not on file   Occupational History    Not on file   Other Topics Concern    Not on file   Social History Narrative    Not on file        Family Psychiatric History:     Family History   Problem Relation Name Age of Onset    Breast cancer Mother      Colon cancer Father      Colon cancer Paternal Uncle         Medical History Reviewed by provider this encounter:          Objective   There were no vitals taken for this visit.     Mental Status Evaluation:    Appearance age appropriate, casually dressed   Behavior pleasant, cooperative, calm   Speech normal rate, normal volume, normal pitch, spontaneous   Mood depressed   Affect normal range and intensity, appropriate   Thought Processes organized, goal directed, linear   Thought Content no overt delusions   Perceptual Disturbances: no auditory hallucinations, no visual hallucinations   Abnormal Thoughts  Risk Potential Suicidal ideation - None  Homicidal ideation - None  Potential for aggression - No   Orientation oriented to person, place, time/date, and situation   Memory recent and remote memory grossly intact   Consciousness alert and awake   Attention Span Concentration Span attention span and concentration are age appropriate   Intellect  appears to be of average intelligence   Insight intact   Judgement intact   Muscle Strength and  Gait unable to assess today due to virtual visit   Motor activity no abnormal movements   Language no difficulty naming common objects, no difficulty repeating a phrase, no difficulty writing a sentence   Fund of Knowledge adequate knowledge of current events  adequate fund of knowledge regarding past history  adequate fund of knowledge regarding vocabulary        Laboratory Results: I have personally reviewed all pertinent laboratory/tests results    Recent Labs (last 6 months):   Office Visit on 03/11/2025   Component Date Value    POCT SARS-CoV-2 Ag 03/11/2025 Negative     VALID CONTROL 03/11/2025 Valid     RAPID FLU A 03/11/2025 neg     RAPID FLU B 03/11/2025 neg    Hospital Outpatient Visit on 02/07/2025   Component Date Value    EXT Preg Test, Ur 02/07/2025 Negative     Control 02/07/2025 Valid     Case Report 02/07/2025                      Value:Surgical Pathology Report                         Case: [id number]                                  Authorizing Provider:  Shashi Greenwood MD           Collected:           02/07/2025 0944              Ordering Location:     [facility] Received:            02/07/2025 1005                                     Endoscopy                                                                    Pathologist:           Manuela Pierce MD                                                                    Specimens:   A) - Duodenum                                                                                       B) - Stomach                                                                                        C) - Terminal Ileum                                                                                 D) - Colon, random                                                                         Final Diagnosis 02/07/2025                      Value:A. Duodenum, biopsy:  -    Duodenal mucosa with normal villous architecture and nonspecific patchy increase in intraepithelial lymphocytes (see comment).    B. Stomach, biopsy:  -   Gastric antral and transitional mucosa with reactive gastropathy and mild chronic inactive gastritis.   -   Gastric oxyntic mucosa with mild reactive changes.  -   Separate duodenal mucosa (tissue carryover) with denuded surface epithelium.   -   Negative for intestinal metaplasia and dysplasia.  -   Negative for Helicobacter pylori-type organisms on H&E stain.      C. Terminal ileum, biopsy:  -   Small intestinal mucosa with no significant histopathologic change.   -   No evidence of ileitis.       D. Colon, random, biopsy:  -   Colonic mucosa with no significant histopathologic change.  -   No evidence of colitis.       Comment:   A) The changes are not diagnostic for celiac disease.  Correlation with celiac antibody panel and/or genetic studies is recommended.  Other considerations include medication                           effect (especially olmesartan and related compounds), infection, and immune-mediated disorders.     Interpretation performed at [facility] [address] [place] [address] Way, [place] [address]       Additional Information 02/07/2025                      Value:All reported additional testing was performed with appropriately reactive controls.  These tests were developed and their performance characteristics determined by [facility] or appropriate performing facility, though some tests may be performed on tissues which have not been validated for performance characteristics (such as staining performed on alcohol exposed cell blocks and decalcified tissues).  Results should be interpreted with caution and in the context of the patients’ clinical condition. These tests may not be cleared or approved by the U.S. Food and Drug Administration, though the FDA has determined that such clearance or approval is not necessary. These  "tests are used for clinical purposes and they should not be regarded as investigational or for research. This laboratory has been approved by [facility], designated as a high-complexity laboratory and is qualified to perform these tests.  .      Gross Description 02/07/2025                      Value:A. The specimen is received in formalin, labeled with the patient's name and hospital number, and is designated \" duodenum\".  The specimen consists of multiple tan soft tissue fragments measuring in aggregate of 0.7 x 0.7 x 0.2 cm.  Entirely submitted. Screened cassette.  B. The specimen is received in formalin, labeled with the patient's name and hospital number, and is designated \" stomach\".  The specimen consists of multiple tan-brown soft tissue fragments measuring in aggregate of 1.2 x 0.7 x 0.2 cm.  Entirely submitted. Screened cassette.  C. The specimen is received in formalin, labeled with the patient's name and hospital number, and is designated \" terminal ileum\".  The specimen consists of 2 tan soft tissue fragments measuring 0.6 and 0.8 cm in greatest dimension.  Entirely submitted. Screened cassette.  D. The specimen is received in formalin, labeled with the patient's name and hospital number, and is designated \" random colon\".  The specimen consists of multiple tan soft                           tissue fragments measuring in aggregate of 1.4 x 0.9 x 0.2 cm.  Entirely submitted. Screened cassette.    Note: The estimated total formalin fixation time based upon information provided by the submitting clinician and the standard processing schedule is under 72 hours.    -[facility]      Clinical Information 02/07/2025                      Value:Per EGD,  INDICATION:  Diarrhea of presumed infectious origin, Weight loss, abnormal, Pain of upper abdomen, Gastroesophageal reflux disease, unspecified whether esophagitis present   FINDINGS:  · The esophagus appeared normal. Z-line is 40 cm from the incisors.  · Mild, patchy " edematous and erythematous mucosa in the body of the stomach  · The 1st part of the duodenum and 2nd part of the duodenum appeared normal.  · Performed forceps biopsies in the greater curve of the stomach, lesser curve of the stomach, incisura, antrum, 1st part of the duodenum and 2nd part of the duodenum to rule out celiac disease and H. pylori     Per colonoscopy,  INDICATION:  Diarrhea of presumed infectious origin, Family history of colon cancer, Weight loss, abnormal, Pain of upper abdomen  FINDINGS:  · The terminal ileum and entire colon appeared normal. Performed random biopsy using biopsy forceps.  · Fair preparation.  Most of colonic walls were visualized and appeared normal however this exam was not                           adequate for colon cancer screening.     Appointment on 01/15/2025   Component Date Value    MEJIA SYNDROME DNA ERIKA* 01/15/2025 Not Detected     HEREDITARY BREAST & OVAR* 01/15/2025 Not Detected     FAMILIAL HYPERCHOLESTERO* 01/15/2025 Not Detected        Suicide/Homicide Risk Assessment:    Risk of Harm to Self:  Based on today's assessment, [facility] presents the following risk of harm to self: low    Risk of Harm to Others:  Based on today's assessment, Jia presents the following risk of harm to others: low    The following interventions are recommended: Continue medication management. No other intervention changes indicated at this time.    Psychotherapy Provided:     Individual psychotherapy provided: Yes    Medication changes discussed with Jia.  Medication education provided to Jia.  Discussed with Jia recent stressors including daughter's medical problems, the patient's medical problems, difficulty with daily responsibilities, functional impairment and impact on independence.  Importance of medication and treatment compliance reviewed with Jia.  Reassurance and supportive therapy provided.     Treatment Plan:    Completed and signed during the session: Not applicable -  "Treatment Plan not due at this session.    Goals: Progress towards Treatment Plan goals - Yes, progressing, as evidenced by subjective findings in HPI/Subjective Section and in Assessment and Plan Section    Depression Follow-up Plan Completed: Yes    Note Share:    This note was not shared with the patient due to reasonable likelihood of causing patient harm    Administrative Statements   Administrative Statements   Encounter provider Hi Hopkins MD    The Patient is located at Home and in the following state in which I hold an active license [place].    The patient was identified by name and date of birth. Jia Armstrong was informed that this is a telemedicine visit and that the visit is being conducted through the Epic Embedded platform. She agrees to proceed..  My office door was closed. No one else was in the room.  She acknowledged consent and understanding of privacy and security of the video platform. The patient has agreed to participate and understands they can discontinue the visit at any time.    I have spent a total time of 30 minutes in caring for this patient on the day of the visit/encounter including Risks and benefits of tx options, Instructions for management, Patient and family education, Importance of tx compliance, Risk factor reductions, Impressions, Counseling / Coordination of care, Documenting in the medical record, and Reviewing/placing orders in the medical record (including tests, medications, and/or procedures), not including the time spent for establishing the audio/video connection.    Visit Time  Visit Start Time: 9:35 AM  Visit Stop Time: 10:05 AM  Total Visit Duration: 30 minutes    Portions of the record may have been created with voice recognition software. Occasional wrong word or \"sound a like\" substitutions may have occurred due to the inherent limitations of voice recognition software. Read the chart carefully and recognize, using context, where substitutions have " occurred.    Hi Hopkins MD 06/04/25   EOS calculated successfully. EOS Risk Factor: 0.5/1000 live births ([facility] national incidence); GA=38w5d; Temp=98.78; ROM=4.633; GBS='Positive'; Antibiotics='GBS specific antibiotics > 2 hrs prior to birth'

## 2025-06-04 NOTE — TELEPHONE ENCOUNTER
Please review denial and med trials needed per Jia's insurance. Thank you    Clinical will follow up as directed.

## 2025-06-04 NOTE — ASSESSMENT & PLAN NOTE
Remeron was discontinued due to excess daytime sedation and appetite stimulation, which have improved. She has some improvement in life stressors but continues to have ongoing stress related to her health. Will continue to monitor.    See plan for major depressive disorder.

## 2025-06-05 NOTE — TELEPHONE ENCOUNTER
Hi Dr. Hopkins,    The peer to peer is done by the provider or an Appeal can be submitted with additional information.  Per the letter, Jia needs a trial of 3 of the preferred medications listed. Looks like aripiprazole was tried. If additional med trials can be provided, that can be submitted or an explanation as to why she cannot try the others will be needed.     Thank you, Nola

## 2025-06-06 ENCOUNTER — TELEPHONE (OUTPATIENT)
Age: 35
End: 2025-06-06

## 2025-06-06 NOTE — TELEPHONE ENCOUNTER
Patient is calling to schedule a new patient appointment with Dr. Crowder. Patient was last seen by [facility] in [place], but has moved from [place] to [place] recently and states traveling to [place] is too difficult and would like to establish care with a provider closer in distance. Patient's records from [facility] were generated using care everywhere. Patient was scheduled to to see Dr. Crowder on 7/9 at 3:15 pm and placed on a wait list. I provided our office address as well as clear directions on how to locate our office. Patient verbalized understanding.      Thank you.

## 2025-06-09 ENCOUNTER — RESULTS FOLLOW-UP (OUTPATIENT)
Dept: FAMILY MEDICINE CLINIC | Facility: CLINIC | Age: 35
End: 2025-06-09

## 2025-06-11 ENCOUNTER — CLINICAL SUPPORT (OUTPATIENT)
Dept: FAMILY MEDICINE CLINIC | Facility: CLINIC | Age: 35
End: 2025-06-11
Payer: COMMERCIAL

## 2025-06-11 ENCOUNTER — RESULTS FOLLOW-UP (OUTPATIENT)
Dept: FAMILY MEDICINE CLINIC | Facility: CLINIC | Age: 35
End: 2025-06-11

## 2025-06-11 DIAGNOSIS — Z13.1 SCREENING FOR DIABETES MELLITUS: Primary | ICD-10-CM

## 2025-06-11 LAB
ARSENIC BLD-MCNC: <1 UG/L (ref 0–9)
B BURGDOR IGG+IGM SER QL IA: NEGATIVE
CHOLEST SERPL-MCNC: 189 MG/DL (ref 100–199)
HDLC SERPL-MCNC: 53 MG/DL
LDLC SERPL CALC-MCNC: 111 MG/DL (ref 0–99)
LEAD BLD-MCNC: <1 UG/DL (ref 0–3.4)
MERCURY BLD-MCNC: <1 UG/L (ref 0–14.9)
RETINOL BIND PROT SERPL-MCNC: 7.3 MG/DL (ref 1.6–6.1)
SL AMB POCT HEMOGLOBIN AIC: 5.2 (ref ?–6.5)
SL AMB VLDL CHOLESTEROL CALC: 25 MG/DL (ref 5–40)
TRIGL SERPL-MCNC: 143 MG/DL (ref 0–149)
VIT A SERPL-MCNC: 72.7 UG/DL (ref 18.9–57.3)

## 2025-06-11 PROCEDURE — 83036 HEMOGLOBIN GLYCOSYLATED A1C: CPT

## 2025-06-16 DIAGNOSIS — F41.1 GENERALIZED ANXIETY DISORDER: ICD-10-CM

## 2025-06-16 RX ORDER — CLONAZEPAM 0.5 MG/1
0.5 TABLET ORAL 3 TIMES DAILY
Qty: 90 TABLET | Refills: 1 | Status: CANCELLED | OUTPATIENT
Start: 2025-06-16

## 2025-06-16 RX ORDER — ALPRAZOLAM 0.5 MG
0.25 TABLET ORAL AS NEEDED
Qty: 5 TABLET | Refills: 1 | Status: CANCELLED | OUTPATIENT
Start: 2025-06-16

## 2025-06-16 NOTE — TELEPHONE ENCOUNTER
Medication Refill Request     Name of Medication     ALPRAZolam (XANAX) 0.5 mg tablet     Dose/Frequency Take 0.5 tablets (0.25 mg total) by mouth if needed for anxiety   Quantity 5 tabs  Verified pharmacy   [x]  Verified ordering Provider   [x]  Does patient have enough for the next 3 days? Yes [] No [x]  Does patient have a follow-up appointment scheduled? Yes [x] No []   If so when is appointment: 7/1/25    Medication Refill Request     Name of Medication     clonazePAM (KlonoPIN) 0.5 mg tablet     Dose/Frequency     Take 1 tablet (0.5 mg total) by mouth 3 (three) times a day     Quantity 90 tabs  Verified pharmacy   [x]  Verified ordering Provider   [x]  Does patient have enough for the next 3 days? Yes [] No [x]  Does patient have a follow-up appointment scheduled? Yes [x] No []   If so when is appointment: 7/1/25    **Patient advised that pharmacy is stating they need more information on why these scripts are needed before they will fill them

## 2025-06-16 NOTE — TELEPHONE ENCOUNTER
Refill must be reviewed and completed by the office or provider. The refill is unable to be approved or denied by the medication management team.     04/16/2025 02/19/2025 clonazePAM (Tablet) 90.0 30 0.5 MG NA [facility] Commercial [place] 1 / 1 [place]   1 [id number] ** 03/30/2025 02/25/2025 ALPRAZolam (Tablet) 5.0 10 0.5 MG NA [facility] [place] 1 / 1 [place]   1 [id number] ** 03/18/2025 09/23/2024 clonazePAM (Tablet) 90.0 30 0.5 MG NA [facility] Commercial Insurance 1 / 2 [place]

## 2025-06-25 ENCOUNTER — OFFICE VISIT (OUTPATIENT)
Dept: URGENT CARE | Facility: CLINIC | Age: 35
End: 2025-06-25
Payer: COMMERCIAL

## 2025-06-25 VITALS
OXYGEN SATURATION: 99 % | DIASTOLIC BLOOD PRESSURE: 77 MMHG | TEMPERATURE: 98 F | SYSTOLIC BLOOD PRESSURE: 118 MMHG | RESPIRATION RATE: 16 BRPM | HEART RATE: 86 BPM

## 2025-06-25 DIAGNOSIS — L25.5 DERMATITIS DUE TO PLANTS, INCLUDING POISON IVY, SUMAC, AND OAK: Primary | ICD-10-CM

## 2025-06-25 PROCEDURE — 96372 THER/PROPH/DIAG INJ SC/IM: CPT

## 2025-06-25 PROCEDURE — G0382 LEV 3 HOSP TYPE B ED VISIT: HCPCS

## 2025-06-25 RX ORDER — ESCITALOPRAM OXALATE 5 MG/1
25 TABLET ORAL DAILY
COMMUNITY

## 2025-06-25 RX ORDER — ATOGEPANT 60 MG/1
1 TABLET ORAL EVERY MORNING
COMMUNITY
Start: 2025-06-06

## 2025-06-25 RX ORDER — PREDNISONE 10 MG/1
TABLET ORAL
Qty: 35 TABLET | Refills: 0 | Status: SHIPPED | OUTPATIENT
Start: 2025-06-25 | End: 2025-07-10

## 2025-06-25 RX ORDER — ESCITALOPRAM OXALATE 20 MG/1
25 TABLET ORAL
COMMUNITY

## 2025-06-25 RX ORDER — DEXAMETHASONE SODIUM PHOSPHATE 10 MG/ML
10 INJECTION, SOLUTION INTRAMUSCULAR; INTRAVENOUS ONCE
Status: COMPLETED | OUTPATIENT
Start: 2025-06-25 | End: 2025-06-25

## 2025-06-25 RX ADMIN — DEXAMETHASONE SODIUM PHOSPHATE 10 MG: 10 INJECTION, SOLUTION INTRAMUSCULAR; INTRAVENOUS at 19:50

## 2025-06-25 NOTE — PROGRESS NOTES
[facility]        NAME: Jia Armstrong is a 35 y.o. female  : 1990    MRN: [id number]  DATE: 2025  TIME: 7:29 PM    Assessment and Plan   Dermatitis due to plants, including poison ivy, sumac, and oak [L25.5]  1. Dermatitis due to plants, including poison ivy, sumac, and oak  predniSONE 10 mg tablet    dexamethasone (PF) (DECADRON) injection 10 mg            Patient Instructions       Follow up with PCP in 3-5 days.  Proceed to  ER if symptoms worsen.    If tests are performed, our office will contact you with results only if changes need to made to the care plan discussed with you at the visit. [facility]    Chief Complaint     Chief Complaint   Patient presents with    Rash     Noted yesterday, itchy. Getting worse.          History of Present Illness       35 year old female with significant PMH presents to this clinic with complaint of a rash which started 1 day prior to arrival. She describes the rash as itchy and worsening since onset. Pt reports she was removing road signs for an organization and thinks she came in contact with some type of poison plant. She started with an itchy rash on the left side of her neck. She reports it was hot the day she removed the signs and she was wiping her face and now has the rash on her forehead, cheeks and on her chin.        Review of Systems   Review of Systems   Skin:  Positive for rash.         Current Medications     Current Medications[1]    Current Allergies     Allergies as of 2025 - Reviewed 2025   Allergen Reaction Noted    Bee venom Anaphylaxis and Hives 2024    Amoxicillin Hives 2024    Topamax [topiramate] Delirium 2024            The following portions of the patient's history were reviewed and updated as appropriate: allergies, current medications, past family history, past medical history, past social history, past surgical history and problem list.     Past  Medical History[2]    Past Surgical History[3]    Family History[4]      Medications have been verified.        Objective   /77   Pulse 86   Temp 98 °F (36.7 °C)   Resp 16   SpO2 99%        Physical Exam     Physical Exam  Vitals and nursing note reviewed.   Constitutional:       General: She is not in acute distress.     Appearance: Normal appearance. She is obese. She is not ill-appearing.   HENT:      Head: Normocephalic and atraumatic.     Cardiovascular:      Rate and Rhythm: Normal rate and regular rhythm.      Pulses: Normal pulses.      Heart sounds: Normal heart sounds.   Pulmonary:      Effort: Pulmonary effort is normal.      Breath sounds: Normal breath sounds.     Musculoskeletal:         General: Normal range of motion.      Cervical back: Normal range of motion and neck supple.     Skin:     General: Skin is warm and dry.      Capillary Refill: Capillary refill takes less than 2 seconds.      Findings: Rash present. Rash is macular and papular.           Comments: Maculopapular rash on left neck, forehead, and chin area; appears poison like without drainage or bleeding from areas; few vesicles developing on the left neck and forehead areas affected      Neurological:      General: No focal deficit present.      Mental Status: She is alert and oriented to person, place, and time. Mental status is at baseline.                        [1]   Current Outpatient Medications:     ALPRAZolam (XANAX) 0.5 mg tablet, Take 0.5 tablets (0.25 mg total) by mouth if needed for anxiety, Disp: 5 tablet, Rfl: 1    Ascorbic Acid (vitamin C) 1000 MG tablet, Take 1,000 mg by mouth in the morning., Disp: , Rfl:     Benlysta 200 MG/ML SOAJ, 200 mg Once a week, Disp: , Rfl:     buPROPion (WELLBUTRIN XL) 150 mg 24 hr tablet, Take 1 tablet (150 mg total) by mouth every morning To be taken with 300 mg tab for total of 450 mg daily, Disp: 90 tablet, Rfl: 0    buPROPion (WELLBUTRIN XL) 300 mg 24 hr tablet, Take 1 tablet  (300 mg total) by mouth daily Tahe 450 mg daily, Disp: 90 tablet, Rfl: 0    busPIRone (BUSPAR) 10 mg tablet, Take 2 tablets (20 mg total) by mouth 2 (two) times a day, Disp: 360 tablet, Rfl: 0    Cholecalciferol (Vitamin D) 50 MCG (2000 UT) tablet, Take 2,000 Units by mouth in the morning., Disp: , Rfl:     clonazePAM (KlonoPIN) 0.5 mg tablet, Take 1 tablet (0.5 mg total) by mouth 3 (three) times a day, Disp: 90 tablet, Rfl: 1    cyclobenzaprine (FLEXERIL) 5 mg tablet, 1/2 to 2 tabs once daily if needed for migraine, may repeat dose X1 HS if necessary, max 3 days per week, Disp: 20 tablet, Rfl: 1    DULoxetine (CYMBALTA) 60 mg delayed release capsule, Take 2 capsules (120 mg total) by mouth in the morning 120 mg taken evening, Disp: 180 capsule, Rfl: 0    esomeprazole (NexIUM) 40 MG capsule, TAKE 1 CAPSULE BY MOUTH 2 TIMES A DAY BEFORE MEALS., Disp: 180 capsule, Rfl: 1    gabapentin (NEURONTIN) 300 mg capsule, Take 300 mg by mouth in the morning and 300 mg in the evening. Take 2 caps BID., Disp: , Rfl:     hydroxychloroquine (PLAQUENIL) 200 mg tablet, Take 200 mg by mouth in the morning and 200 mg in the evening. Take with meals., Disp: , Rfl:     ketorolac (TORADOL) 30 mg/mL injection, Inject 1 mL (30 mg total) into a muscle daily as needed (Migraine, max 2 days per week), Disp: 10 mL, Rfl: 1    lisinopril (ZESTRIL) 5 mg tablet, TAKE 1 TABLET (5 MG TOTAL) BY MOUTH DAILY., Disp: 90 tablet, Rfl: 0    Norethin-Eth Estrad-Fe Biphas 1 MG-10 MCG / 10 MCG TABS, Take 1 tablet by mouth in the morning, Disp: 90 tablet, Rfl: 4    ondansetron (ZOFRAN) 4 mg tablet, Take 1 tablet (4 mg total) by mouth every 6 (six) hours as needed for nausea or vomiting, Disp: 30 tablet, Rfl: 4    predniSONE 10 mg tablet, Take 4 tablets (40 mg total) by mouth daily for 5 days, THEN 2 tablets (20 mg total) daily for 5 days, THEN 1 tablet (10 mg total) daily for 5 days., Disp: 35 tablet, Rfl: 0    Qulipta 60 MG TABS, Take 1 tablet by mouth every  "morning, Disp: , Rfl:     saccharomyces boulardii (FLORASTOR) 250 mg capsule, Take 250 mg by mouth in the morning and 250 mg in the evening., Disp: , Rfl:     Ubrelvy 100 MG tablet, 1 tab as needed for migraine, may repeat X1 in 2 hours, max 2 uses in 24 hours, Disp: 9 tablet, Rfl: 5    albuterol (PROVENTIL HFA,VENTOLIN HFA) 90 mcg/act inhaler, INHALE 2 PUFFS EVERY 6 HOURS AS NEEDED FOR WHEEZING (Patient not taking: Reported on 5/8/2025), Disp: 6.7 g, Rfl: 0    Brexpiprazole (REXULTI) 0.5 MG tablet, Take 1 tablet (0.5 mg total) by mouth daily (Patient not taking: Reported on 6/25/2025), Disp: 30 tablet, Rfl: 1    clindamycin-benzoyl peroxide (BENZACLIN) gel, Apply topically 2 (two) times a day, Disp: 25 g, Rfl: 0    Diclofenac Sodium (VOLTAREN) 1 %, Apply 2 g topically 4 (four) times a day as needed (migarine or neck pain), Disp: 100 g, Rfl: 1    EPINEPHrine (EPIPEN) 0.3 mg/0.3 mL SOAJ, Inject 0.3 mL (0.3 mg total) into a muscle once for 1 dose, Disp: 0.6 mL, Rfl: 0    escitalopram (LEXAPRO) 20 mg tablet, 25 mg, Disp: , Rfl:     escitalopram (LEXAPRO) 5 mg tablet, Take 25 mg by mouth daily, Disp: , Rfl:     lidocaine (Lidoderm) 5 %, Apply 1 patch topically over 12 hours daily Remove & Discard patch within 12 hours or as directed by MD (Patient not taking: Reported on 5/14/2025), Disp: 30 patch, Rfl: 0    Syringe/Needle, Disp, (SYRINGE 3CC/75DI8-3/4\") 27G X 1-1/4\" 3 ML MISC, Use for Toradol intramuscular injections., Disp: 10 each, Rfl: 0  No current facility-administered medications for this visit.  [2]   Past Medical History:  Diagnosis Date    Anxiety     C. difficile colitis 01/12/2023    Calculus of gallbladder with acute cholecystitis without obstruction 02/11/2022    Added automatically from request for surgery [id number]      Depression     Gestational hypertension 09/01/2018    Hypertension     Insomnia     Lupus     Migraines    [3]   Past Surgical History:  Procedure Laterality Date    APPENDECTOMY      " around 2023    OCCIPITAL NEURECTOMY      2023, occipital nerve cut as migraine treatment    REDUCTION MAMMAPLASTY  2015   [4]   Family History  Problem Relation Name Age of Onset    Breast cancer Mother      Colon cancer Father      Colon cancer Paternal Uncle

## 2025-06-25 NOTE — PATIENT INSTRUCTIONS
Take prednisone as prescribed for the full and complete course    Scrub areas exposed to poison plants with washcloth and Paayl dish soap after initial contact    Avoid scratching area    Cool compresses    Allegra and Pepcid over the counter and take as directed (once daily for each medication)    Keep area clean and dry    Watch for signs of infection (increased redness, swelling, thick pus like discharge, warmth or pain)    You may use over the counter benadryl topically or calamine lotion to control the itching associated with the rash.     Avoid taking hot showers or baths as this aggravates the rash and intensifies the itching. Lukewarm water is sufficient.    If there are any weeping areas, be sure to wash your clothing and linens in hot water if they become soiled. You may need to cover weeping areas to avoid contamination and infection.     If you develop any issues with your breathing, you're unable to swallow your saliva or your develop problems with your vision you need to go to the emergency room to be evaluated.     Follow up with PCP in 3-5 days.    Proceed to  ER if symptoms worsen.

## 2025-06-27 ENCOUNTER — HOSPITAL ENCOUNTER (OUTPATIENT)
Dept: MRI IMAGING | Facility: HOSPITAL | Age: 35
End: 2025-06-27
Attending: PSYCHIATRY & NEUROLOGY
Payer: COMMERCIAL

## 2025-06-27 ENCOUNTER — APPOINTMENT (OUTPATIENT)
Dept: PHYSICAL THERAPY | Facility: CLINIC | Age: 35
End: 2025-06-27
Attending: FAMILY MEDICINE
Payer: COMMERCIAL

## 2025-06-27 DIAGNOSIS — G43.809 OTHER MIGRAINE WITHOUT STATUS MIGRAINOSUS, NOT INTRACTABLE: ICD-10-CM

## 2025-06-27 DIAGNOSIS — G43.E01 CHRONIC MIGRAINE WITH AURA AND WITH STATUS MIGRAINOSUS, NOT INTRACTABLE: ICD-10-CM

## 2025-06-27 DIAGNOSIS — M54.2 NECK PAIN: ICD-10-CM

## 2025-06-27 DIAGNOSIS — E67.0 HYPERVITAMINOSIS A: ICD-10-CM

## 2025-06-27 PROCEDURE — A9585 GADOBUTROL INJECTION: HCPCS | Performed by: PSYCHIATRY & NEUROLOGY

## 2025-06-27 PROCEDURE — 70551 MRI BRAIN STEM W/O DYE: CPT

## 2025-06-27 PROCEDURE — 72156 MRI NECK SPINE W/O & W/DYE: CPT

## 2025-06-27 RX ORDER — CYCLOBENZAPRINE HCL 5 MG
TABLET ORAL
Qty: 20 TABLET | Refills: 1 | Status: SHIPPED | OUTPATIENT
Start: 2025-06-27

## 2025-06-28 ENCOUNTER — HOSPITAL ENCOUNTER (OUTPATIENT)
Dept: RADIOLOGY | Facility: HOSPITAL | Age: 35
Discharge: HOME | End: 2025-06-28

## 2025-06-28 DIAGNOSIS — M54.2 NECK PAIN: ICD-10-CM

## 2025-06-28 DIAGNOSIS — G43.E01 CHRONIC MIGRAINE WITH AURA AND WITH STATUS MIGRAINOSUS, NOT INTRACTABLE: ICD-10-CM

## 2025-06-28 PROCEDURE — A9585 GADOBUTROL INJECTION: HCPCS | Performed by: PSYCHIATRY & NEUROLOGY

## 2025-06-28 RX ORDER — GADOBUTROL 604.72 MG/ML
10 INJECTION INTRAVENOUS
Status: COMPLETED | OUTPATIENT
Start: 2025-06-28 | End: 2025-06-28

## 2025-06-28 RX ADMIN — GADOBUTROL 10 ML: 604.72 INJECTION INTRAVENOUS at 00:11

## 2025-07-01 ENCOUNTER — OFFICE VISIT (OUTPATIENT)
Dept: PHYSICAL THERAPY | Facility: CLINIC | Age: 35
End: 2025-07-01
Attending: FAMILY MEDICINE
Payer: COMMERCIAL

## 2025-07-01 ENCOUNTER — TELEMEDICINE (OUTPATIENT)
Dept: PSYCHIATRY | Facility: CLINIC | Age: 35
End: 2025-07-01
Payer: COMMERCIAL

## 2025-07-01 DIAGNOSIS — F41.1 GENERALIZED ANXIETY DISORDER: ICD-10-CM

## 2025-07-01 DIAGNOSIS — M76.822 POSTERIOR TIBIAL TENDON DYSFUNCTION (PTTD) OF BOTH LOWER EXTREMITIES: Primary | ICD-10-CM

## 2025-07-01 DIAGNOSIS — M76.821 POSTERIOR TIBIAL TENDON DYSFUNCTION (PTTD) OF BOTH LOWER EXTREMITIES: Primary | ICD-10-CM

## 2025-07-01 DIAGNOSIS — F33.2 MAJOR DEPRESSIVE DISORDER, RECURRENT SEVERE WITHOUT PSYCHOTIC FEATURES (HCC): Primary | ICD-10-CM

## 2025-07-01 PROCEDURE — L3010 FOOT LONGITUDINAL ARCH SUPPO: HCPCS

## 2025-07-01 PROCEDURE — 99214 OFFICE O/P EST MOD 30 MIN: CPT | Performed by: STUDENT IN AN ORGANIZED HEALTH CARE EDUCATION/TRAINING PROGRAM

## 2025-07-01 RX ORDER — ALPRAZOLAM 0.5 MG
0.25 TABLET ORAL AS NEEDED
Qty: 5 TABLET | Refills: 1 | Status: SHIPPED | OUTPATIENT
Start: 2025-07-01

## 2025-07-01 RX ORDER — DOXEPIN HYDROCHLORIDE 10 MG/1
10 CAPSULE ORAL
Qty: 30 CAPSULE | Refills: 1 | Status: SHIPPED | OUTPATIENT
Start: 2025-07-01

## 2025-07-01 NOTE — ASSESSMENT & PLAN NOTE
Symptoms remain persistent. She has had slight progress with initiation of Remeron, though remains depressed secondary to numerous stressors related to her daughter's health, her health, and difficulty with managing her daily responsibilities. Remeron was discontinued due to continued fatigue and appetite stimulation, which have improved since discontinuing. Will utilize Vraylar to assist with persistent depressive symptoms.    Continue Cymbalta 120 mg every evening.  Continue Wellbutrin 450 mg daily.  Start Vraylar 1.5 mg daily.  Discontinue Rexulti due to insurance coverage issues.  Can consider at a later date if patient is unable to tolerate Vraylar or has no benefit.  Start doxepin 10 mg nightly as needed for insomnia.  Continue BuSpar 20 mg twice daily.  Continue Klonopin 0.75 mg twice daily.  Reviewed PDMP, patient has been filling this appropriately.    Patient has been managed additionally with Xanax 0.25 mg as needed for panic attacks.  She uses this roughly weekly.  Per PDMP, she does not require frequent fills of this.  We will continue at this time, though patient has been advised of goal to minimize benzodiazepine use and his symptoms improve.    Patient has attempted therapy in the past on several occasions and has not found benefit with this.  Patient currently not interested in pursuing psychotherapy.

## 2025-07-01 NOTE — ASSESSMENT & PLAN NOTE
Remeron was discontinued due to excess daytime sedation and appetite stimulation, which have improved. She has some improvement in life stressors but continues to have ongoing stress related to her health and life responsibilities. Will continue to monitor.    See plan for major depressive disorder.

## 2025-07-01 NOTE — PROGRESS NOTES
Custom orthotics fitted to patients shoe and dispensed. Patient educated on appropriate break in period. Patient will follow up if any future problems arise.     Evangelista Canas, PT  7/1/2025  4:02 PM

## 2025-07-01 NOTE — PSYCH
MEDICATION MANAGEMENT NOTE    Name: Jia Armstrong      : 1990      MRN: [id number]  Encounter Provider: Hi Hopkins MD  Encounter Date: 2025   Encounter department: [facility]    Insurance: Payor: [employer] / Plan: [employer] / Product Type: [facility] /      Reason for Visit:   Chief Complaint   Patient presents with    Follow-up    Medication Management   :  Assessment & Plan  Major depressive disorder, recurrent severe without psychotic features (HCC)  Symptoms remain persistent. She has had slight progress with initiation of Remeron, though remains depressed secondary to numerous stressors related to her daughter's health, her health, and difficulty with managing her daily responsibilities. Remeron was discontinued due to continued fatigue and appetite stimulation, which have improved since discontinuing. Will utilize Vraylar to assist with persistent depressive symptoms.    Continue Cymbalta 120 mg every evening.  Continue Wellbutrin 450 mg daily.  Start Vraylar 1.5 mg daily.  Discontinue Rexulti due to insurance coverage issues.  Can consider at a later date if patient is unable to tolerate Vraylar or has no benefit.  Start doxepin 10 mg nightly as needed for insomnia.  Continue BuSpar 20 mg twice daily.  Continue Klonopin 0.75 mg twice daily.  Reviewed PDMP, patient has been filling this appropriately.    Patient has been managed additionally with Xanax 0.25 mg as needed for panic attacks.  She uses this roughly weekly.  Per PDMP, she does not require frequent fills of this.  We will continue at this time, though patient has been advised of goal to minimize benzodiazepine use and his symptoms improve.    Patient has attempted therapy in the past on several occasions and has not found benefit with this.  Patient currently not interested in pursuing psychotherapy.    Orders:    doxepin (SINEquan) 10 mg capsule; Take  1 capsule (10 mg total) by mouth daily at bedtime as needed for sleep    cariprazine (Vraylar) 1.5 MG capsule; Take 1 capsule (1.5 mg total) by mouth daily    Generalized anxiety disorder  Remeron was discontinued due to excess daytime sedation and appetite stimulation, which have improved. She has some improvement in life stressors but continues to have ongoing stress related to her health and life responsibilities. Will continue to monitor.    See plan for major depressive disorder.    Orders:    ALPRAZolam (XANAX) 0.5 mg tablet; Take 0.5 tablets (0.25 mg total) by mouth if needed for anxiety    doxepin (SINEquan) 10 mg capsule; Take 1 capsule (10 mg total) by mouth daily at bedtime as needed for sleep        Treatment Recommendations:    Educated about diagnosis and treatment modalities. Verbalizes understanding and agreement with the treatment plan.  Discussed self monitoring of symptoms, and symptom monitoring tools.  Discussed medications and if treatment adjustment was needed or desired.  Medication management every 4 weeks  Aware of 24 hour and weekend coverage for urgent situations accessed by calling [facility] main practice number  I am scheduling this patient out for greater than 3 months: No    Medications Risks/Benefits:      Risks, Benefits And Possible Side Effects Of Medications:    Risks, benefits, and possible side effects of medications explained to Jia and she (or legal representative) verbalizes understanding and agreement for treatment.    Controlled Medication Discussion:     Jia has been filling controlled prescriptions on time as prescribed according to [place] Prescription Drug Monitoring Program.      History of Present Illness     This patient presents for medication management follow up for MDD and SHERLY.  At last visit, patient was continued on Cymbalta 120 mg daily and Wellbutrin  mg daily.  She was also continued on Klonopin 0.75 mg twice daily and  patient takes Xanax 0.5 mg as needed for acute breakthrough anxiety, which she only takes roughly once weekly. Remeron had been discontinued as well. Rexulti was started but could not be filled due to insurance coverage issues. She has been struggling more with sleep. She continues to struggle with depressed mood.  Life has been very busy and stressful recently.  Anxiety has remained quite high.  She will be going away in July for a family trip and is looking forward to this.  Upon reviewing patient's medication history, patient does note that she had been on Seroquel previously.  Patient prefers to minimize risk of weight gain.  She is amenable to trial of Vraylar.  Discussed using small dose of doxepin at 10 mg nightly as needed to assist with sleep.  Patient expressed understanding and was agreeable to this.  She denies any SI, HI, or AVH.  She denies any other questions or concerns at this time.        Previous medication trials:  Zoloft  Lexapro  Effexor  Cymbalta  Pristiq  Wellbutrin  Trazodone  Remeron  Amitriptyline  Nortriptyline  Neurontin  Seroquel  Abilify  Buspar  Klonopin  Xanax     Review Of Systems: A review of systems is obtained and is negative except for the pertinent positives listed in HPI/Subjective above.      Current Rating Scores:     Current PHQ-9   PHQ-2/9 Depression Screening           Current SHERLY-7         Areas of Improvement: reviewed in HPI/Subjective Section and reviewed in Assessment and Plan Section      Past Medical History:   Diagnosis Date    Anxiety     C. difficile colitis 01/12/2023    Calculus of gallbladder with acute cholecystitis without obstruction 02/11/2022    Added automatically from request for surgery [id number]      Depression     Gestational hypertension 09/01/2018    Hypertension     Insomnia     Lupus     Migraines      Past Surgical History:   Procedure Laterality Date    APPENDECTOMY      around 2023    OCCIPITAL NEURECTOMY      2023, occipital nerve cut as  migraine treatment    REDUCTION MAMMAPLASTY  2015     Allergies:   Allergies   Allergen Reactions    Bee Venom Anaphylaxis and Hives    Amoxicillin Hives    Topamax [Topiramate] Delirium       Current Outpatient Medications   Medication Instructions    albuterol (PROVENTIL HFA,VENTOLIN HFA) 90 mcg/act inhaler INHALE 2 PUFFS EVERY 6 HOURS AS NEEDED FOR WHEEZING    ALPRAZolam (XANAX) 0.25 mg, Oral, As needed    Benlysta 200 mg    buPROPion (WELLBUTRIN XL) 300 mg, Oral, Daily, Tahe 450 mg daily    buPROPion (WELLBUTRIN XL) 150 mg, Oral, Every morning, To be taken with 300 mg tab for total of 450 mg daily    busPIRone (BUSPAR) 20 mg, Oral, 2 times daily    cariprazine (VRAYLAR) 1.5 mg, Oral, Daily    clindamycin-benzoyl peroxide (BENZACLIN) gel Topical, 2 times daily    clonazePAM (KLONOPIN) 0.5 mg, Oral, 3 times daily    cyclobenzaprine (FLEXERIL) 5 mg tablet 1/2 to 2 tabs once daily if needed for migraine, may repeat dose X1 HS if necessary, max 3 days per week    Diclofenac Sodium (VOLTAREN) 2 g, Topical, 4 times daily PRN    doxepin (SINEQUAN) 10 mg, Oral, Daily at bedtime PRN    DULoxetine (CYMBALTA) 120 mg, Oral, Daily, 120 mg taken evening    EPINEPHrine (EPIPEN) 0.3 mg, Intramuscular, Once    escitalopram (LEXAPRO) 25 mg    escitalopram (LEXAPRO) 25 mg, Oral, Daily    esomeprazole (NexIUM) 40 MG capsule TAKE 1 CAPSULE BY MOUTH 2 TIMES A DAY BEFORE MEALS.    gabapentin (NEURONTIN) 300 mg, 2 times daily    hydroxychloroquine (PLAQUENIL) 200 mg, 2 times daily with meals    ketorolac (TORADOL) 30 mg, Intramuscular, Daily PRN    lidocaine (Lidoderm) 5 % 1 patch, Topical, Daily, Remove & Discard patch within 12 hours or as directed by MD    lisinopril (ZESTRIL) 5 mg, Oral, Daily    Norethin-Eth Estrad-Fe Biphas 1 MG-10 MCG / 10 MCG TABS 1 tablet, Oral, Daily    ondansetron (ZOFRAN) 4 mg, Oral, Every 6 hours PRN    predniSONE 10 mg tablet Take 4 tablets (40 mg total) by mouth daily for 5 days, THEN 2 tablets (20 mg  "total) daily for 5 days, THEN 1 tablet (10 mg total) daily for 5 days.    Qulipta 60 MG TABS 1 tablet, Every morning    saccharomyces boulardii (FLORASTOR) 250 mg, 2 times daily    Syringe/Needle, Disp, (SYRINGE 3CC/31WP8-2/4\") 27G X 1-1/4\" 3 ML MISC Use for Toradol intramuscular injections.    Ubrelvy 100 MG tablet 1 tab as needed for migraine, may repeat X1 in 2 hours, max 2 uses in 24 hours    vitamin C 1,000 mg, Daily    Vitamin D 2,000 Units, Daily        Substance Abuse History:    Tobacco, Alcohol and Drug Use History     Tobacco Use    Smoking status: Never     Passive exposure: Never    Smokeless tobacco: Never   Vaping Use    Vaping status: Never Used   Substance Use Topics    Alcohol use: Not Currently    Drug use: Not Currently     Types: Marijuana          Social History:    Social History     Socioeconomic History    Marital status: /Civil Union     Spouse name: Not on file    Number of children: Not on file    Years of education: Not on file    Highest education level: Not on file   Occupational History    Not on file   Other Topics Concern    Not on file   Social History Narrative    Not on file        Family Psychiatric History:     Family History   Problem Relation Name Age of Onset    Breast cancer Mother      Colon cancer Father      Colon cancer Paternal Uncle         Medical History Reviewed by provider this encounter:          Objective   There were no vitals taken for this visit.     Mental Status Evaluation:    Appearance age appropriate, casually dressed   Behavior pleasant, cooperative, calm   Speech normal rate, normal volume, normal pitch, spontaneous   Mood \"Okay\"   Affect normal range and intensity, appropriate   Thought Processes organized, goal directed, linear   Thought Content no overt delusions   Perceptual Disturbances: no auditory hallucinations, no visual hallucinations   Abnormal Thoughts  Risk Potential Suicidal ideation - None  Homicidal ideation - None  Potential for " aggression - No   Orientation oriented to person, place, time/date, and situation   Memory recent and remote memory grossly intact   Consciousness alert and awake   Attention Span Concentration Span attention span and concentration are age appropriate   Intellect appears to be of average intelligence   Insight intact   Judgement intact   Muscle Strength and  Gait unable to assess today due to virtual visit   Motor activity no abnormal movements   Language no difficulty naming common objects, no difficulty repeating a phrase, no difficulty writing a sentence   Fund of Knowledge adequate knowledge of current events  adequate fund of knowledge regarding past history  adequate fund of knowledge regarding vocabulary        Laboratory Results: I have personally reviewed all pertinent laboratory/tests results    Recent Labs (last 6 months):   Clinical Support on 06/11/2025   Component Date Value    Hemoglobin A1C 06/11/2025 5.2    Orders Only on 06/04/2025   Component Date Value    Cholesterol, Total 06/04/2025 189     Triglycerides 06/04/2025 143     HDL 06/04/2025 53     VLDL Cholesterol Calcula* 06/04/2025 25     LDL Calculated 06/04/2025 111 (H)     Lead Blood 06/04/2025 <1.0     Arsenic 06/04/2025 <1     Mercury 06/04/2025 <1.0     Vitamin A 06/04/2025 72.7 (H)     RBP 06/04/2025 7.3 (H)     LYME TOTAL ANTIBODY EIA 06/04/2025 Negative    Office Visit on 03/11/2025   Component Date Value    POCT SARS-CoV-2 Ag 03/11/2025 Negative     VALID CONTROL 03/11/2025 Valid     RAPID FLU A 03/11/2025 neg     RAPID FLU B 03/11/2025 neg    Hospital Outpatient Visit on 02/07/2025   Component Date Value    EXT Preg Test, Ur 02/07/2025 Negative     Control 02/07/2025 Valid     Case Report 02/07/2025                      Value:Surgical Pathology Report                         Case: [id number]                                  Authorizing Provider:  Shashi Greenwood MD           Collected:           02/07/2025 0944              Ordering  Location:     Critical access hospital Received:            02/07/2025 1005                                     Endoscopy                                                                    Pathologist:           Manuela Pierce MD                                                                    Specimens:   A) - Duodenum                                                                                       B) - Stomach                                                                                        C) - Terminal Ileum                                                                                 D) - Colon, random                                                                         Final Diagnosis 02/07/2025                      Value:A. Duodenum, biopsy:  -   Duodenal mucosa with normal villous architecture and nonspecific patchy increase in intraepithelial lymphocytes (see comment).    B. Stomach, biopsy:  -   Gastric antral and transitional mucosa with reactive gastropathy and mild chronic inactive gastritis.   -   Gastric oxyntic mucosa with mild reactive changes.  -   Separate duodenal mucosa (tissue carryover) with denuded surface epithelium.   -   Negative for intestinal metaplasia and dysplasia.  -   Negative for Helicobacter pylori-type organisms on H&E stain.      C. Terminal ileum, biopsy:  -   Small intestinal mucosa with no significant histopathologic change.   -   No evidence of ileitis.       D. Colon, random, biopsy:  -   Colonic mucosa with no significant histopathologic change.  -   No evidence of colitis.       Comment:   A) The changes are not diagnostic for celiac disease.  Correlation with celiac antibody panel and/or genetic studies is recommended.  Other considerations include medication                           effect (especially olmesartan and related compounds), infection, and immune-mediated disorders.     Interpretation performed at [facility] [address] [place], [place] [address], [place]  "[place] [id number]       Additional Information 02/07/2025                      Value:All reported additional testing was performed with appropriately reactive controls.  These tests were developed and their performance characteristics determined by [facility] or appropriate performing facility, though some tests may be performed on tissues which have not been validated for performance characteristics (such as staining performed on alcohol exposed cell blocks and decalcified tissues).  Results should be interpreted with caution and in the context of the patients’ clinical condition. These tests may not be cleared or approved by the U.S. Food and Drug Administration, though the FDA has determined that such clearance or approval is not necessary. These tests are used for clinical purposes and they should not be regarded as investigational or for research. This laboratory has been approved by [facility], designated as a high-complexity laboratory and is qualified to perform these tests.  .      Gross Description 02/07/2025                      Value:A. The specimen is received in formalin, labeled with the patient's name and hospital number, and is designated \" duodenum\".  The specimen consists of multiple tan soft tissue fragments measuring in aggregate of 0.7 x 0.7 x 0.2 cm.  Entirely submitted. Screened cassette.  B. The specimen is received in formalin, labeled with the patient's name and hospital number, and is designated \" stomach\".  The specimen consists of multiple tan-brown soft tissue fragments measuring in aggregate of 1.2 x 0.7 x 0.2 cm.  Entirely submitted. Screened cassette.  C. The specimen is received in formalin, labeled with the patient's name and hospital number, and is designated \" terminal ileum\".  The specimen consists of 2 tan soft tissue fragments measuring 0.6 and 0.8 cm in greatest dimension.  Entirely submitted. Screened cassette.  D. The specimen is received in formalin, labeled with " "the patient's name and hospital number, and is designated \" random colon\".  The specimen consists of multiple tan soft                           tissue fragments measuring in aggregate of 1.4 x 0.9 x 0.2 cm.  Entirely submitted. Screened cassette.    Note: The estimated total formalin fixation time based upon information provided by the submitting clinician and the standard processing schedule is under 72 hours.    -[facility]      Clinical Information 02/07/2025                      Value:Per EGD,  INDICATION:  Diarrhea of presumed infectious origin, Weight loss, abnormal, Pain of upper abdomen, Gastroesophageal reflux disease, unspecified whether esophagitis present   FINDINGS:  · The esophagus appeared normal. Z-line is 40 cm from the incisors.  · Mild, patchy edematous and erythematous mucosa in the body of the stomach  · The 1st part of the duodenum and 2nd part of the duodenum appeared normal.  · Performed forceps biopsies in the greater curve of the stomach, lesser curve of the stomach, incisura, antrum, 1st part of the duodenum and 2nd part of the duodenum to rule out celiac disease and H. pylori     Per colonoscopy,  INDICATION:  Diarrhea of presumed infectious origin, Family history of colon cancer, Weight loss, abnormal, Pain of upper abdomen  FINDINGS:  · The terminal ileum and entire colon appeared normal. Performed random biopsy using biopsy forceps.  · Fair preparation.  Most of colonic walls were visualized and appeared normal however this exam was not                           adequate for colon cancer screening.     Appointment on 01/15/2025   Component Date Value    MEJIA SYNDROME DNA ERIKA* 01/15/2025 Not Detected     HEREDITARY BREAST & OVAR* 01/15/2025 Not Detected     FAMILIAL HYPERCHOLESTERO* 01/15/2025 Not Detected        Suicide/Homicide Risk Assessment:    Risk of Harm to Self:  Based on today's assessment, Jia presents the following risk of harm to self: low    Risk of Harm to " Others:  Based on today's assessment, Jia presents the following risk of harm to others: low    The following interventions are recommended: Continue medication management. No other intervention changes indicated at this time.    Psychotherapy Provided:     Individual psychotherapy provided: Yes    Medication changes discussed with Jia.  Medication education provided to Jia.  Discussed with Jia recent stressors including daughter's medical problems, the patient's medical problems, difficulty with daily responsibilities, functional impairment and impact on independence.  Importance of medication and treatment compliance reviewed with Jia.  Reassurance and supportive therapy provided.     Treatment Plan:    Completed and signed during the session: Not applicable - Treatment Plan not due at this session.    Goals: Progress towards Treatment Plan goals - Yes, progressing, as evidenced by subjective findings in HPI/Subjective Section and in Assessment and Plan Section    Depression Follow-up Plan Completed: Yes    Note Share:    This note was not shared with the patient due to reasonable likelihood of causing patient harm    Administrative Statements   Administrative Statements   Encounter provider Hi Hopkins MD    The Patient is located at Home and in the following state in which I hold an active license [place].    The patient was identified by name and date of birth. Jia Armstrong was informed that this is a telemedicine visit and that the visit is being conducted through the Epic Embedded platform. She agrees to proceed..  My office door was closed. No one else was in the room.  She acknowledged consent and understanding of privacy and security of the video platform. The patient has agreed to participate and understands they can discontinue the visit at any time.    I have spent a total time of 35 minutes in caring for this patient on the day of the visit/encounter including Risks and benefits of tx  "options, Instructions for management, Patient and family education, Importance of tx compliance, Risk factor reductions, Impressions, Counseling / Coordination of care, Documenting in the medical record, and Reviewing/placing orders in the medical record (including tests, medications, and/or procedures), not including the time spent for establishing the audio/video connection.    Visit Time  Visit Start Time: 1:05 PM  Visit Stop Time: 1:40 PM  Total Visit Duration: 35 minutes    Portions of the record may have been created with voice recognition software. Occasional wrong word or \"sound a like\" substitutions may have occurred due to the inherent limitations of voice recognition software. Read the chart carefully and recognize, using context, where substitutions have occurred.    Hi Hopkins MD 07/01/25  "

## 2025-07-09 ENCOUNTER — OFFICE VISIT (OUTPATIENT)
Dept: RHEUMATOLOGY | Facility: CLINIC | Age: 35
End: 2025-07-09
Payer: COMMERCIAL

## 2025-07-09 VITALS
TEMPERATURE: 97.8 F | HEART RATE: 79 BPM | OXYGEN SATURATION: 100 % | WEIGHT: 239 LBS | DIASTOLIC BLOOD PRESSURE: 80 MMHG | BODY MASS INDEX: 38.41 KG/M2 | SYSTOLIC BLOOD PRESSURE: 110 MMHG | HEIGHT: 66 IN

## 2025-07-09 DIAGNOSIS — M35.9 UNDIFFERENTIATED CONNECTIVE TISSUE DISEASE (HCC): Primary | ICD-10-CM

## 2025-07-09 DIAGNOSIS — L74.9 SWEATING ABNORMALITY: ICD-10-CM

## 2025-07-09 DIAGNOSIS — M79.7 FIBROMYALGIA: ICD-10-CM

## 2025-07-09 DIAGNOSIS — R63.4 WEIGHT LOSS, UNINTENTIONAL: ICD-10-CM

## 2025-07-09 PROCEDURE — 99205 OFFICE O/P NEW HI 60 MIN: CPT | Performed by: STUDENT IN AN ORGANIZED HEALTH CARE EDUCATION/TRAINING PROGRAM

## 2025-07-09 RX ORDER — PREDNISONE 5 MG/1
TABLET ORAL
Qty: 49 TABLET | Refills: 0 | Status: SHIPPED | OUTPATIENT
Start: 2025-07-09 | End: 2025-08-06

## 2025-07-09 NOTE — PATIENT INSTRUCTIONS
I'm not 100% sure that you do truly have lupus; other than the double stranded DNA antibody, your other tests haven't been consistent with lupus, and your symptoms and lack of response to prednisone all seem much more consistent with fibromyalgia. For now, we will not make changes to your lupus medications (hydroxychloroquine and Benlysta) while we gather more information. I want to make sure that if you do truly have lupus, we are treating it properly, but that if you don't, we are not putting you on unnecessary medications that are suppressing your immune system.    Some of your symptoms (your hand stiffness and swelling, specifically) do sound like they could be from a rheumatoid arthritis-like condition. Please call and reschedule your ultrasound for at least 3-4 days after stopping steroids, so that the steroids don't interfere with the results.    Please do the AVISE testing with [facility]. The other testing I ordered today (urine and blood) can be done at [facility].    I do think that fibromyalgia is at least part of what's going on. Fibromyalgia patients commonly experience severe fatigue, widespread pain, and unrestful sleep. Fortunately, this is not a rheumatologic condition and is not caused by your immune system attacking your body. Because of this, there are not rheumatologic medications or interventions that help with the condition, and you do not need to continue following with a rheumatologist for this condition.    There are several non-rheumatologic options for management of fibromyalgia that your PCP may discuss with you. These include physical therapy, cognitive behavioral therapy, and certain nerve pain medications such as pregabalin, gabapentin, duloxetine, milnacipran, and amitriptyline. Your PCP may even consider using low-dose naltrexone or referring you to a pain management specialist who is able to manage low-dose naltrexone for your chronic pain issues.

## 2025-07-09 NOTE — PROGRESS NOTES
Name: Jia Armstrong      : 1990      MRN: [id number]  Encounter Provider: Lv Crowder DO  Encounter Date: 2025   Encounter department: [facility]  :  Assessment & Plan  Undifferentiated connective tissue disease (HCC)  Had a long discussion about her SLE diagnosis.    Other than the positive dsDNA on multiple occasions, her blood work doesn't support the dx (complements always WNL, negative other specific SLE Abs, recent negative ADORE, no significant cytopenias).    Current symptoms do not sound SLE-related, sound much more fibromyalgia related. Exam supports this.    The fact that even 40 mg prednisone (for poison ivy) didn't help her pain symptoms at all essentially rules out an inflammatory cause of her pain. The fact that she's had no appreciable difference in her symptoms with hydroxychloroquine and belimumab also goes strongly against SLE as a diagnosis.    I did discuss that I'm not convinced based on symptoms, labs, and lack of response to treatment that she truly has SLE. The dsDNA Ab is fairly specific for SLE, but I wouldn't use that alone, in the absence of convincing connective tissue disease symptoms or lab findings, to commit someone to a diagnosis like SLE and the immunosuppression that is necessary for this.    Hand stiffness and swelling - possible rheumatoid arthritis-like condition (though would have expected this to essentially resolve with high-dose steroids). For completeness, will get ultrasound of hands.    Did discuss the risks of long term steroids, and that using it for the energy boost it gives her isn't an appropriate long-term strategy. While we do this other workup, I'll give her a few weeks of prednisone as she's going on a three-week trip and needs to be functional. We discussed the risks of long-term prednisone including but not limited to diabetes, hypertension, gastric ulcers, osteoporosis.     AVISE connective tissue disease  testing  Orders:    MISCELLANEOUS LAB TEST    predniSONE 5 mg tablet; Take 2 tablets (10 mg total) by mouth in the morning for 21 days, THEN 1 tablet (5 mg total) in the morning for 7 days.    US RA Hands/Wrists; Future    Urinalysis with microscopic    Protein / creatinine ratio, urine    Weight loss, unintentional  Sweating abnormality  Malignancy is very low on my differential diagnosis, but would potentially explain an elevated dsDNA Ab. Will check SPEP/UPEP for completeness.  Orders:    Protein electrophoresis, serum    Protein electrophoresis, urine    Fibromyalgia  Previous rheumatologist was prescribing her gabapentin for this. I discussed with patient that our practice doesn't manage fibromyalgia, as it is not a rheumatologic condition, and gabapentin isn't treating anything rheumatologic, so I would defer further refills/adjustments to her PCP. Would be reasonable for PCP to consider increasing dose if clinically feasible to see if it helps more with her symptoms, as it did help when it was initially added.    The nature of fibromyalgia was discussed at length including that it is not a rheumatologic condition and that rheumatologic/immunosuppressive medications do not help with this condition.    As fibromyalgia is not a rheumatologic condition and does not respond to rheumatologic medications, we do not manage this condition. PCP can consider medications such as duloxetine, pregabalin, gabapentin, milnacipran, and amitriptyline for fibromyalgia if clinically feasible and not contraindicated. Can also consider referral to a specialist for low-dose naltrexone for chronic pain. Can also consider physical therapy and cognitive behavioral therapy evaluation, and sleep medicine evaluation for fatigue if indicated. Opioids and sleep medications are not recommended for use for fibromyalgia.         I have spent a total time of 80 minutes in caring for this patient on the day of the visit/encounter including  Diagnostic results, Instructions for management, Patient and family education, Impressions, Documenting in the medical record, Reviewing/placing orders in the medical record (including tests, medications, and/or procedures), and Obtaining or reviewing history  .    History of Present Illness     Had been talking with her previous Rheum about doing infuseable Benlysta.    Primary symptoms - diffuse joint and bone pain, fatigue.    Has had issues with migraines, had occipital decompression surgery which only helped or a year. Had another occipital surgery about two years ago which helped but feels that she's starting to get them again. Worse with weather changes.    Lots of brain fog.    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    With heat feels very exhausted, not so much with sunlight.    Feels exhausted out of proportion to stimulus.    Has depression, anxiety, insomnia as comorbidities.    Sometimes feel hot flash-like sensations, doesn't check temp when that happens    Has been having unintentional weight loss, lost 50 lbs over the past 2 years. Was started on remeron for insomnia which increased appetite, but stopped recently and appetite went back down.    Lots of acne on face, chest, back. PCP told her she should go see a Derm again.    Occasional mouth ulcer, a few times a year. Frequency not changed by hydroxychloroquine or belimumab.    Hands swell sometimes in the mornings, cooling the hands helps. Does feel stiff in the hands in the mornings, takes an hour or two to loosen up. No other significant stiffness.    Fingertips get painful in the cold. Fingertips get lighter, not sharply demarcated.     Majority of fatigue/joint pain/brain fog started AFTER the second occipital surgery she had.    Does get sick often, has been tested for CVID and neg, but has mannose-binding lectin deficiency.    Is on gabapentin and cymblata.    Denies:  Fever  Rash  Nasal/genital ulcers  Muscle  "weakness  Inflammatory eye symptoms  Dactylitis  Dysphagia/odynophagia  CP  LARA  SOB at rest  Pleurisy  Hematochezia  Gross hematuria  Foamy urine  Raynaud's  Joint issues other than noted above      Permanent history: Pos ADORE and dsDNA Ab in 2022 checked due to daughter with CVID. Per external notes, at that time did not have enough symptoms to given SLE dx. Then saw another provider in the same practice a year later, was diagnosed with SLE based on persistently positive dsDNA (though repeat per this provider's note was negative). Had letty started on azathioprine and hydroxychloroquine. Dx was changed to SLE based on dsDNA, AM stiffness in MCPs and PIPs, and fatigue. Azathioprine was switched to belimumab with apparent improvement in joint pains and fatigue. However, despite this, at follow up with new provider in May 2025, pain and fatigue again had worsened. No assessment in the most recent two rheum notes, so unclear what patient's status was during those visits.    Fibromyalgia also mentioned in these outside notes, but nobody has formally told her that she has this.     Objective   /80   Pulse 79   Temp 97.8 °F (36.6 °C) (Tympanic)   Ht 5' 6\" (1.676 m)   Wt 108 kg (239 lb)   SpO2 100%   BMI 38.58 kg/m²     General: Well appearing, in no distress.   Eyes: Sclera non-icteric. EOMI  Extremities: Warm, well perfused   Neuro: Alert and oriented. No gross focal neurological deficits.   Skin: No rashes.  MSK exam: tenderness to palpation bilateral forearms, lateral thighs, anterior shins, R 3rd MCP and 4th DIP, bilateral traps, L-spine and paraspinals. No synovitis or synovial hypertrophy any joint appreciable    Persistently positive dsDNA Ab in the 40s-50s with a NEGATIVE ADORE in 2025, complements always normal    Neg Sm, RNP, B2GP, Cora-1, PR3, MPO Abs    No cytopenias going as far back as 2023  "

## 2025-07-21 ENCOUNTER — TELEPHONE (OUTPATIENT)
Dept: GASTROENTEROLOGY | Facility: CLINIC | Age: 35
End: 2025-07-21

## 2025-07-22 ENCOUNTER — TELEPHONE (OUTPATIENT)
Age: 35
End: 2025-07-22

## 2025-07-25 DIAGNOSIS — M54.2 NECK PAIN: Primary | ICD-10-CM

## 2025-07-30 ENCOUNTER — APPOINTMENT (OUTPATIENT)
Dept: LAB | Facility: CLINIC | Age: 35
End: 2025-07-30
Payer: COMMERCIAL

## 2025-07-30 DIAGNOSIS — M35.9 DIFFUSE DISEASE OF CONNECTIVE TISSUE (HCC): ICD-10-CM

## 2025-07-30 PROCEDURE — 36415 COLL VENOUS BLD VENIPUNCTURE: CPT

## 2025-07-31 ENCOUNTER — OFFICE VISIT (OUTPATIENT)
Dept: URGENT CARE | Facility: CLINIC | Age: 35
End: 2025-07-31
Payer: COMMERCIAL

## 2025-07-31 ENCOUNTER — APPOINTMENT (OUTPATIENT)
Dept: RADIOLOGY | Facility: CLINIC | Age: 35
End: 2025-07-31
Attending: PHYSICIAN ASSISTANT
Payer: COMMERCIAL

## 2025-07-31 VITALS
TEMPERATURE: 96.9 F | DIASTOLIC BLOOD PRESSURE: 72 MMHG | RESPIRATION RATE: 18 BRPM | OXYGEN SATURATION: 96 % | HEART RATE: 76 BPM | SYSTOLIC BLOOD PRESSURE: 123 MMHG

## 2025-07-31 DIAGNOSIS — S39.012A LUMBAR STRAIN, INITIAL ENCOUNTER: Primary | ICD-10-CM

## 2025-07-31 DIAGNOSIS — S39.012A LUMBAR STRAIN, INITIAL ENCOUNTER: ICD-10-CM

## 2025-07-31 PROCEDURE — 72100 X-RAY EXAM L-S SPINE 2/3 VWS: CPT

## 2025-07-31 PROCEDURE — G0382 LEV 3 HOSP TYPE B ED VISIT: HCPCS | Performed by: PHYSICIAN ASSISTANT

## 2025-07-31 RX ORDER — PREDNISONE 10 MG/1
3 TABLET ORAL
COMMUNITY

## 2025-07-31 RX ORDER — KETOROLAC TROMETHAMINE 30 MG/ML
INJECTION, SOLUTION INTRAMUSCULAR; INTRAVENOUS
COMMUNITY
Start: 2025-05-17

## 2025-07-31 RX ORDER — IBUPROFEN 800 MG/1
TABLET, FILM COATED ORAL EVERY 6 HOURS PRN
COMMUNITY

## 2025-07-31 RX ORDER — METHOCARBAMOL 500 MG/1
500 TABLET, FILM COATED ORAL 3 TIMES DAILY
Qty: 18 TABLET | Refills: 0 | Status: SHIPPED | OUTPATIENT
Start: 2025-07-31 | End: 2025-08-06

## 2025-08-07 DIAGNOSIS — F33.2 MAJOR DEPRESSIVE DISORDER, RECURRENT SEVERE WITHOUT PSYCHOTIC FEATURES (HCC): ICD-10-CM

## 2025-08-07 RX ORDER — BUPROPION HYDROCHLORIDE 300 MG/1
TABLET ORAL
Qty: 90 TABLET | Refills: 0 | Status: SHIPPED | OUTPATIENT
Start: 2025-08-07

## 2025-08-07 RX ORDER — BUPROPION HYDROCHLORIDE 150 MG/1
TABLET ORAL
Qty: 90 TABLET | Refills: 0 | Status: SHIPPED | OUTPATIENT
Start: 2025-08-07

## 2025-08-08 ENCOUNTER — RESULTS FOLLOW-UP (OUTPATIENT)
Dept: RHEUMATOLOGY | Facility: CLINIC | Age: 35
End: 2025-08-08

## 2025-08-18 ENCOUNTER — TELEPHONE (OUTPATIENT)
Dept: PSYCHIATRY | Facility: CLINIC | Age: 35
End: 2025-08-18

## 2025-08-19 ENCOUNTER — HOSPITAL ENCOUNTER (OUTPATIENT)
Dept: RADIOLOGY | Facility: HOSPITAL | Age: 35
Discharge: HOME | End: 2025-08-19
Attending: STUDENT IN AN ORGANIZED HEALTH CARE EDUCATION/TRAINING PROGRAM
Payer: COMMERCIAL

## 2025-08-19 DIAGNOSIS — M35.9 UNDIFFERENTIATED CONNECTIVE TISSUE DISEASE (HCC): ICD-10-CM

## 2025-08-19 PROCEDURE — 76882 US LMTD JT/FCL EVL NVASC XTR: CPT
